# Patient Record
Sex: MALE
[De-identification: names, ages, dates, MRNs, and addresses within clinical notes are randomized per-mention and may not be internally consistent; named-entity substitution may affect disease eponyms.]

---

## 2018-04-28 ENCOUNTER — HOSPITAL ENCOUNTER (INPATIENT)
Dept: HOSPITAL 42 - ED | Age: 83
LOS: 12 days | Discharge: NURSING, REHAB OR LONG-TERM CARE | DRG: 83 | End: 2018-05-10
Attending: INTERNAL MEDICINE | Admitting: INTERNAL MEDICINE
Payer: MEDICARE

## 2018-04-28 VITALS — BODY MASS INDEX: 26.4 KG/M2

## 2018-04-28 DIAGNOSIS — E78.00: ICD-10-CM

## 2018-04-28 DIAGNOSIS — E86.0: ICD-10-CM

## 2018-04-28 DIAGNOSIS — Z78.1: ICD-10-CM

## 2018-04-28 DIAGNOSIS — K92.2: ICD-10-CM

## 2018-04-28 DIAGNOSIS — Z91.14: ICD-10-CM

## 2018-04-28 DIAGNOSIS — E03.9: ICD-10-CM

## 2018-04-28 DIAGNOSIS — Z79.82: ICD-10-CM

## 2018-04-28 DIAGNOSIS — I49.3: ICD-10-CM

## 2018-04-28 DIAGNOSIS — Z95.5: ICD-10-CM

## 2018-04-28 DIAGNOSIS — R40.2411: ICD-10-CM

## 2018-04-28 DIAGNOSIS — R13.12: ICD-10-CM

## 2018-04-28 DIAGNOSIS — Z87.891: ICD-10-CM

## 2018-04-28 DIAGNOSIS — B37.49: ICD-10-CM

## 2018-04-28 DIAGNOSIS — I10: ICD-10-CM

## 2018-04-28 DIAGNOSIS — Z79.899: ICD-10-CM

## 2018-04-28 DIAGNOSIS — F12.11: ICD-10-CM

## 2018-04-28 DIAGNOSIS — E87.2: ICD-10-CM

## 2018-04-28 DIAGNOSIS — E78.5: ICD-10-CM

## 2018-04-28 DIAGNOSIS — D69.6: ICD-10-CM

## 2018-04-28 DIAGNOSIS — E87.1: ICD-10-CM

## 2018-04-28 DIAGNOSIS — Z51.5: ICD-10-CM

## 2018-04-28 DIAGNOSIS — F05: ICD-10-CM

## 2018-04-28 DIAGNOSIS — E87.0: ICD-10-CM

## 2018-04-28 DIAGNOSIS — H26.9: ICD-10-CM

## 2018-04-28 DIAGNOSIS — I25.10: ICD-10-CM

## 2018-04-28 DIAGNOSIS — E87.5: ICD-10-CM

## 2018-04-28 DIAGNOSIS — H91.90: ICD-10-CM

## 2018-04-28 DIAGNOSIS — J44.1: ICD-10-CM

## 2018-04-28 DIAGNOSIS — F03.90: ICD-10-CM

## 2018-04-28 DIAGNOSIS — D18.1: ICD-10-CM

## 2018-04-28 DIAGNOSIS — Z91.81: ICD-10-CM

## 2018-04-28 DIAGNOSIS — N17.9: ICD-10-CM

## 2018-04-28 DIAGNOSIS — R65.10: ICD-10-CM

## 2018-04-28 DIAGNOSIS — H54.61: ICD-10-CM

## 2018-04-28 DIAGNOSIS — S06.5X9A: Primary | ICD-10-CM

## 2018-04-28 DIAGNOSIS — D64.9: ICD-10-CM

## 2018-04-28 LAB
ALBUMIN SERPL-MCNC: 4.6 G/DL (ref 3–4.8)
ALBUMIN SERPL-MCNC: 4.7 G/DL (ref 3–4.8)
ALBUMIN/GLOB SERPL: 1.5 {RATIO} (ref 1.1–1.8)
ALBUMIN/GLOB SERPL: 1.6 {RATIO} (ref 1.1–1.8)
ALT SERPL-CCNC: 32 U/L (ref 7–56)
ALT SERPL-CCNC: 36 U/L (ref 7–56)
APTT BLD: 32.6 SECONDS (ref 25.1–36.5)
ARTERIAL BLOOD GAS O2 SAT: 99.5 % (ref 95–98)
ARTERIAL BLOOD GAS PCO2: 27 MM/HG (ref 35–45)
ARTERIAL BLOOD GAS TCO2: 20.5 MMOL.L (ref 22–28)
AST SERPL-CCNC: 26 U/L (ref 17–59)
AST SERPL-CCNC: 29 U/L (ref 17–59)
BASE EXCESS BLDV CALC-SCNC: -0.3 MMOL/L (ref 0–2)
BASE EXCESS BLDV CALC-SCNC: 1.9 MMOL/L (ref 0–2)
BASOPHILS # BLD AUTO: 0 K/MM3 (ref 0–2)
BASOPHILS # BLD AUTO: 0.02 K/MM3 (ref 0–2)
BASOPHILS NFR BLD: 0 % (ref 0–3)
BASOPHILS NFR BLD: 0.2 % (ref 0–3)
BNP SERPL-MCNC: 331 PG/ML (ref 0–450)
BUN SERPL-MCNC: 12 MG/DL (ref 7–21)
BUN SERPL-MCNC: 13 MG/DL (ref 7–21)
CALCIUM SERPL-MCNC: 9.7 MG/DL (ref 8.4–10.5)
CALCIUM SERPL-MCNC: 9.8 MG/DL (ref 8.4–10.5)
EOSINOPHIL # BLD: 0 10*3/UL (ref 0–0.7)
EOSINOPHIL # BLD: 0.4 10*3/UL (ref 0–0.7)
EOSINOPHIL NFR BLD: 0 % (ref 1.5–5)
EOSINOPHIL NFR BLD: 2.8 % (ref 1.5–5)
ERYTHROCYTE [DISTWIDTH] IN BLOOD BY AUTOMATED COUNT: 13.6 % (ref 11.5–14.5)
ERYTHROCYTE [DISTWIDTH] IN BLOOD BY AUTOMATED COUNT: 13.9 % (ref 11.5–14.5)
GFR NON-AFRICAN AMERICAN: > 60
GFR NON-AFRICAN AMERICAN: > 60
GRANULOCYTES # BLD: 10.34 10*3/UL (ref 1.4–6.5)
GRANULOCYTES # BLD: 11.37 10*3/UL (ref 1.4–6.5)
GRANULOCYTES NFR BLD: 78.9 % (ref 50–68)
GRANULOCYTES NFR BLD: 95.2 % (ref 50–68)
HCO3 BLDA-SCNC: 19.7 MMOL/L (ref 21–28)
HGB BLD-MCNC: 15 G/DL (ref 14–18)
HGB BLD-MCNC: 15.3 G/DL (ref 14–18)
INHALED O2 CONCENTRATION: 40 %
INR PPP: 1.15 (ref 0.93–1.08)
LYMPHOCYTE: 5 % (ref 22–35)
LYMPHOCYTES # BLD: 0.4 10*3/UL (ref 1.2–3.4)
LYMPHOCYTES # BLD: 1.3 10*3/UL (ref 1.2–3.4)
LYMPHOCYTES NFR BLD AUTO: 10.2 % (ref 22–35)
LYMPHOCYTES NFR BLD AUTO: 3.7 % (ref 22–35)
MCH RBC QN AUTO: 31.7 PG (ref 25–35)
MCH RBC QN AUTO: 31.8 PG (ref 25–35)
MCHC RBC AUTO-ENTMCNC: 34.9 G/DL (ref 31–37)
MCHC RBC AUTO-ENTMCNC: 34.9 G/DL (ref 31–37)
MCV RBC AUTO: 91.1 FL (ref 80–105)
MCV RBC AUTO: 91.1 FL (ref 80–105)
MONOCYTE: 1 % (ref 1–6)
MONOCYTES # BLD AUTO: 0.1 10*3/UL (ref 0.1–0.6)
MONOCYTES # BLD AUTO: 1 10*3/UL (ref 0.1–0.6)
MONOCYTES NFR BLD: 1.1 % (ref 1–6)
MONOCYTES NFR BLD: 7.9 % (ref 1–6)
NEUTROPHILS NFR BLD AUTO: 94 % (ref 50–70)
PH BLDA: 7.47 [PH] (ref 7.35–7.45)
PH BLDV: 7.33 [PH] (ref 7.32–7.43)
PH BLDV: 7.46 [PH] (ref 7.32–7.43)
PLATELET # BLD EST: NORMAL 10*3/UL
PLATELET # BLD: 219 10^3/UL (ref 120–450)
PLATELET # BLD: 240 10^3/UL (ref 120–450)
PMV BLD AUTO: 10 FL (ref 7–11)
PMV BLD AUTO: 9.9 FL (ref 7–11)
PO2 BLDA: 111 MM/HG (ref 80–100)
PROTHROMBIN TIME: 13.3 SECONDS (ref 9.4–12.5)
RBC # BLD AUTO: 4.72 10^6/UL (ref 3.5–6.1)
RBC # BLD AUTO: 4.82 10^6/UL (ref 3.5–6.1)
TROPONIN I SERPL-MCNC: < 0.01 NG/ML
VENOUS BLOOD FIO2: 21 %
VENOUS BLOOD FIO2: 21 %
VENOUS BLOOD GAS PCO2: 32 (ref 40–60)
VENOUS BLOOD GAS PCO2: 55 (ref 40–60)
VENOUS BLOOD GAS PO2: 141 MM/HG (ref 30–55)
VENOUS BLOOD GAS PO2: 35 MM/HG (ref 30–55)
WBC # BLD AUTO: 11.9 10^3/UL (ref 4.5–11)
WBC # BLD AUTO: 13.1 10^3/UL (ref 4.5–11)

## 2018-04-28 RX ADMIN — IPRATROPIUM BROMIDE AND ALBUTEROL SULFATE SCH ML: .5; 3 SOLUTION RESPIRATORY (INHALATION) at 11:50

## 2018-04-28 RX ADMIN — CEFTRIAXONE SCH MLS/HR: 1 INJECTION, SOLUTION INTRAVENOUS at 15:38

## 2018-04-28 RX ADMIN — AZITHROMYCIN DIHYDRATE SCH MLS/HR: 500 INJECTION, POWDER, LYOPHILIZED, FOR SOLUTION INTRAVENOUS at 17:00

## 2018-04-28 RX ADMIN — IPRATROPIUM BROMIDE AND ALBUTEROL SULFATE SCH ML: .5; 3 SOLUTION RESPIRATORY (INHALATION) at 12:20

## 2018-04-28 RX ADMIN — METHYLPREDNISOLONE SODIUM SUCCINATE SCH: 40 INJECTION, POWDER, FOR SOLUTION INTRAMUSCULAR; INTRAVENOUS at 15:32

## 2018-04-28 RX ADMIN — IPRATROPIUM BROMIDE AND ALBUTEROL SULFATE SCH ML: .5; 3 SOLUTION RESPIRATORY (INHALATION) at 15:37

## 2018-04-28 RX ADMIN — IPRATROPIUM BROMIDE AND ALBUTEROL SULFATE SCH ML: .5; 3 SOLUTION RESPIRATORY (INHALATION) at 21:22

## 2018-04-28 RX ADMIN — BUDESONIDE SCH MG: 0.25 SUSPENSION RESPIRATORY (INHALATION) at 21:22

## 2018-04-28 RX ADMIN — IPRATROPIUM BROMIDE AND ALBUTEROL SULFATE SCH ML: .5; 3 SOLUTION RESPIRATORY (INHALATION) at 12:05

## 2018-04-28 RX ADMIN — METHYLPREDNISOLONE SODIUM SUCCINATE SCH MG: 40 INJECTION, POWDER, FOR SOLUTION INTRAMUSCULAR; INTRAVENOUS at 22:56

## 2018-04-28 NOTE — CP.PCM.PN
Subjective





- Date & Time of Evaluation


Date of Evaluation: 04/28/18


Time of Evaluation: 22:15





- Subjective


Subjective: 





Patient was seen at bedside .


Reason: I was asked to Co-sign restraint order.


Medical record was reviewed.


Patient is agitated, restless , in the bed.


Tells me to get out of room.


Does not answer any other questions.


This 82 year old white male was admitted with shortness, wheezing,  headache, 

leukocytosis,bilateral subdural hematoma.


Has PMH of COPD, hypothyroidism, alcohol abuse , former smoker.





Objective





- Vital Signs/Intake and Output


Vital Signs (last 24 hours): 


 











Temp Pulse Resp BP Pulse Ox


 


 98.3 F   100 H  14   154/103 H  99 


 


 04/28/18 20:00  04/28/18 20:50  04/28/18 20:50  04/28/18 20:00  04/28/18 20:50








Intake and Output: 


 











 04/28/18 04/29/18





 18:59 06:59


 


Intake Total 350 


 


Output Total 160 


 


Balance 190 














- Medications


Medications: 


 Current Medications





Albuterol/Ipratropium (Duoneb 3 Mg/0.5 Mg (3 Ml) Ud)  3 ml IH Q4H [facility]


   Stop: 04/28/18 23:31


   Last Admin: 04/28/18 21:22 Dose:  3 ml


Aspirin (Ecotrin)  81 mg PO DAILY [facility]


Atorvastatin Calcium (Lipitor)  20 mg PO DAILY [facility]


Budesonide (Pulmicort Respules)  0.25 mg IH [id number] [facility]


   Last Admin: 04/28/18 21:22 Dose:  0.25 mg


Ceftriaxone Sodium (Rocephin 1 Gram Ivpb)  1 gm in 100 mls @ 100 mls/hr IVPB 

DAILY [facility]


   PRN Reason: Protocol


   Last Admin: 04/28/18 15:38 Dose:  100 mls/hr


Azithromycin (Zithromax 500mg In Ns)  500 mg in 250 mls @ 167 mls/hr IVPB DAILY 

[facility]


   PRN Reason: Protocol


   Last Admin: 04/28/18 17:00 Dose:  167 mls/hr


Lisinopril (Zestril)  20 mg PO DAILY [facility]


Lorazepam (Ativan)  2 mg IVP ONCE ONE


   PRN Reason: Protocol


   Stop: 04/28/18 22:14


Methylprednisolone (Solu-Medrol)  40 mg IVP Q8H [facility]


   Last Admin: 04/28/18 15:32 Dose:  Not Given


Metoprolol Tartrate (Lopressor)  25 mg PO BID [facility]


   Last Admin: 04/28/18 17:00 Dose:  25 mg


Pantoprazole Sodium (Protonix Inj)  40 mg IVP DAILY [facility]


   Last Admin: 04/28/18 17:00 Dose:  40 mg











- Labs


Labs: 


 











PT  13.3 SECONDS (9.4-12.5)  H  04/28/18  11:39    


 


INR  1.15  (0.93-1.08)  H  04/28/18  11:39    


 


APTT  32.6 Seconds (25.1-36.5)   04/28/18  11:39    








 Most Recent Lab Values











WBC  13.1 10^3/ul (4.5-11.0)  H  04/28/18  11:39    


 


RBC  4.82 10^6/uL (3.5-6.1)   04/28/18  11:39    


 


Hgb  15.3 g/dL (14.0-18.0)   04/28/18  11:39    


 


Hct  43.9 % (42.0-52.0)   04/28/18  11:39    


 


MCV  91.1 fl (80.0-105.0)   04/28/18  11:39    


 


MCH  31.7 pg (25.0-35.0)   04/28/18  11:39    


 


MCHC  34.9 g/dl (31.0-37.0)   04/28/18  11:39    


 


RDW  13.6 % (11.5-14.5)   04/28/18  11:39    


 


Plt Count  219 10^3/uL (120.0-450.0)   04/28/18  11:39    


 


MPV  10.0 fl (7.0-11.0)   04/28/18  11:39    


 


Gran %  78.9 % (50.0-68.0)  H  04/28/18  11:39    


 


Lymph % (Auto)  10.2 % (22.0-35.0)  L  04/28/18  11:39    


 


Mono % (Auto)  7.9 % (1.0-6.0)  H  04/28/18  11:39    


 


Eos % (Auto)  2.8 % (1.5-5.0)   04/28/18  11:39    


 


Baso % (Auto)  0.2 % (0.0-3.0)   04/28/18  11:39    


 


Gran #  10.34  (1.4-6.5)  H  04/28/18  11:39    


 


Lymph # (Auto)  1.3  (1.2-3.4)   04/28/18  11:39    


 


Mono # (Auto)  1.0  (0.1-0.6)  H  04/28/18  11:39    


 


Eos # (Auto)  0.4  (0.0-0.7)   04/28/18  11:39    


 


Baso # (Auto)  0.02 K/mm3 (0.0-2.0)   04/28/18  11:39    


 


PT  13.3 SECONDS (9.4-12.5)  H  04/28/18  11:39    


 


INR  1.15  (0.93-1.08)  H  04/28/18  11:39    


 


APTT  32.6 Seconds (25.1-36.5)   04/28/18  11:39    


 


pCO2  27 mm/Hg (35-45)  L  04/28/18  15:40    


 


pO2  111.0 mm/Hg ()  H  04/28/18  15:40    


 


HCO3  19.7 mmol/L (21-28)  L  04/28/18  15:40    


 


ABG pH  7.47  (7.35-7.45)  H  04/28/18  15:40    


 


ABG Total CO2  20.5 mmol.L (22-28)  L  04/28/18  15:40    


 


ABG O2 Saturation  99.5 % (95-98)  H  04/28/18  15:40    


 


ABG Base Excess  -2.6 mmol/L (-2.0-3.0)  L  04/28/18  15:40    


 


ABG Potassium  3.0 mmol/L (3.6-5.2)  L  04/28/18  15:40    


 


VBG pH  7.46  (7.32-7.43)  H  04/28/18  15:35    


 


VBG pCO2  32.0  (40-60)  L  04/28/18  15:35    


 


VBG HCO3  22.8 mmol/l (21-28)   04/28/18  15:35    


 


VBG Total CO2  23.8 mmol.L (22-28)   04/28/18  15:35    


 


VBG O2 Sat (Calc)  100.3 % (40-65)  H  04/28/18  15:35    


 


VBG Base Excess  -0.3 mmol/L (0.0-2.0)  L  04/28/18  15:35    


 


VBG Potassium  3.8 mmol/L (3.6-5.2)   04/28/18  15:35    


 


Sodium  141.0 mmol/L (132-148)   04/28/18  15:40    


 


Chloride  112.0 mmol/L ()  H  04/28/18  15:40    


 


Glucose  169 mg/dl ()  H  04/28/18  15:40    


 


Lactate  1.5 mmol/L (0.7-2.1)   04/28/18  15:40    


 


FiO2  40.0 %  04/28/18  15:40    


 


Sodium  144 mmol/L (132-148)   04/28/18  11:39    


 


Potassium  4.2 mmol/L (3.6-5.0)   04/28/18  11:39    


 


Chloride  102 mmol/L ()   04/28/18  11:39    


 


Carbon Dioxide  29 mmol/L (21-33)   04/28/18  11:39    


 


Anion Gap  17  (10-20)   04/28/18  11:39    


 


BUN  12 mg/dL (7-21)   04/28/18  11:39    


 


Creatinine  0.9 mg/dl (0.8-1.5)   04/28/18  11:39    


 


Est GFR ( Amer)  > 60   04/28/18  11:39    


 


Est GFR (Non-Af Amer)  > 60   04/28/18  11:39    


 


Random Glucose  129 mg/dL ()  H  04/28/18  11:39    


 


Calcium  9.7 mg/dL (8.4-10.5)   04/28/18  11:39    


 


Total Bilirubin  0.6 mg/dL (0.2-1.3)   04/28/18  11:39    


 


AST  29 U/L (17-59)   04/28/18  11:39    


 


ALT  36 U/L (7-56)   04/28/18  11:39    


 


Alkaline Phosphatase  89 U/L ()   04/28/18  11:39    


 


Lactate Dehydrogenase  432 U/L (333-699)   04/28/18  11:39    


 


Total Creatine Kinase  37 U/L ()   04/28/18  11:39    


 


Troponin I  < 0.01 ng/mL  04/28/18  11:39    


 


NT-Pro-B Natriuret Pep  331 pg/mL (0-450)   04/28/18  11:39    


 


Total Protein  7.7 g/dL (5.8-8.3)   04/28/18  11:39    


 


Albumin  4.7 g/dL (3.0-4.8)   04/28/18  11:39    


 


Globulin  3.0 gm/dL  04/28/18  11:39    


 


Albumin/Globulin Ratio  1.5  (1.1-1.8)   04/28/18  11:39    


 


Arterial Blood Potassium  3.0 mmol/L (3.6-5.2)  L  04/28/18  15:40    


 


Venous Blood Potassium  3.8 mmol/L (3.6-5.2)   04/28/18  15:35    














- Constitutional


Appears: Well, No Acute Distress, Other (Restless.)





- Head Exam


Head Exam: ATRAUMATIC, NORMAL INSPECTION, NORMOCEPHALIC





- Eye Exam


Additional comments: 





Right eye enucleated.





- ENT Exam


ENT Exam: Normal External Ear Exam


Additional comments: 





Deafness.





- Neck Exam


Neck Exam: Normal Inspection





- Respiratory Exam


Respiratory Exam: NORMAL BREATHING PATTERN





- Cardiovascular Exam


Cardiovascular Exam: absent: JVD





- GI/Abdominal Exam


GI & Abdominal Exam: absent: Distended





- Rectal Exam


Rectal Exam: Deferred





-  Exam


Additional comments: 





Deferred.





- Extremities Exam


Extremities Exam: Normal Inspection





- Back Exam


Back Exam: NORMAL INSPECTION





- Neurological Exam


Neurological Exam: Altered





- Psychiatric Exam


Psychiatric exam: Agitated





- Skin


Skin Exam: Normal Color





Assessment and Plan





- Assessment and Plan (Free Text)


Assessment: 





Agitation.


Restlessness.


Subdural hematoma.


R/O hypercarbia.


COPD.


HTN.


Hypothyroidism.


Plan: 





CT head without contrast stat.


Ativan 2 mg IV  when in CT dept prn.


ABG stat.


FSBS stat.


CBC with Diff, CMP, Magnesium , Phos. levels.


Serum alcohol level.


Restraint order signed.


Continue present management.


CCT spent 30 minutes.


CT head: No extension of hematoma.

## 2018-04-28 NOTE — RAD
HISTORY:

shortness of breathe  



COMPARISON:

07/16/2015 



FINDINGS:



LUNGS:

No active pulmonary disease.



PLEURA:

No significant pleural effusion identified, no pneumothorax apparent. 

Mild interstitial change is noted.



CARDIOVASCULAR:

There is uncoiling of the aorta with atherosclerotic change. Heart is 

grossly unchanged from prior study.  No CHF is seen.



OSSEOUS STRUCTURES:

No significant abnormalities.



VISUALIZED UPPER ABDOMEN:

Normal.



OTHER FINDINGS:

None.



IMPRESSION:

No active disease.

## 2018-04-28 NOTE — CT
PROCEDURE:  CT HEAD WITHOUT CONTRAST.



HISTORY:

headache r/o ich



COMPARISON:

CT scan 02/27/2014, MRI 2015 



TECHNIQUE:

Axial computed tomography images were obtained through the head/brain 

without intravenous contrast.  



Radiation dose:



Total exam DLP = 970 mGy-cm.



This CT exam was performed using one or more of the following dose 

reduction techniques: Automated exposure control, adjustment of the 

mA and/or kV according to patient size, and/or use of iterative 

reconstruction technique.



FINDINGS:



HEMORRHAGE:

There appears to be the suggestion of some mild linear areas of high 

density in the subdural regions overlying the right frontoparietal 

lobe and additionally right posterior subdural parietal lobe region. 

Additional areas of my old bilateral lower density subdural 

collections are identified bilaterally, slightly greater on the left 

with very mild localized mass effect. These are new from the prior CT 

scan dated 02/27/2014 and MRI exam dated 01/26/2015. On the left this 

measures 5 is 7 millimeters. On the right the collection measures 4-5 

millimeters posteriorly and superiorly along the convex to the of the 

posterior right parietal lobe. 



BRAIN:

Cerebral cortical atrophy is noted.  Ventricles are normal in size. 

Moderate areas of decreased density are seen in the white matter 

tracts suggestive of microvascular small-vessel change.  No 

appreciable acute parenchymal hemorrhage is noted.  No 

intraventricular hemorrhage is seen.  Posterior fossa is stable.  No 

atrophy or chronic microvascular ischemic changes.



VENTRICLES:

Unremarkable. No hydrocephalus. 



CALVARIUM:

Unremarkable.



PARANASAL SINUSES:

There is evidence of prior inferior wall chronic fracture.  Right 

globe is also atrophied as was seen on prior study.  No acute 

fractures are seen in the sinuses.



MASTOID AIR CELLS:

Unremarkable as visualized. No inflammatory changes.



OTHER FINDINGS:

None.



IMPRESSION:

Small bilateral subdural collections are noted.  There appears to be 

some mild intermediate to higher density in the right subdural 

collection which is smaller in size.  Mild localized mass effect is 

noted. Please see above for measurements of the subdural collections. 

 Findings suggest subacute to chronic subdural hematomas. No 

intraparenchymal hemorrhage seen. No recent infarct clearly 

identified.  Stable atrophy and small vessel change.

## 2018-04-28 NOTE — ED PDOC
Arrival/HPI





- General


Chief Complaint: Headache


Time Seen by Provider: 04/28/18 11:01


Historian: Spouse





- Critical Care


Critical Care Minutes: 30 minutes





- History of Present Illness


Narrative History of Present Illness (Text): 


04/28/18 11:28





An 82 year old male, whose past medical history includes deafness, HTN, chronic 

obstructive pulmonary disease, and hypothyroidism, presents to the emergency 

room with a complaint of shortness of breath, wheezing, and headache this 

morning. The patient's wife states that this morning the patient took his 

nebulizer with no relief of his symptoms. She also states that the patient 

complained of a headache. She states she gave the patient Tylenol, but his 

symptoms were not relieved which prompted them to come into the emergency room. 

She states that he seemed confused this morning. The patient denies fevers, 

chills, dizziness, sore throat, chest pain, abdominal pain, nausea, vomiting, 

diarrhea, neck/back pain, urinary/bowel changes or any other complaint. 








PMD: Dr. Mcintosh








Time/Duration: Other (This morning)


Symptom Onset: Sudden


Symptom Course: Unchanged


Activities at Onset: Rest, Light


Context: Home





Past Medical History





- Provider Review


Nursing Documentation Reviewed: Yes





- Infectious Disease


Hx of Infectious Diseases: None





- Tetanus Immunization


Tetanus Immunization: Up to Date





- Cardiac


Hx Hypertension: Yes


Hx Pacemaker: No





- Pulmonary


Hx Chronic Obstructive Pulmonary Disease (COPD): Yes





- Neurological


Hx Paralysis: No





- Endocrine/Metabolic


Hx Hypothyroidism: Yes





- Hematological/Oncological


Hx Blood Transfusions: No


Hx Blood Transfusion Reaction: No





- Musculoskeletal/Rheumatological


Hx Musculoskeletal Disorders: No





- Psychiatric


Hx Depression: No


Hx Emotional Abuse: No


Hx Physical Abuse: No


Hx Substance Use: No





- Past Surgical History


Past Surgical History: Non-Contributing





- Anesthesia


Hx Anesthesia: Yes


Hx Anesthesia Reactions: No


Hx Malignant Hyperthermia: No





- Suicidal Assessment


Feels Threatened In Home Enviroment: No





Family/Social History





- Physician Review


Nursing Documentation Reviewed: Yes


Family/Social History: No Known Family HX


Smoking Status: Unknown If Ever Smoked


Hx Alcohol Use: Yes


Hx Substance Use: No


Hx Substance Use Treatment: No





Allergies/Home Meds


Allergies/Adverse Reactions: 


Allergies





No Known Allergies Allergy (Verified 04/28/18 15:33)


 








Home Medications: 


 Home Meds











 Medication  Instructions  Recorded  Confirmed


 


Budesonide 0.5 mg IH DAILY 08/05/13 07/14/14


 


Indomethacin 50 mg PO PRN PRN 08/05/13 07/14/14


 


Levothyroxine Sodium 0.125 mg PO DAILY 08/05/13 07/14/14





[Levothyroxine]   


 


Lisinopril 20 mg PO DAILY 08/05/13 07/14/14


 


Metoprolol Tartrate 25 mg PO BID 08/05/13 07/14/14


 


Simvastatin 80 mg PO DAILY 08/05/13 07/14/14


 


Terazosin HCl [Terazosin HCl] 2 mg PO DAILY 08/05/13 07/14/14


 


Tiotropium Bromide [Spiriva] 18 mcg IH DAILY 08/05/13 07/14/14


 


Acetaminophen/Oxycodone Hydr 1 tab PO Q6 PRN 08/12/13 07/14/14





[Percocet 325 mg-5 mg]   


 


Aspirin 81 mg PO DAILY 07/14/14 07/14/14














Review of Systems





- Physician Review


All systems were reviewed & negative as marked: Yes





- Review of Systems


Constitutional: absent: Fevers, Night Sweats


ENT: absent: Sore Throat


Respiratory: SOB, Wheezing


Cardiovascular: absent: Chest Pain


Gastrointestinal: absent: Abdominal Pain, Stool Changes, Diarrhea, Nausea, 

Vomiting


Genitourinary Male: absent: Urinary Output Changes


Musculoskeletal: absent: Back Pain, Neck Pain


Neurological: Headache.  absent: Dizziness





Physical Exam


Vital Signs Reviewed: Yes


Vital Signs











  Temp Pulse Resp BP Pulse Ox


 


 04/28/18 14:42   119 H  19  142/84  95


 


 04/28/18 13:27   112 H  18  158/96 H  100


 


 04/28/18 11:06  98.4 F  100 H  18  162/116 H  97











Temperature: Afebrile


Blood Pressure: Hypertensive


Pulse: Tachycardic


Respiratory Rate: Normal


Appearance: Positive for: Well-Appearing, Non-Toxic, Comfortable


Pain Distress: None


Mental Status: Positive for: Alert and Oriented X 3





- Systems Exam


Head: Present: Atraumatic, Normocephalic


Pupils: Present: PERRL, Other (Atrophy of the right eye. )


Extroacular Muscles: Present: EOMI


Conjunctiva: Present: Normal


Mouth: Present: Moist Mucous Membranes


Neck: Present: Normal Range of Motion


Respiratory/Chest: Present: Wheezes (Bilateral wheezing).  No: Good Air 

Exchange (Decreased air entry. ), Rales, Rhonchi, Tachypneic


Cardiovascular: Present: Regular Rate and Rhythm, Normal S1, S2.  No: Murmurs


Abdomen: No: Tenderness, Distention, Peritoneal Signs


Back: Present: Normal Inspection


Upper Extremity: Present: Normal Inspection.  No: Cyanosis, Edema


Lower Extremity: Present: Normal Inspection.  No: Edema


Neurological: Present: GCS=15, CN II-XII Intact, Speech Normal


Skin: Present: Warm, Dry, Normal Color.  No: Rashes


Psychiatric: Present: Alert, Oriented x 3, Normal Insight, Normal Concentration





Medical Decision Making


ED Course and Treatment: 


04/28/18 11:44





Impression:


An 82 year old male presents to the emergency room with complaint of shortness 

of breath, wheezing, and headache since this morning. 





Differential Diagnosis included but are not limited to: chronic obstructive 

pulmonary disease exacerbation, tension vs. migraines less likely CVA. 





Plan:


-- Head CT


-- EKG


-- Chest X-ray 


-- Labs


-- Blood Culture


-- Duoneb and SOLU-Medrol


-- Reassess and disposition





Progress Notes:





EKG:


Ordered, reviewed, and independently interpreted the EKG.


Rate : 100 BPM


Rhythm : NSR


Interpretation : Non-specific ST changes


Comparison : No change from 06/17/14





CHEST X-RAY


Dictator : Alycia Lin MD


Report Date : 04/28/2018 11:44:35


IMPRESSION: No active disease.








PROCEDURE:  CT HEAD WITHOUT CONTRAST.


Dictator : Alycia Lin MD


Report Date : 04/28/2018 13:48:56


IMPRESSION: Small bilateral subdural collections are noted.  There appears to 

be some mild intermediate to higher density in the right subdural collection 

which is smaller in size.  Mild localized mass effect is noted. Please see 

above for measurements of the subdural collections.  Findings suggest subacute 

to chronic subdural hematomas. No intraparenchymal hemorrhage seen. No recent 

infarct clearly identified.  Stable atrophy and small vessel change.





04/28/18 14:47: Case discussed with Dr. Yepez who recommends a Head CT 

without contrast and to hold the Aspirin. Wife states that he may have fallen 2 

weeks ago and hit head.





04/28/18 14:49: Case discussed with Dr. Bernal who accepts patient to his 

service. 








- Critical Care


Critical Care Minutes: 30 minutes





- Lab Interpretations


Lab Results: 








 04/28/18 11:39 





 04/28/18 11:39 





 Lab Results





04/28/18 11:39: Sodium 144, Chloride 102, Potassium 4.2, Carbon Dioxide 29, 

Anion Gap 17, BUN 12, Creatinine 0.9, Est GFR (African Amer) > 60, Est GFR (Non-

Af Amer) > 60, Random Glucose 129 H, Calcium 9.7, Total Bilirubin 0.6, AST 29, 

ALT 36, Alkaline Phosphatase 89, Lactate Dehydrogenase 432, Total Creatine 

Kinase 37, Troponin I < 0.01, NT-Pro-B Natriuret Pep 331, Total Protein 7.7, 

Albumin 4.7, Globulin 3.0, Albumin/Globulin Ratio 1.5


04/28/18 11:39: pO2 35, VBG pH 7.33, VBG pCO2 55.0, VBG HCO3 29.0 H, VBG Total 

CO2 30.7 H, VBG O2 Sat (Calc) 69.8 H, VBG Base Excess 1.9, VBG Potassium 4.2, 

Sodium 141.0, Chloride 104.0, Glucose 134 H, Lactate 2.1, FiO2 21.0, Venous 

Blood Potassium 4.2


04/28/18 11:39: PT 13.3 H, INR 1.15 H, APTT 32.6


04/28/18 11:39: WBC 13.1 H, RBC 4.82, Hgb 15.3, Hct 43.9, MCV 91.1, MCH 31.7, 

MCHC 34.9, RDW 13.6, Plt Count 219, MPV 10.0, Gran % 78.9 H, Lymph % (Auto) 

10.2 L, Mono % (Auto) 7.9 H, Eos % (Auto) 2.8, Baso % (Auto) 0.2, Gran # 10.34 H

, Lymph # (Auto) 1.3, Mono # (Auto) 1.0 H, Eos # (Auto) 0.4, Baso # (Auto) 0.02








I have reviewed the lab results: Yes





- RAD Interpretation


Radiology Orders: 








04/28/18 11:28


HEAD W/O CONTRAST [CT] Stat 


CHEST PORTABLE [RAD] Stat 














- EKG Interpretation


Interpreted by ED Physician: Yes


Type: 12 lead EKG





- Medication Orders


Current Medication Orders: 








Albuterol/Ipratropium (Duoneb 3 Mg/0.5 Mg (3 Ml) Ud)  3 ml IH Q4H [facility]


   Stop: 04/28/18 23:31


   Last Admin: 04/28/18 15:37  Dose: 3 ml





Aspirin (Ecotrin)  81 mg PO DAILY [facility]


Atorvastatin Calcium (Lipitor)  20 mg PO DAILY [facility]


Budesonide (Pulmicort Respules)  0.25 mg IH [id number] [facility]


Ceftriaxone Sodium (Rocephin 1 Gram Ivpb)  1 gm in 100 mls @ 100 mls/hr IVPB 

DAILY [facility]


   PRN Reason: Protocol


   Last Admin: 04/28/18 15:38  Dose: 100 mls/hr





eMAR Start Stop


 Document     04/28/18 15:38  RD  (Rec: 04/28/18 15:38  RD  [id number])


     Intravenous Solution


      Start Date                                 04/28/18


      Start Time                                 15:38


      End Date                                   04/28/18


      End time                                   16:38


      Total Infusion Time                        60





Azithromycin (Zithromax 500mg In Ns)  500 mg in 250 mls @ 167 mls/hr IVPB DAILY 

[facility]


   PRN Reason: Protocol


Lisinopril (Zestril)  20 mg PO DAILY [facility]


Methylprednisolone (Solu-Medrol)  40 mg IVP Q8H [facility]


   Last Admin: 04/28/18 15:32  Dose:  





Metoprolol Tartrate (Lopressor)  25 mg PO BID [facility]


Pantoprazole Sodium (Protonix Inj)  40 mg IVP DAILY [facility]





Discontinued Medications





Albuterol/Ipratropium (Duoneb 3 Mg/0.5 Mg (3 Ml) Ud)  3 ml IH Q15M [facility]


   Stop: 04/28/18 12:01


   Last Admin: 04/28/18 12:20  Dose: 3 ml





Methylprednisolone (Solu-Medrol)  125 mg IVP STAT [facility]


   Stop: 04/28/18 11:29


   Last Admin: 04/28/18 12:16  Dose: 125 mg





IVP Administration


 Document     04/28/18 12:16  RD  (Rec: 04/28/18 12:16  RD  [id number])


     Charges for Administration


      # of IVP Administrations                   1





Methylprednisolone (Solu-Medrol)  40 mg IVP Q6H [facility]











- Scribe Statement


The provider has reviewed the documentation as recorded by the Scribe


Lillian Archibald





Provider Scribe Attestation:


All medical record entries made by the Scribe were at my direction and 

personally dictated by me. I have reviewed the chart and agree that the record 

accurately reflects my personal performance of the history, physical exam, 

medical decision making, and the department course for this patient. I have 

also personally directed, reviewed, and agree with the discharge instructions 

and disposition.








Disposition/Present on Arrival





- Present on Arrival


Any Indicators Present on Arrival: No


History of DVT/PE: No


History of Uncontrolled Diabetes: No


Urinary Catheter: No


History of Decub. Ulcer: No


History Surgical Site Infection Following: None





- Disposition


Have Diagnosis and Disposition been Completed?: Yes


Diagnosis: 


 SDH (subdural hematoma), COPD (chronic obstructive pulmonary disease)





Disposition: HOSPITALIZED


Disposition Time: 14:50


Patient Plan: Admission


Condition: CRITICAL

## 2018-04-28 NOTE — CARD
--------------- APPROVED REPORT --------------





EKG Measurement

Heart [id number]

NV [id number]

[id number]

[id number]

[id number]



<Conclusion>

Normal sinus rhythm

Nonspecific ST and T wave abnormality

Prolonged QT

Abnormal ECG

## 2018-04-28 NOTE — CP.PCM.PN
Subjective





- Date & Time of Evaluation


Date of Evaluation: 04/28/18


Time of Evaluation: 17:46





- Subjective


Subjective: 





83 yo male adm with sob and HA


hx of fall 2 wks ago


CT small bilat sdh L>R


some acute blood in R


R about 3 mm


L about 5 mm





At this point to small to drain





Repeat CT in AM


observe for any MS changes





Objective





- Vital Signs/Intake and Output


Vital Signs (last 24 hours): 


 











Temp Pulse Resp BP Pulse Ox


 


 98.4 F   118 H  19   158/92 H  95 


 


 04/28/18 11:06  04/28/18 17:00  04/28/18 14:42  04/28/18 17:00  04/28/18 14:42








Intake and Output: 


 











 04/28/18 04/28/18





 06:59 18:59


 


Intake Total  350


 


Balance  350














- Medications


Medications: 


 Current Medications





Albuterol/Ipratropium (Duoneb 3 Mg/0.5 Mg (3 Ml) Ud)  3 ml IH Q4H LEONARDA


   Stop: 04/28/18 23:31


   Last Admin: 04/28/18 15:37 Dose:  3 ml


Aspirin (Ecotrin)  81 mg PO DAILY [facility]


Atorvastatin Calcium (Lipitor)  20 mg PO DAILY [facility]


Budesonide (Pulmicort Respules)  0.25 mg IH [id number] [facility]


Ceftriaxone Sodium (Rocephin 1 Gram Ivpb)  1 gm in 100 mls @ 100 mls/hr IVPB 

DAILY [facility]


   PRN Reason: Protocol


   Last Admin: 04/28/18 15:38 Dose:  100 mls/hr


Azithromycin (Zithromax 500mg In Ns)  500 mg in 250 mls @ 167 mls/hr IVPB DAILY 

[facility]


   PRN Reason: Protocol


   Last Admin: 04/28/18 17:00 Dose:  167 mls/hr


Lisinopril (Zestril)  20 mg PO DAILY [facility]


Methylprednisolone (Solu-Medrol)  40 mg IVP Q8H [facility]


   Last Admin: 04/28/18 15:32 Dose:  Not Given


Metoprolol Tartrate (Lopressor)  25 mg PO BID [facility]


   Last Admin: 04/28/18 17:00 Dose:  25 mg


Pantoprazole Sodium (Protonix Inj)  40 mg IVP DAILY [facility]


   Last Admin: 04/28/18 17:00 Dose:  40 mg











- Labs


Labs: 


 











PT  13.3 SECONDS (9.4-12.5)  H  04/28/18  11:39    


 


INR  1.15  (0.93-1.08)  H  04/28/18  11:39    


 


APTT  32.6 Seconds (25.1-36.5)   04/28/18  11:39

## 2018-04-29 LAB
ALBUMIN SERPL-MCNC: 4.6 G/DL (ref 3–4.8)
ALBUMIN/GLOB SERPL: 1.5 {RATIO} (ref 1.1–1.8)
ALT SERPL-CCNC: 26 U/L (ref 7–56)
ARTERIAL BLOOD GAS HEMOGLOBIN: 12.8 G/DL (ref 11.7–17.4)
ARTERIAL BLOOD GAS O2 SAT: 99 % (ref 95–98)
ARTERIAL BLOOD GAS PCO2: 31 MM/HG (ref 35–45)
ARTERIAL BLOOD GAS TCO2: 21.6 MMOL.L (ref 22–28)
AST SERPL-CCNC: 25 U/L (ref 17–59)
BASOPHILS # BLD AUTO: 0.01 K/MM3 (ref 0–2)
BASOPHILS NFR BLD: 0.1 % (ref 0–3)
BUN SERPL-MCNC: 15 MG/DL (ref 7–21)
CALCIUM SERPL-MCNC: 9.9 MG/DL (ref 8.4–10.5)
EOSINOPHIL # BLD: 0 10*3/UL (ref 0–0.7)
EOSINOPHIL NFR BLD: 0.1 % (ref 1.5–5)
ERYTHROCYTE [DISTWIDTH] IN BLOOD BY AUTOMATED COUNT: 14 % (ref 11.5–14.5)
GFR NON-AFRICAN AMERICAN: > 60
GRANULOCYTES # BLD: 17.57 10*3/UL (ref 1.4–6.5)
GRANULOCYTES NFR BLD: 91.7 % (ref 50–68)
HCO3 BLDA-SCNC: 20.6 MMOL/L (ref 21–28)
HGB BLD-MCNC: 15 G/DL (ref 14–18)
INHALED O2 CONCENTRATION: 32 %
LYMPHOCYTES # BLD: 0.9 10*3/UL (ref 1.2–3.4)
LYMPHOCYTES NFR BLD AUTO: 4.5 % (ref 22–35)
MCH RBC QN AUTO: 31.8 PG (ref 25–35)
MCHC RBC AUTO-ENTMCNC: 34.6 G/DL (ref 31–37)
MCV RBC AUTO: 91.9 FL (ref 80–105)
MONOCYTES # BLD AUTO: 0.7 10*3/UL (ref 0.1–0.6)
MONOCYTES NFR BLD: 3.6 % (ref 1–6)
O2 CAP BLDA-SCNC: 17.5 ML/DL (ref 16–24)
O2 CT BLDA-SCNC: 17.3 ML/DL (ref 15–23)
PH BLDA: 7.43 [PH] (ref 7.35–7.45)
PLATELET # BLD: 248 10^3/UL (ref 120–450)
PMV BLD AUTO: 10.1 FL (ref 7–11)
PO2 BLDA: 81 MM/HG (ref 80–100)
RBC # BLD AUTO: 4.72 10^6/UL (ref 3.5–6.1)
WBC # BLD AUTO: 19.2 10^3/UL (ref 4.5–11)

## 2018-04-29 RX ADMIN — METHYLPREDNISOLONE SODIUM SUCCINATE SCH MG: 40 INJECTION, POWDER, FOR SOLUTION INTRAMUSCULAR; INTRAVENOUS at 09:13

## 2018-04-29 RX ADMIN — BUDESONIDE SCH MG: 0.25 SUSPENSION RESPIRATORY (INHALATION) at 21:47

## 2018-04-29 RX ADMIN — BUDESONIDE SCH MG: 0.25 SUSPENSION RESPIRATORY (INHALATION) at 08:46

## 2018-04-29 RX ADMIN — AZITHROMYCIN DIHYDRATE SCH MLS/HR: 500 INJECTION, POWDER, LYOPHILIZED, FOR SOLUTION INTRAVENOUS at 09:14

## 2018-04-29 RX ADMIN — METHYLPREDNISOLONE SODIUM SUCCINATE SCH MG: 40 INJECTION, POWDER, FOR SOLUTION INTRAMUSCULAR; INTRAVENOUS at 16:02

## 2018-04-29 RX ADMIN — CEFTRIAXONE SCH MLS/HR: 1 INJECTION, SOLUTION INTRAVENOUS at 09:12

## 2018-04-29 RX ADMIN — BRIMONIDINE TARTRATE SCH DROP: 1.5 SOLUTION/ DROPS OPHTHALMIC at 13:33

## 2018-04-29 RX ADMIN — BRIMONIDINE TARTRATE SCH DROP: 1.5 SOLUTION/ DROPS OPHTHALMIC at 22:03

## 2018-04-29 RX ADMIN — METHYLPREDNISOLONE SODIUM SUCCINATE SCH MG: 40 INJECTION, POWDER, FOR SOLUTION INTRAMUSCULAR; INTRAVENOUS at 23:45

## 2018-04-29 RX ADMIN — IPRATROPIUM BROMIDE AND ALBUTEROL SULFATE SCH ML: .5; 3 SOLUTION RESPIRATORY (INHALATION) at 21:47

## 2018-04-29 NOTE — CP.PCM.CON
History of Present Illness





- History of Present Illness


History of Present Illness: 





Mr. Salazar is an 82-year-old  right-handed man with a past medical 

history of COPD, deafness and blindness, alcoholism, who presented with COPD 

exacerbation, was confused, had a CT scan of the head done that showed a small 

left subdural hemorrhage.  Repeat CT showed stability.  According to the wife, 

the patient fell and hit his head about 2 weeks ago and only told her recently.

  The patient is currently confused, but he was able to follow commands and 

answered questions despite being very hard of hearing. 





Review of Systems





- Review of Systems


All systems: reviewed and no additional remarkable complaints except





Past Patient History





- Infectious Disease


Hx of Infectious Diseases: None





- Tetanus Immunizations


Tetanus Immunization: Up to Date





- Past Social History


Smoking Status: Former Smoker





- CARDIAC


Hx Cardiac Disorders: Yes (CARDIAC STNEET 2003)


Hx Hypercholesterolemia: Yes


Hx Hypertension: Yes


Hx Pacemaker: No





- PULMONARY


Hx Respiratory Disorders: Yes (SMOKED MARIJUANA H/O)


Hx Chronic Obstructive Pulmonary Disease (COPD): Yes





- NEUROLOGICAL


Hx Neurological Disorder: Yes





- HEENT


Hx HEENT Problems: Yes


Hx Blind: Yes (RIGHT EYE)


Hx Cataracts: Yes (LEFT EYE)


Hx Deafness: Yes (RIGHT EAR)





- RENAL


Hx Chronic Kidney Disease: No





- ENDOCRINE/METABOLIC


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


Hx Hypothyroidism: Yes





- HEMATOLOGICAL/ONCOLOGICAL


Hx Blood Disorders: No





- INTEGUMENTARY


Hx Dermatological Problems: No





- MUSCULOSKELETAL/RHEUMATOLOGICAL


Hx Musculoskeletal Disorders: No


Hx Falls: Yes





- GASTROINTESTINAL


Hx Gastrointestinal Disorders: No





- GENITOURINARY/GYNECOLOGICAL


Hx Genitourinary Disorders: No





- PSYCHIATRIC


Hx Depression: No


Hx Emotional Abuse: No


Hx Physical Abuse: No


Hx Substance Use: No





- SURGICAL HISTORY


Hx Surgeries: Yes (CARDIAC STENT 2003, R EYE SX, AP, T&A)





- ANESTHESIA


Hx Anesthesia: Yes


Hx Anesthesia Reactions: No


Hx Malignant Hyperthermia: No





Meds


Allergies/Adverse Reactions: 


 Allergies











Allergy/AdvReac Type Severity Reaction Status Date / Time


 


No Known Allergies Allergy   Verified 04/28/18 15:33














- Medications


Medications: 


 Current Medications





Atorvastatin Calcium (Lipitor)  20 mg PO DAILY [facility]


   Last Admin: 04/29/18 11:53 Dose:  20 mg


Brimonidine Tartrate (Alphagan P 0.15% Opht)  0 drop OS Q8H [facility]


   Last Admin: 04/29/18 13:33 Dose:  1 drop


Budesonide (Pulmicort Respules)  0.25 mg IH [id number] [facility]


   Last Admin: 04/29/18 08:46 Dose:  0.25 mg


Home Med (Home Med)  1 unit OS HS [facility]


Ceftriaxone Sodium (Rocephin 1 Gram Ivpb)  1 gm in 100 mls @ 100 mls/hr IVPB 

DAILY [facility]


   PRN Reason: Protocol


   Last Admin: 04/29/18 09:12 Dose:  100 mls/hr


Azithromycin (Zithromax 500mg In Ns)  500 mg in 250 mls @ 167 mls/hr IVPB DAILY 

[facility]


   PRN Reason: Protocol


   Last Admin: 04/29/18 09:14 Dose:  167 mls/hr


Dexmedetomidine HCl (Precedex 400mcg/100ml)  400 mcg in 100 mls @ 4.309 mls/hr 

IV .[id number] PRN; Protocol; 0.2 MCG/KG/HR


   PRN Reason: Agitation


   Last Titration: 04/29/18 09:00 Dose:  0 mcg/kg/hr, 0 mls/hr


Lactated Ringer's 1,000 ml/ IV (SUPPLIES)  1,000 mls @ 100 mls/hr IV ONCE ONE


   Stop: 04/29/18 18:48


   Last Admin: 04/29/18 09:18 Dose:  100 mls/hr


Levothyroxine Sodium (Synthroid)  125 mcg PO 0600 [facility]


Lisinopril (Zestril)  20 mg PO DAILY [facility]


   Last Admin: 04/29/18 11:52 Dose:  20 mg


Methylprednisolone (Solu-Medrol)  40 mg IVP Q8H [facility]


   Last Admin: 04/29/18 16:02 Dose:  40 mg


Metoprolol Tartrate (Lopressor)  25 mg PO BID [facility]


   Last Admin: 04/29/18 09:40 Dose:  Not Given


Pantoprazole Sodium (Protonix Inj)  40 mg IVP DAILY [facility]


   Last Admin: 04/29/18 09:12 Dose:  40 mg











Physical Exam





- Neurological Exam


Neurological exam: Alert, Altered, CN II-XII Intact


Additional comments: 





Blind and deaf, moves all extremities with generalized weakness, no focal 

neurological deficits.  Sensation is intact and reflexes are normal.  gait was 

not assessed. 





Results





- Vital Signs


Recent Vital Signs: 


 Last Vital Signs











Temp  97.5 F L  04/29/18 13:00


 


Pulse  97 H  04/29/18 14:00


 


Resp  24   04/29/18 14:00


 


BP  154/92 H  04/29/18 14:00


 


Pulse Ox  100   04/29/18 14:00














- Labs


Result Diagrams: 


 04/29/18 05:30





 04/29/18 05:30


Labs: 


 Laboratory Results - last 24 hr











  04/28/18 04/28/18 04/28/18





  22:45 22:45 22:45


 


WBC  11.9 H  


 


RBC  4.72  


 


Hgb  15.0  


 


Hct  43.0  


 


MCV  91.1  


 


MCH  31.8  


 


MCHC  34.9  


 


RDW  13.9  


 


Plt Count  240  


 


MPV  9.9  


 


Gran %  95.2 H  


 


Lymph % (Auto)  3.7 L  


 


Mono % (Auto)  1.1  


 


Eos % (Auto)  0.0 L  


 


Baso % (Auto)  0.0  


 


Gran #  11.37 H  


 


Lymph # (Auto)  0.4 L  


 


Mono # (Auto)  0.1  


 


Eos # (Auto)  0.0  


 


Baso # (Auto)  0.00  


 


Neutrophils % (Manual)  94 H  


 


Lymphocytes % (Manual)  5 L  


 


Monocytes % (Manual)  1  


 


Platelet Evaluation  Normal  


 


pCO2   


 


pO2   


 


HCO3   


 


ABG pH   


 


ABG Total CO2   


 


ABG O2 Saturation   


 


ABG O2 Content   


 


ABG Base Excess   


 


ABG Hemoglobin   


 


ABG Carboxyhemoglobin   


 


POC ABG HHb (Measured)   


 


ABG Methemoglobin   


 


ABG O2 Capacity   


 


Hgb O2 Saturation   


 


FiO2   


 


Sodium   142 


 


Potassium   4.1 


 


Chloride   105 


 


Carbon Dioxide   21 


 


Anion Gap   21 H 


 


BUN   13 


 


Creatinine   0.9 


 


Est GFR ( Amer)   > 60 


 


Est GFR (Non-Af Amer)   > 60 


 


POC Glucose (mg/dL)   


 


Random Glucose   184 H 


 


Calcium   9.8 


 


Phosphorus   3.2 


 


Magnesium   1.9 


 


Total Bilirubin   0.6 


 


AST   26 


 


ALT   32 


 


Alkaline Phosphatase   77 


 


Total Protein   7.6 


 


Albumin   4.6 


 


Globulin   3.0 


 


Albumin/Globulin Ratio   1.6 


 


Alcohol, Quantitative    < 10














  04/28/18 04/29/18 04/29/18





  23:07 01:40 05:30


 


WBC    19.2 H D


 


RBC    4.72


 


Hgb    15.0


 


Hct    43.4


 


MCV    91.9


 


MCH    31.8


 


MCHC    34.6


 


RDW    14.0


 


Plt Count    248


 


MPV    10.1


 


Gran %    91.7 H


 


Lymph % (Auto)    4.5 L


 


Mono % (Auto)    3.6


 


Eos % (Auto)    0.1 L


 


Baso % (Auto)    0.1


 


Gran #    17.57 H


 


Lymph # (Auto)    0.9 L


 


Mono # (Auto)    0.7 H


 


Eos # (Auto)    0.0


 


Baso # (Auto)    0.01


 


Neutrophils % (Manual)   


 


Lymphocytes % (Manual)   


 


Monocytes % (Manual)   


 


Platelet Evaluation   


 


pCO2   31 L 


 


pO2   81.0 


 


HCO3   20.6 L 


 


ABG pH   7.43 


 


ABG Total CO2   21.6 L 


 


ABG O2 Saturation   99.0 H 


 


ABG O2 Content   17.3 


 


ABG Base Excess   -2.8 L 


 


ABG Hemoglobin   12.8 


 


ABG Carboxyhemoglobin   1.9 H 


 


POC ABG HHb (Measured)   1.0 


 


ABG Methemoglobin   1.1 


 


ABG O2 Capacity   17.5 


 


Hgb O2 Saturation   95.9 


 


FiO2   32.0 


 


Sodium   


 


Potassium   


 


Chloride   


 


Carbon Dioxide   


 


Anion Gap   


 


BUN   


 


Creatinine   


 


Est GFR ( Amer)   


 


Est GFR (Non-Af Amer)   


 


POC Glucose (mg/dL)  164 H  


 


Random Glucose   


 


Calcium   


 


Phosphorus   


 


Magnesium   


 


Total Bilirubin   


 


AST   


 


ALT   


 


Alkaline Phosphatase   


 


Total Protein   


 


Albumin   


 


Globulin   


 


Albumin/Globulin Ratio   


 


Alcohol, Quantitative   














  04/29/18 04/29/18 04/29/18





  05:30 11:39 15:26


 


WBC   


 


RBC   


 


Hgb   


 


Hct   


 


MCV   


 


MCH   


 


MCHC   


 


RDW   


 


Plt Count   


 


MPV   


 


Gran %   


 


Lymph % (Auto)   


 


Mono % (Auto)   


 


Eos % (Auto)   


 


Baso % (Auto)   


 


Gran #   


 


Lymph # (Auto)   


 


Mono # (Auto)   


 


Eos # (Auto)   


 


Baso # (Auto)   


 


Neutrophils % (Manual)   


 


Lymphocytes % (Manual)   


 


Monocytes % (Manual)   


 


Platelet Evaluation   


 


pCO2   


 


pO2   


 


HCO3   


 


ABG pH   


 


ABG Total CO2   


 


ABG O2 Saturation   


 


ABG O2 Content   


 


ABG Base Excess   


 


ABG Hemoglobin   


 


ABG Carboxyhemoglobin   


 


POC ABG HHb (Measured)   


 


ABG Methemoglobin   


 


ABG O2 Capacity   


 


Hgb O2 Saturation   


 


FiO2   


 


Sodium  144  


 


Potassium  4.2  


 


Chloride  105  


 


Carbon Dioxide  24  


 


Anion Gap  19  


 


BUN  15  


 


Creatinine  0.9  


 


Est GFR ( Amer)  > 60  


 


Est GFR (Non-Af Amer)  > 60  


 


POC Glucose (mg/dL)   126 H  147 H


 


Random Glucose  157 H  


 


Calcium  9.9  


 


Phosphorus   


 


Magnesium   


 


Total Bilirubin  0.6  


 


AST  25  


 


ALT  26  


 


Alkaline Phosphatase  77  


 


Total Protein  7.7  


 


Albumin  4.6  


 


Globulin  3.1  


 


Albumin/Globulin Ratio  1.5  


 


Alcohol, Quantitative   














Assessment & Plan


(1) SDH (subdural hematoma)


Assessment and Plan: 


The SDH is stable and is not causing any symptoms.  Observation and repeat CT 

head in two weeks is recommended.  The patient is de-conditioned and could 

benefit from acute rehab.  DVT Px is okay with SQ heparin if needed.  PT/OT 

eval and treatment is recommended.  Continue supportive care per medical team.





Thank you.  No further recommendations from a neurological standpoint. 


Status: Chronic

## 2018-04-29 NOTE — PN
DATE:  04/29/2018



SUBJECTIVE:  The patient is seen and examined at bedside.  He is confused. 

At times, agitated; however, mostly calm.  A repeated CAT scan overnight

did not reveal any progression of subdural hematoma.



PHYSICAL EXAMINATION:

VITAL SIGNS:  Blood pressure 124/75, oxygen saturation 100%, heart rate

105, respiratory rate 22.

ENT:  Head and neck atraumatic.

LUNGS:  Clear to auscultation bilaterally.

HEART:  Regular rate and rhythm.  S1 and S2 normal.

ABDOMEN:  Soft, nontender and nondistended.

MUSCULOSKELETAL:  No C/C/E.

NEUROLOGIC:  The patient moves all extremities spontaneously.

SKIN:  Moist.

PSYCH:  The patient is alert with a short attention span, confused, at

times agitated.



LABORATORY DATA:  WBC 19.2 (most likely related to steroid therapy),

hemoglobin 15, platelet count 248.  Sodium 144, potassium 4.2, chloride

105, carbon dioxide 24, BUN 15, creatinine 0.9, glucose 157, INR 1.15.



MEDICATIONS:  Lipitor, Pulmicort, lisinopril, Solu-Medrol 40 mg IV every 8

hours, metoprolol 25 mg p.o. b.i.d., Protonix, ceftriaxone, azithromycin.



ASSESSMENT AND PLAN:  This is an 82-year-old gentleman who presents with

chronic obstructive pulmonary exacerbation and accidentally found to have

very small subdural hematoma, which on repeated CAT scan was barely shown

and no mass effect was seen according to official report.  At present time,

the patient is continuing to go on antibiotics, steroid taper and

bronchodilators.  He is at high risk for delirium and is exhibiting 

signs of acute delirium.  The patient is hard of hearing and with

poor eyesight.  Also, he is on steroid therapy.  We will continue

antipsychotic p.r.n.; however, we will use it very conservatively with

careful attention to risk benefit ratio.  We will try to utilize

non-medicamentous measures as well, including optimizing his sleep and

circadian rhythms, early mobilization, frequent reorientation, exposing to

as much light as possible during the daytime and as little as possible

during the nighttime.  Exposure to familiar objects will also be encouraged

when the family member come at bedside.  We will continue with deep venous

thrombosis and gastrointestinal prophylaxis.  I spoke with Dr. Oakes

who cleared him for oral hydration and nutrition and asked to transfer the

patient out of Intensive Care Unit.



ccm time 40 min







__________________________________________

Derik Bernal MD





DD:  04/29/2018 9:05:05

DT:  04/29/2018 10:52:45

[facility] # [id number]

ESTEFANY

## 2018-04-29 NOTE — CP.CCUPN
<John Harrell - Last Filed: 04/29/18 13:35>





CCU Subjective





- Physician Review


Subjective (Free Text): 


Patient seen and examined at bedside in no acute distress. Patient overnight 

experienced ICU delirium. ROS unobtainable as patient was in a state of 

delirium. 


Critical Care Time Spent (in minutes): 30





CCU Objective





- Vital Signs / Intake & Output


Vital Signs (Last 4 hours): 


Vital Signs











  Temp Pulse Resp BP Pulse Ox


 


 04/29/18 09:40   101 H   124/75 


 


 04/29/18 08:00  97.6 F  101 H  22  163/75 H  100


 


 04/29/18 06:59   99 H   











Intake and Output (Last 8hrs): 


 Intake & Output











 04/28/18 04/29/18 04/29/18





 22:59 06:59 14:59


 


Intake Total 350 0 2


 


Output Total 160 300 


 


Balance 190 -300 2


 


Weight 86.183 kg  


 


Intake:   


 


   0 2


 


    IVPB 350  


 


    Left Hand  0 


 


  Oral  0 


 


Output:   


 


  Urine 160 300 


 


    Urine, Voided 160 300 


 


Other:   


 


  Voiding Method Urinal  


 


  # Bowel Movements  0 














- Physical Exam


Head: Positive for: Atraumatic, Normocephalic


Pupils: Positive for: PERRL, Other (Atrophy of the right eye. )


Extroacular Muscles: Positive for: EOMI


Conjunctiva: Positive for: Normal


Mouth: Positive for: Moist Mucous Membranes


Neck: Positive for: Normal Range of Motion


Respiratory/Chest: Positive for: Wheezes (Bilateral wheezing).  Negative for: 

Good Air Exchange (Decreased air entry. ), Rales, Rhonchi, Tachypneic


Cardiovascular: Positive for: Regular Rate and Rhythm, Normal S1, S2.  Negative 

for: Murmurs


Abdomen: Negative for: Tenderness, Distention, Peritoneal Signs


Back: Positive for: Normal Inspection


Upper Extremity: Positive for: Normal Inspection.  Negative for: Cyanosis, Edema


Lower Extremity: Positive for: Normal Inspection.  Negative for: Edema


Neurological: Positive for: Other (AMS)


Skin: Positive for: Warm, Dry, Normal Color.  Negative for: Rashes





- Medications


Active Medications: 


Active Medications











Generic Name Dose Route Start Last Admin





  Trade Name Freq  PRN Reason Stop Dose Admin


 


Atorvastatin Calcium  20 mg  04/29/18 10:00  





  Lipitor  PO   





  DAILY [facility]   


 


Brimonidine Tartrate  0 drop  04/29/18 14:00  





  Alphagan P 0.15% Opht  OS   





  Q8H LEONARDA   


 


Budesonide  0.25 mg  04/28/18 20:00  04/29/18 08:46





  Pulmicort Respules  IH   0.25 mg





  [id number] LEONARDA   Administration


 


Home Med  1 unit  04/29/18 22:00  





  Home Med  OS   





  [facility]   


 


Ceftriaxone Sodium  1 gm in 100 mls @ 100 mls/hr  04/28/18 15:30  04/29/18 09:12





  Rocephin 1 Gram Ivpb  IVPB   100 mls/hr





  DAILY LEONARDA   Administration





  Protocol   


 


Azithromycin  500 mg in 250 mls @ 167 mls/hr  04/28/18 15:30  04/29/18 09:14





  Zithromax 500mg In Ns  IVPB   167 mls/hr





  DAILY [facility]   Administration





  Protocol   


 


Dexmedetomidine HCl  400 mcg in 100 mls @ 4.309 mls/hr  04/29/18 08:20  04/29/ 18 09:00





  Precedex 400mcg/100ml  IV   0 mcg/kg/hr





  [id number] PRN   0 mls/hr





  Agitation   Titration





  Protocol   





  0.2 MCG/KG/HR   


 


Lactated Ringer's 1,000 ml/ IV  1,000 mls @ 100 mls/hr  04/29/18 08:49  04/29/ 18 09:18





  SUPPLIES  IV  04/29/18 18:48  100 mls/hr





  ONCE ONE   Administration


 


Levothyroxine Sodium  125 mcg  04/30/18 06:00  





  Synthroid  PO   





  0600 [facility]   


 


Lisinopril  20 mg  04/29/18 10:00  





  Zestril  PO   





  DAILY [facility]   


 


Methylprednisolone  40 mg  04/28/18 15:30  04/29/18 09:13





  Solu-Medrol  IVP   40 mg





  Q8H LEONARDA   Administration


 


Metoprolol Tartrate  25 mg  04/28/18 18:00  04/29/18 09:40





  Lopressor  PO   Not Given





  BID [facility]   


 


Pantoprazole Sodium  40 mg  04/28/18 15:45  04/29/18 09:12





  Protonix Inj  IVP   40 mg





  DAILY [facility]ONAR[facility]   Administration














- Patient Studies


Lab Studies: 


 Lab Studies











  04/29/18 04/29/18 04/29/18 Range/Units





  05:30 05:30 01:40 


 


WBC   19.2 H D   (4.5-11.0)  10^3/ul


 


RBC   4.72   (3.5-6.1)  10^6/uL


 


Hgb   15.0   (14.0-18.0)  g/dL


 


Hct   43.4   (42.0-52.0)  %


 


MCV   91.9   (80.0-105.0)  fl


 


MCH   31.8   (25.0-35.0)  pg


 


MCHC   34.6   (31.0-37.0)  g/dl


 


RDW   14.0   (11.5-14.5)  %


 


Plt Count   248   (120.0-450.0)  10^3/uL


 


MPV   10.1   (7.0-11.0)  fl


 


Gran %   91.7 H   (50.0-68.0)  %


 


Lymph % (Auto)   4.5 L   (22.0-35.0)  %


 


Mono % (Auto)   3.6   (1.0-6.0)  %


 


Eos % (Auto)   0.1 L   (1.5-5.0)  %


 


Baso % (Auto)   0.1   (0.0-3.0)  %


 


Gran #   17.57 H   (1.4-6.5)  


 


Lymph # (Auto)   0.9 L   (1.2-3.4)  


 


Mono # (Auto)   0.7 H   (0.1-0.6)  


 


Eos # (Auto)   0.0   (0.0-0.7)  


 


Baso # (Auto)   0.01   (0.0-2.0)  K/mm3


 


Neutrophils % (Manual)     (50.0-70.0)  %


 


Lymphocytes % (Manual)     (22.0-35.0)  %


 


Monocytes % (Manual)     (1.0-6.0)  %


 


Platelet Evaluation     (NORMAL)  


 


pCO2    31 L  (35-45)  mm/Hg


 


pO2    81.0  (30-55)  mm/Hg


 


HCO3    20.6 L  (21-28)  mmol/L


 


ABG pH    7.43  (7.35-7.45)  


 


ABG Total CO2    21.6 L  (22-28)  mmol.L


 


ABG O2 Saturation    99.0 H  (95-98)  %


 


ABG O2 Content    17.3  (15-23)  ML/dl


 


ABG Base Excess    -2.8 L  (-2.0-3.0)  mmol/L


 


ABG Hemoglobin    12.8  (11.7-17.4)  g/dL


 


ABG Carboxyhemoglobin    1.9 H  (0.5-1.5)  %


 


POC ABG HHb (Measured)    1.0  (0-5)  %


 


ABG Methemoglobin    1.1  (0.0-3.0)  %


 


ABG O2 Capacity    17.5  (16-24)  mL/dl


 


ABG Potassium     (3.6-5.2)  mmol/L


 


VBG pH     (7.32-7.43)  


 


VBG pCO2     (40-60)  


 


VBG HCO3     (21-28)  mmol/l


 


VBG Total CO2     (22-28)  mmol.L


 


VBG O2 Sat (Calc)     (40-65)  %


 


VBG Base Excess     (0.0-2.0)  mmol/L


 


VBG Potassium     (3.6-5.2)  mmol/L


 


Hgb O2 Saturation    95.9  (95.0-98.0)  %


 


Sodium  144    (132-148)  mmol/L


 


Chloride  105    ()  mmol/L


 


Glucose     ()  mg/dl


 


Lactate     (0.7-2.1)  mmol/L


 


FiO2    32.0  %


 


Potassium  4.2    (3.6-5.0)  mmol/L


 


Carbon Dioxide  24    (21-33)  mmol/L


 


Anion Gap  19    (10-20)  


 


BUN  15    (7-21)  mg/dL


 


Creatinine  0.9    (0.8-1.5)  mg/dl


 


Est GFR (African Amer)  > 60    


 


Est GFR (Non-Af Amer)  > 60    


 


POC Glucose (mg/dL)     ()  mg/dL


 


Random Glucose  157 H    ()  mg/dL


 


Calcium  9.9    (8.4-10.5)  mg/dL


 


Phosphorus     (2.5-4.5)  mg/dL


 


Magnesium     (1.7-2.2)  mg/dL


 


Total Bilirubin  0.6    (0.2-1.3)  mg/dL


 


AST  25    (17-59)  U/L


 


ALT  26    (7-56)  U/L


 


Alkaline Phosphatase  77    ()  U/L


 


Total Protein  7.7    (5.8-8.3)  g/dL


 


Albumin  4.6    (3.0-4.8)  g/dL


 


Globulin  3.1    gm/dL


 


Albumin/Globulin Ratio  1.5    (1.1-1.8)  


 


Arterial Blood Potassium     (3.6-5.2)  mmol/L


 


Venous Blood Potassium     (3.6-5.2)  mmol/L


 


Alcohol, Quantitative     (0-10)  mg/dL














  04/28/18 04/28/18 04/28/18 Range/Units





  23:07 22:45 22:45 


 


WBC     (4.5-11.0)  10^3/ul


 


RBC     (3.5-6.1)  10^6/uL


 


Hgb     (14.0-18.0)  g/dL


 


Hct     (42.0-52.0)  %


 


MCV     (80.0-105.0)  fl


 


MCH     (25.0-35.0)  pg


 


MCHC     (31.0-37.0)  g/dl


 


RDW     (11.5-14.5)  %


 


Plt Count     (120.0-450.0)  10^3/uL


 


MPV     (7.0-11.0)  fl


 


Gran %     (50.0-68.0)  %


 


Lymph % (Auto)     (22.0-35.0)  %


 


Mono % (Auto)     (1.0-6.0)  %


 


Eos % (Auto)     (1.5-5.0)  %


 


Baso % (Auto)     (0.0-3.0)  %


 


Gran #     (1.4-6.5)  


 


Lymph # (Auto)     (1.2-3.4)  


 


Mono # (Auto)     (0.1-0.6)  


 


Eos # (Auto)     (0.0-0.7)  


 


Baso # (Auto)     (0.0-2.0)  K/mm3


 


Neutrophils % (Manual)     (50.0-70.0)  %


 


Lymphocytes % (Manual)     (22.0-35.0)  %


 


Monocytes % (Manual)     (1.0-6.0)  %


 


Platelet Evaluation     (NORMAL)  


 


pCO2     (35-45)  mm/Hg


 


pO2     (30-55)  mm/Hg


 


HCO3     (21-28)  mmol/L


 


ABG pH     (7.35-7.45)  


 


ABG Total CO2     (22-28)  mmol.L


 


ABG O2 Saturation     (95-98)  %


 


ABG O2 Content     (15-23)  ML/dl


 


ABG Base Excess     (-2.0-3.0)  mmol/L


 


ABG Hemoglobin     (11.7-17.4)  g/dL


 


ABG Carboxyhemoglobin     (0.5-1.5)  %


 


POC ABG HHb (Measured)     (0-5)  %


 


ABG Methemoglobin     (0.0-3.0)  %


 


ABG O2 Capacity     (16-24)  mL/dl


 


ABG Potassium     (3.6-5.2)  mmol/L


 


VBG pH     (7.32-7.43)  


 


VBG pCO2     (40-60)  


 


VBG HCO3     (21-28)  mmol/l


 


VBG Total CO2     (22-28)  mmol.L


 


VBG O2 Sat (Calc)     (40-65)  %


 


VBG Base Excess     (0.0-2.0)  mmol/L


 


VBG Potassium     (3.6-5.2)  mmol/L


 


Hgb O2 Saturation     (95.0-98.0)  %


 


Sodium    142  (132-148)  mmol/L


 


Chloride    105  ()  mmol/L


 


Glucose     ()  mg/dl


 


Lactate     (0.7-2.1)  mmol/L


 


FiO2     %


 


Potassium    4.1  (3.6-5.0)  mmol/L


 


Carbon Dioxide    21  (21-33)  mmol/L


 


Anion Gap    21 H  (10-20)  


 


BUN    13  (7-21)  mg/dL


 


Creatinine    0.9  (0.8-1.5)  mg/dl


 


Est GFR (African Amer)    > 60  


 


Est GFR (Non-Af Amer)    > 60  


 


POC Glucose (mg/dL)  164 H    ()  mg/dL


 


Random Glucose    184 H  ()  mg/dL


 


Calcium    9.8  (8.4-10.5)  mg/dL


 


Phosphorus    3.2  (2.5-4.5)  mg/dL


 


Magnesium    1.9  (1.7-2.2)  mg/dL


 


Total Bilirubin    0.6  (0.2-1.3)  mg/dL


 


AST    26  (17-59)  U/L


 


ALT    32  (7-56)  U/L


 


Alkaline Phosphatase    77  ()  U/L


 


Total Protein    7.6  (5.8-8.3)  g/dL


 


Albumin    4.6  (3.0-4.8)  g/dL


 


Globulin    3.0  gm/dL


 


Albumin/Globulin Ratio    1.6  (1.1-1.8)  


 


Arterial Blood Potassium     (3.6-5.2)  mmol/L


 


Venous Blood Potassium     (3.6-5.2)  mmol/L


 


Alcohol, Quantitative   < 10   (0-10)  mg/dL














  04/28/18 04/28/18 04/28/18 Range/Units





  22:45 15:40 15:35 


 


WBC  11.9 H    (4.5-11.0)  10^3/ul


 


RBC  4.72    (3.5-6.1)  10^6/uL


 


Hgb  15.0    (14.0-18.0)  g/dL


 


Hct  43.0    (42.0-52.0)  %


 


MCV  91.1    (80.0-105.0)  fl


 


MCH  31.8    (25.0-35.0)  pg


 


MCHC  34.9    (31.0-37.0)  g/dl


 


RDW  13.9    (11.5-14.5)  %


 


Plt Count  240    (120.0-450.0)  10^3/uL


 


MPV  9.9    (7.0-11.0)  fl


 


Gran %  95.2 H    (50.0-68.0)  %


 


Lymph % (Auto)  3.7 L    (22.0-35.0)  %


 


Mono % (Auto)  1.1    (1.0-6.0)  %


 


Eos % (Auto)  0.0 L    (1.5-5.0)  %


 


Baso % (Auto)  0.0    (0.0-3.0)  %


 


Gran #  11.37 H    (1.4-6.5)  


 


Lymph # (Auto)  0.4 L    (1.2-3.4)  


 


Mono # (Auto)  0.1    (0.1-0.6)  


 


Eos # (Auto)  0.0    (0.0-0.7)  


 


Baso # (Auto)  0.00    (0.0-2.0)  K/mm3


 


Neutrophils % (Manual)  94 H    (50.0-70.0)  %


 


Lymphocytes % (Manual)  5 L    (22.0-35.0)  %


 


Monocytes % (Manual)  1    (1.0-6.0)  %


 


Platelet Evaluation  Normal    (NORMAL)  


 


pCO2   27 L   (35-45)  mm/Hg


 


pO2   111.0 H  141 H  (30-55)  mm/Hg


 


HCO3   19.7 L   (21-28)  mmol/L


 


ABG pH   7.47 H   (7.35-7.45)  


 


ABG Total CO2   20.5 L   (22-28)  mmol.L


 


ABG O2 Saturation   99.5 H   (95-98)  %


 


ABG O2 Content     (15-23)  ML/dl


 


ABG Base Excess   -2.6 L   (-2.0-3.0)  mmol/L


 


ABG Hemoglobin     (11.7-17.4)  g/dL


 


ABG Carboxyhemoglobin     (0.5-1.5)  %


 


POC ABG HHb (Measured)     (0-5)  %


 


ABG Methemoglobin     (0.0-3.0)  %


 


ABG O2 Capacity     (16-24)  mL/dl


 


ABG Potassium   3.0 L   (3.6-5.2)  mmol/L


 


VBG pH    7.46 H  (7.32-7.43)  


 


VBG pCO2    32.0 L  (40-60)  


 


VBG HCO3    22.8  (21-28)  mmol/l


 


VBG Total CO2    23.8  (22-28)  mmol.L


 


VBG O2 Sat (Calc)    100.3 H  (40-65)  %


 


VBG Base Excess    -0.3 L  (0.0-2.0)  mmol/L


 


VBG Potassium    3.8  (3.6-5.2)  mmol/L


 


Hgb O2 Saturation     (95.0-98.0)  %


 


Sodium   141.0  139.0  (132-148)  mmol/L


 


Chloride   112.0 H  106.0  ()  mmol/L


 


Glucose   169 H  194 H  ()  mg/dl


 


Lactate   1.5  2.0  (0.7-2.1)  mmol/L


 


FiO2   40.0  21.0  %


 


Potassium     (3.6-5.0)  mmol/L


 


Carbon Dioxide     (21-33)  mmol/L


 


Anion Gap     (10-20)  


 


BUN     (7-21)  mg/dL


 


Creatinine     (0.8-1.5)  mg/dl


 


Est GFR (African Amer)     


 


Est GFR (Non-Af Amer)     


 


POC Glucose (mg/dL)     ()  mg/dL


 


Random Glucose     ()  mg/dL


 


Calcium     (8.4-10.5)  mg/dL


 


Phosphorus     (2.5-4.5)  mg/dL


 


Magnesium     (1.7-2.2)  mg/dL


 


Total Bilirubin     (0.2-1.3)  mg/dL


 


AST     (17-59)  U/L


 


ALT     (7-56)  U/L


 


Alkaline Phosphatase     ()  U/L


 


Total Protein     (5.8-8.3)  g/dL


 


Albumin     (3.0-4.8)  g/dL


 


Globulin     gm/dL


 


Albumin/Globulin Ratio     (1.1-1.8)  


 


Arterial Blood Potassium   3.0 L   (3.6-5.2)  mmol/L


 


Venous Blood Potassium    3.8  (3.6-5.2)  mmol/L


 


Alcohol, Quantitative     (0-10)  mg/dL








 Laboratory Results - last 24 hr











  04/28/18 04/28/18 04/28/18





  15:35 15:40 22:45


 


WBC    11.9 H


 


RBC    4.72


 


Hgb    15.0


 


Hct    43.0


 


MCV    91.1


 


MCH    31.8


 


MCHC    34.9


 


RDW    13.9


 


Plt Count    240


 


MPV    9.9


 


Gran %    95.2 H


 


Lymph % (Auto)    3.7 L


 


Mono % (Auto)    1.1


 


Eos % (Auto)    0.0 L


 


Baso % (Auto)    0.0


 


Gran #    11.37 H


 


Lymph # (Auto)    0.4 L


 


Mono # (Auto)    0.1


 


Eos # (Auto)    0.0


 


Baso # (Auto)    0.00


 


Neutrophils % (Manual)    94 H


 


Lymphocytes % (Manual)    5 L


 


Monocytes % (Manual)    1


 


Platelet Evaluation    Normal


 


pCO2   27 L 


 


pO2  141 H  111.0 H 


 


HCO3   19.7 L 


 


ABG pH   7.47 H 


 


ABG Total CO2   20.5 L 


 


ABG O2 Saturation   99.5 H 


 


ABG O2 Content   


 


ABG Base Excess   -2.6 L 


 


ABG Hemoglobin   


 


ABG Carboxyhemoglobin   


 


POC ABG HHb (Measured)   


 


ABG Methemoglobin   


 


ABG O2 Capacity   


 


ABG Potassium   3.0 L 


 


VBG pH  7.46 H  


 


VBG pCO2  32.0 L  


 


VBG HCO3  22.8  


 


VBG Total CO2  23.8  


 


VBG O2 Sat (Calc)  100.3 H  


 


VBG Base Excess  -0.3 L  


 


VBG Potassium  3.8  


 


Hgb O2 Saturation   


 


Sodium  139.0  141.0 


 


Chloride  106.0  112.0 H 


 


Glucose  194 H  169 H 


 


Lactate  2.0  1.5 


 


FiO2  21.0  40.0 


 


Potassium   


 


Carbon Dioxide   


 


Anion Gap   


 


BUN   


 


Creatinine   


 


Est GFR ( Amer)   


 


Est GFR (Non-Af Amer)   


 


POC Glucose (mg/dL)   


 


Random Glucose   


 


Calcium   


 


Phosphorus   


 


Magnesium   


 


Total Bilirubin   


 


AST   


 


ALT   


 


Alkaline Phosphatase   


 


Total Protein   


 


Albumin   


 


Globulin   


 


Albumin/Globulin Ratio   


 


Arterial Blood Potassium   3.0 L 


 


Venous Blood Potassium  3.8  


 


Alcohol, Quantitative   














  04/28/18 04/28/18 04/28/18





  22:45 22:45 23:07


 


WBC   


 


RBC   


 


Hgb   


 


Hct   


 


MCV   


 


MCH   


 


MCHC   


 


RDW   


 


Plt Count   


 


MPV   


 


Gran %   


 


Lymph % (Auto)   


 


Mono % (Auto)   


 


Eos % (Auto)   


 


Baso % (Auto)   


 


Gran #   


 


Lymph # (Auto)   


 


Mono # (Auto)   


 


Eos # (Auto)   


 


Baso # (Auto)   


 


Neutrophils % (Manual)   


 


Lymphocytes % (Manual)   


 


Monocytes % (Manual)   


 


Platelet Evaluation   


 


pCO2   


 


pO2   


 


HCO3   


 


ABG pH   


 


ABG Total CO2   


 


ABG O2 Saturation   


 


ABG O2 Content   


 


ABG Base Excess   


 


ABG Hemoglobin   


 


ABG Carboxyhemoglobin   


 


POC ABG HHb (Measured)   


 


ABG Methemoglobin   


 


ABG O2 Capacity   


 


ABG Potassium   


 


VBG pH   


 


VBG pCO2   


 


VBG HCO3   


 


VBG Total CO2   


 


VBG O2 Sat (Calc)   


 


VBG Base Excess   


 


VBG Potassium   


 


Hgb O2 Saturation   


 


Sodium  142  


 


Chloride  105  


 


Glucose   


 


Lactate   


 


FiO2   


 


Potassium  4.1  


 


Carbon Dioxide  21  


 


Anion Gap  21 H  


 


BUN  13  


 


Creatinine  0.9  


 


Est GFR ( Amer)  > 60  


 


Est GFR (Non-Af Amer)  > 60  


 


POC Glucose (mg/dL)    164 H


 


Random Glucose  184 H  


 


Calcium  9.8  


 


Phosphorus  3.2  


 


Magnesium  1.9  


 


Total Bilirubin  0.6  


 


AST  26  


 


ALT  32  


 


Alkaline Phosphatase  77  


 


Total Protein  7.6  


 


Albumin  4.6  


 


Globulin  3.0  


 


Albumin/Globulin Ratio  1.6  


 


Arterial Blood Potassium   


 


Venous Blood Potassium   


 


Alcohol, Quantitative   < 10 














  04/29/18 04/29/18 04/29/18





  01:40 05:30 05:30


 


WBC   19.2 H D 


 


RBC   4.72 


 


Hgb   15.0 


 


Hct   43.4 


 


MCV   91.9 


 


MCH   31.8 


 


MCHC   34.6 


 


RDW   14.0 


 


Plt Count   248 


 


MPV   10.1 


 


Gran %   91.7 H 


 


Lymph % (Auto)   4.5 L 


 


Mono % (Auto)   3.6 


 


Eos % (Auto)   0.1 L 


 


Baso % (Auto)   0.1 


 


Gran #   17.57 H 


 


Lymph # (Auto)   0.9 L 


 


Mono # (Auto)   0.7 H 


 


Eos # (Auto)   0.0 


 


Baso # (Auto)   0.01 


 


Neutrophils % (Manual)   


 


Lymphocytes % (Manual)   


 


Monocytes % (Manual)   


 


Platelet Evaluation   


 


pCO2  31 L  


 


pO2  81.0  


 


HCO3  20.6 L  


 


ABG pH  7.43  


 


ABG Total CO2  21.6 L  


 


ABG O2 Saturation  99.0 H  


 


ABG O2 Content  17.3  


 


ABG Base Excess  -2.8 L  


 


ABG Hemoglobin  12.8  


 


ABG Carboxyhemoglobin  1.9 H  


 


POC ABG HHb (Measured)  1.0  


 


ABG Methemoglobin  1.1  


 


ABG O2 Capacity  17.5  


 


ABG Potassium   


 


VBG pH   


 


VBG pCO2   


 


VBG HCO3   


 


VBG Total CO2   


 


VBG O2 Sat (Calc)   


 


VBG Base Excess   


 


VBG Potassium   


 


Hgb O2 Saturation  95.9  


 


Sodium    144


 


Chloride    105


 


Glucose   


 


Lactate   


 


FiO2  32.0  


 


Potassium    4.2


 


Carbon Dioxide    24


 


Anion Gap    19


 


BUN    15


 


Creatinine    0.9


 


Est GFR ( Amer)    > 60


 


Est GFR (Non-Af Amer)    > 60


 


POC Glucose (mg/dL)   


 


Random Glucose    157 H


 


Calcium    9.9


 


Phosphorus   


 


Magnesium   


 


Total Bilirubin    0.6


 


AST    25


 


ALT    26


 


Alkaline Phosphatase    77


 


Total Protein    7.7


 


Albumin    4.6


 


Globulin    3.1


 


Albumin/Globulin Ratio    1.5


 


Arterial Blood Potassium   


 


Venous Blood Potassium   


 


Alcohol, Quantitative   














Review of Systems





- Review of Systems


Systems not reviewed;Unavailable: Altered Mental Status





Critical Care Progress Note





- Nutrition


Nutrition: 


 Nutrition











 Category Date Time Status


 


 Heart Healthy Diet [DIET] Diets  04/29/18 Lunch Ordered














Assessment/Plan





- Assessment and Plan (Free Text)


Assessment: 





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 

hypothyroidism, COPD, HLD presenting with COPD exacerbation found to have 

subdural hygromas which are stable and require no further intervention at this 

time. 





Neuro:


-ICU delirium


-Bilateral subdural hygromas


-Neurosurgery consulted





Cardiovascular:


-Maintain MAP>65


-Maintain normotensive


-Echocardiogram ordered; results pending


-Continue lipitor, lisinopril, lopressor





Pulmonary


-Maintain SpO2>92%


-Continue with pulmicort, solu-medrol


-F/U with strep pneumo, and legionella antigen 


-Consider BiPAP at night





Infectious Disease


-Blood and urine cultures ordered; results pending


-Procalcitonin ordered; results pending


-Continue azithromycin and rocephin





Gastrointestinal


-Heart healthy diet


-Continue with GI prophylaxis





Hematologic


-Continue to monitor H&H


-SCDs for DVT prophylaxis





Endocrine


-Maintain eugylcemia





Renal


-Maintain euvolemia


-Maintain electrolytes and replete as needed





Disposition: Transfer to telemetry








<Derik Bernal - Last Filed: 04/29/18 17:23>





CCU Objective





- Vital Signs / Intake & Output


Vital Signs (Last 4 hours): 


Vital Signs











  Pulse Resp BP Pulse Ox


 


 04/29/18 17:02  103 H   166/95 H 


 


 04/29/18 14:00  99 H  24  154/92 H  100


 


 04/29/18 13:50  103 H  25 H   100


 


 04/29/18 13:40  105 H  32 H   100


 


 04/29/18 13:30  98 H  21   98











Intake and Output (Last 8hrs): 


 Intake & Output











 04/29/18 04/29/18 04/29/18





 06:59 14:59 22:59


 


Intake Total 0 352 


 


Output Total 300  


 


Balance -300 352 


 


Intake:   


 


  IV 0 352 


 


    Left Hand 0 350 


 


  Oral 0  


 


Output:   


 


  Urine 300  


 


    Urine, Voided 300  


 


Other:   


 


  # Bowel Movements 0  














- Medications


Active Medications: 


Active Medications











Generic Name Dose Route Start Last Admin





  Trade Name Freq  PRN Reason Stop Dose Admin


 


Atorvastatin Calcium  20 mg  04/29/18 10:00  04/29/18 11:53





  Lipitor  PO   20 mg





  DAILY LEONARDA   Administration


 


Brimonidine Tartrate  0 drop  04/29/18 14:00  04/29/18 13:33





  Alphagan P 0.15% Opht  OS   1 drop





  Q8H LEONARDA   Administration


 


Budesonide  0.25 mg  04/28/18 20:00  04/29/18 08:46





  Pulmicort Respules  IH   0.25 mg





  [id number] LEONARDA   Administration


 


Home Med  1 unit  04/29/18 22:00  





  Home Med  OS   





  HS LEONARDA   


 


Ceftriaxone Sodium  1 gm in 100 mls @ 100 mls/hr  04/28/18 15:30  04/29/18 09:12





  Rocephin 1 Gram Ivpb  IVPB   100 mls/hr





  DAILY LEONARDA   Administration





  Protocol   


 


Azithromycin  500 mg in 250 mls @ 167 mls/hr  04/28/18 15:30  04/29/18 09:14





  Zithromax 500mg In Ns  IVPB   167 mls/hr





  DAILY LEONARDA   Administration





  Protocol   


 


Dexmedetomidine HCl  400 mcg in 100 mls @ 4.309 mls/hr  04/29/18 08:20  04/29/ 18 09:00





  Precedex 400mcg/100ml  IV   0 mcg/kg/hr





  .[id number] PRN   0 mls/hr





  Agitation   Titration





  Protocol   





  0.2 MCG/KG/HR   


 


Lactated Ringer's 1,000 ml/ IV  1,000 mls @ 100 mls/hr  04/29/18 08:49  04/29/ 18 09:18





  SUPPLIES  IV  04/29/18 18:48  100 mls/hr





  ONCE ONE   Administration


 


Levothyroxine Sodium  125 mcg  04/30/18 06:00  





  Synthroid  PO   





  0600 LEONARDA   


 


Lisinopril  20 mg  04/29/18 10:00  04/29/18 11:52





  Zestril  PO   20 mg





  DAILY LEONARDA   Administration


 


Methylprednisolone  40 mg  04/28/18 15:30  04/29/18 16:02





  Solu-Medrol  IVP   40 mg





  Q8H LEONARDA   Administration


 


Metoprolol Tartrate  25 mg  04/28/18 18:00  04/29/18 17:02





  Lopressor  PO   25 mg





  BID LEONARDA   Administration


 


Pantoprazole Sodium  40 mg  04/28/18 15:45  04/29/18 09:12





  Protonix Inj  IVP   40 mg





  DAILY LEONARDA   Administration














- Patient Studies


Lab Studies: 


 Lab Studies











  04/29/18 04/29/18 04/29/18 Range/Units





  15:26 11:39 05:30 


 


WBC     (4.5-11.0)  10^3/ul


 


RBC     (3.5-6.1)  10^6/uL


 


Hgb     (14.0-18.0)  g/dL


 


Hct     (42.0-52.0)  %


 


MCV     (80.0-105.0)  fl


 


MCH     (25.0-35.0)  pg


 


MCHC     (31.0-37.0)  g/dl


 


RDW     (11.5-14.5)  %


 


Plt Count     (120.0-450.0)  10^3/uL


 


MPV     (7.0-11.0)  fl


 


Gran %     (50.0-68.0)  %


 


Lymph % (Auto)     (22.0-35.0)  %


 


Mono % (Auto)     (1.0-6.0)  %


 


Eos % (Auto)     (1.5-5.0)  %


 


Baso % (Auto)     (0.0-3.0)  %


 


Gran #     (1.4-6.5)  


 


Lymph # (Auto)     (1.2-3.4)  


 


Mono # (Auto)     (0.1-0.6)  


 


Eos # (Auto)     (0.0-0.7)  


 


Baso # (Auto)     (0.0-2.0)  K/mm3


 


Neutrophils % (Manual)     (50.0-70.0)  %


 


Lymphocytes % (Manual)     (22.0-35.0)  %


 


Monocytes % (Manual)     (1.0-6.0)  %


 


Platelet Evaluation     (NORMAL)  


 


pCO2     (35-45)  mm/Hg


 


pO2     ()  mm/Hg


 


HCO3     (21-28)  mmol/L


 


ABG pH     (7.35-7.45)  


 


ABG Total CO2     (22-28)  mmol.L


 


ABG O2 Saturation     (95-98)  %


 


ABG O2 Content     (15-23)  ML/dl


 


ABG Base Excess     (-2.0-3.0)  mmol/L


 


ABG Hemoglobin     (11.7-17.4)  g/dL


 


ABG Carboxyhemoglobin     (0.5-1.5)  %


 


POC ABG HHb (Measured)     (0-5)  %


 


ABG Methemoglobin     (0.0-3.0)  %


 


ABG O2 Capacity     (16-24)  mL/dl


 


Hgb O2 Saturation     (95.0-98.0)  %


 


FiO2     %


 


Sodium    144  (132-148)  mmol/L


 


Potassium    4.2  (3.6-5.0)  mmol/L


 


Chloride    105  ()  mmol/L


 


Carbon Dioxide    24  (21-33)  mmol/L


 


Anion Gap    19  (10-20)  


 


BUN    15  (7-21)  mg/dL


 


Creatinine    0.9  (0.8-1.5)  mg/dl


 


Est GFR (African Amer)    > 60  


 


Est GFR (Non-Af Amer)    > 60  


 


POC Glucose (mg/dL)  147 H  126 H   ()  mg/dL


 


Random Glucose    157 H  ()  mg/dL


 


Calcium    9.9  (8.4-10.5)  mg/dL


 


Phosphorus     (2.5-4.5)  mg/dL


 


Magnesium     (1.7-2.2)  mg/dL


 


Total Bilirubin    0.6  (0.2-1.3)  mg/dL


 


AST    25  (17-59)  U/L


 


ALT    26  (7-56)  U/L


 


Alkaline Phosphatase    77  ()  U/L


 


Total Protein    7.7  (5.8-8.3)  g/dL


 


Albumin    4.6  (3.0-4.8)  g/dL


 


Globulin    3.1  gm/dL


 


Albumin/Globulin Ratio    1.5  (1.1-1.8)  


 


Alcohol, Quantitative     (0-10)  mg/dL














  04/29/18 04/29/18 04/28/18 Range/Units





  05:30 01:40 23:07 


 


WBC  19.2 H D    (4.5-11.0)  10^3/ul


 


RBC  4.72    (3.5-6.1)  10^6/uL


 


Hgb  15.0    (14.0-18.0)  g/dL


 


Hct  43.4    (42.0-52.0)  %


 


MCV  91.9    (80.0-105.0)  fl


 


MCH  31.8    (25.0-35.0)  pg


 


MCHC  34.6    (31.0-37.0)  g/dl


 


RDW  14.0    (11.5-14.5)  %


 


Plt Count  248    (120.0-450.0)  10^3/uL


 


MPV  10.1    (7.0-11.0)  fl


 


Gran %  91.7 H    (50.0-68.0)  %


 


Lymph % (Auto)  4.5 L    (22.0-35.0)  %


 


Mono % (Auto)  3.6    (1.0-6.0)  %


 


Eos % (Auto)  0.1 L    (1.5-5.0)  %


 


Baso % (Auto)  0.1    (0.0-3.0)  %


 


Gran #  17.57 H    (1.4-6.5)  


 


Lymph # (Auto)  0.9 L    (1.2-3.4)  


 


Mono # (Auto)  0.7 H    (0.1-0.6)  


 


Eos # (Auto)  0.0    (0.0-0.7)  


 


Baso # (Auto)  0.01    (0.0-2.0)  K/mm3


 


Neutrophils % (Manual)     (50.0-70.0)  %


 


Lymphocytes % (Manual)     (22.0-35.0)  %


 


Monocytes % (Manual)     (1.0-6.0)  %


 


Platelet Evaluation     (NORMAL)  


 


pCO2   31 L   (35-45)  mm/Hg


 


pO2   81.0   ()  mm/Hg


 


HCO3   20.6 L   (21-28)  mmol/L


 


ABG pH   7.43   (7.35-7.45)  


 


ABG Total CO2   21.6 L   (22-28)  mmol.L


 


ABG O2 Saturation   99.0 H   (95-98)  %


 


ABG O2 Content   17.3   (15-23)  ML/dl


 


ABG Base Excess   -2.8 L   (-2.0-3.0)  mmol/L


 


ABG Hemoglobin   12.8   (11.7-17.4)  g/dL


 


ABG Carboxyhemoglobin   1.9 H   (0.5-1.5)  %


 


POC ABG HHb (Measured)   1.0   (0-5)  %


 


ABG Methemoglobin   1.1   (0.0-3.0)  %


 


ABG O2 Capacity   17.5   (16-24)  mL/dl


 


Hgb O2 Saturation   95.9   (95.0-98.0)  %


 


FiO2   32.0   %


 


Sodium     (132-148)  mmol/L


 


Potassium     (3.6-5.0)  mmol/L


 


Chloride     ()  mmol/L


 


Carbon Dioxide     (21-33)  mmol/L


 


Anion Gap     (10-20)  


 


BUN     (7-21)  mg/dL


 


Creatinine     (0.8-1.5)  mg/dl


 


Est GFR (African Amer)     


 


Est GFR (Non-Af Amer)     


 


POC Glucose (mg/dL)    164 H  ()  mg/dL


 


Random Glucose     ()  mg/dL


 


Calcium     (8.4-10.5)  mg/dL


 


Phosphorus     (2.5-4.5)  mg/dL


 


Magnesium     (1.7-2.2)  mg/dL


 


Total Bilirubin     (0.2-1.3)  mg/dL


 


AST     (17-59)  U/L


 


ALT     (7-56)  U/L


 


Alkaline Phosphatase     ()  U/L


 


Total Protein     (5.8-8.3)  g/dL


 


Albumin     (3.0-4.8)  g/dL


 


Globulin     gm/dL


 


Albumin/Globulin Ratio     (1.1-1.8)  


 


Alcohol, Quantitative     (0-10)  mg/dL














  04/28/18 04/28/18 04/28/18 Range/Units





  22:45 22:45 22:45 


 


WBC    11.9 H  (4.5-11.0)  10^3/ul


 


RBC    4.72  (3.5-6.1)  10^6/uL


 


Hgb    15.0  (14.0-18.0)  g/dL


 


Hct    43.0  (42.0-52.0)  %


 


MCV    91.1  (80.0-105.0)  fl


 


MCH    31.8  (25.0-35.0)  pg


 


MCHC    34.9  (31.0-37.0)  g/dl


 


RDW    13.9  (11.5-14.5)  %


 


Plt Count    240  (120.0-450.0)  10^3/uL


 


MPV    9.9  (7.0-11.0)  fl


 


Gran %    95.2 H  (50.0-68.0)  %


 


Lymph % (Auto)    3.7 L  (22.0-35.0)  %


 


Mono % (Auto)    1.1  (1.0-6.0)  %


 


Eos % (Auto)    0.0 L  (1.5-5.0)  %


 


Baso % (Auto)    0.0  (0.0-3.0)  %


 


Gran #    11.37 H  (1.4-6.5)  


 


Lymph # (Auto)    0.4 L  (1.2-3.4)  


 


Mono # (Auto)    0.1  (0.1-0.6)  


 


Eos # (Auto)    0.0  (0.0-0.7)  


 


Baso # (Auto)    0.00  (0.0-2.0)  K/mm3


 


Neutrophils % (Manual)    94 H  (50.0-70.0)  %


 


Lymphocytes % (Manual)    5 L  (22.0-35.0)  %


 


Monocytes % (Manual)    1  (1.0-6.0)  %


 


Platelet Evaluation    Normal  (NORMAL)  


 


pCO2     (35-45)  mm/Hg


 


pO2     ()  mm/Hg


 


HCO3     (21-28)  mmol/L


 


ABG pH     (7.35-7.45)  


 


ABG Total CO2     (22-28)  mmol.L


 


ABG O2 Saturation     (95-98)  %


 


ABG O2 Content     (15-23)  ML/dl


 


ABG Base Excess     (-2.0-3.0)  mmol/L


 


ABG Hemoglobin     (11.7-17.4)  g/dL


 


ABG Carboxyhemoglobin     (0.5-1.5)  %


 


POC ABG HHb (Measured)     (0-5)  %


 


ABG Methemoglobin     (0.0-3.0)  %


 


ABG O2 Capacity     (16-24)  mL/dl


 


Hgb O2 Saturation     (95.0-98.0)  %


 


FiO2     %


 


Sodium   142   (132-148)  mmol/L


 


Potassium   4.1   (3.6-5.0)  mmol/L


 


Chloride   105   ()  mmol/L


 


Carbon Dioxide   21   (21-33)  mmol/L


 


Anion Gap   21 H   (10-20)  


 


BUN   13   (7-21)  mg/dL


 


Creatinine   0.9   (0.8-1.5)  mg/dl


 


Est GFR (African Amer)   > 60   


 


Est GFR (Non-Af Amer)   > 60   


 


POC Glucose (mg/dL)     ()  mg/dL


 


Random Glucose   184 H   ()  mg/dL


 


Calcium   9.8   (8.4-10.5)  mg/dL


 


Phosphorus   3.2   (2.5-4.5)  mg/dL


 


Magnesium   1.9   (1.7-2.2)  mg/dL


 


Total Bilirubin   0.6   (0.2-1.3)  mg/dL


 


AST   26   (17-59)  U/L


 


ALT   32   (7-56)  U/L


 


Alkaline Phosphatase   77   ()  U/L


 


Total Protein   7.6   (5.8-8.3)  g/dL


 


Albumin   4.6   (3.0-4.8)  g/dL


 


Globulin   3.0   gm/dL


 


Albumin/Globulin Ratio   1.6   (1.1-1.8)  


 


Alcohol, Quantitative  < 10    (0-10)  mg/dL








 Laboratory Results - last 24 hr











  04/28/18 04/28/18 04/28/18





  22:45 22:45 22:45


 


WBC  11.9 H  


 


RBC  4.72  


 


Hgb  15.0  


 


Hct  43.0  


 


MCV  91.1  


 


MCH  31.8  


 


MCHC  34.9  


 


RDW  13.9  


 


Plt Count  240  


 


MPV  9.9  


 


Gran %  95.2 H  


 


Lymph % (Auto)  3.7 L  


 


Mono % (Auto)  1.1  


 


Eos % (Auto)  0.0 L  


 


Baso % (Auto)  0.0  


 


Gran #  11.37 H  


 


Lymph # (Auto)  0.4 L  


 


Mono # (Auto)  0.1  


 


Eos # (Auto)  0.0  


 


Baso # (Auto)  0.00  


 


Neutrophils % (Manual)  94 H  


 


Lymphocytes % (Manual)  5 L  


 


Monocytes % (Manual)  1  


 


Platelet Evaluation  Normal  


 


pCO2   


 


pO2   


 


HCO3   


 


ABG pH   


 


ABG Total CO2   


 


ABG O2 Saturation   


 


ABG O2 Content   


 


ABG Base Excess   


 


ABG Hemoglobin   


 


ABG Carboxyhemoglobin   


 


POC ABG HHb (Measured)   


 


ABG Methemoglobin   


 


ABG O2 Capacity   


 


Hgb O2 Saturation   


 


FiO2   


 


Sodium   142 


 


Potassium   4.1 


 


Chloride   105 


 


Carbon Dioxide   21 


 


Anion Gap   21 H 


 


BUN   13 


 


Creatinine   0.9 


 


Est GFR ( Amer)   > 60 


 


Est GFR (Non-Af Amer)   > 60 


 


POC Glucose (mg/dL)   


 


Random Glucose   184 H 


 


Calcium   9.8 


 


Phosphorus   3.2 


 


Magnesium   1.9 


 


Total Bilirubin   0.6 


 


AST   26 


 


ALT   32 


 


Alkaline Phosphatase   77 


 


Total Protein   7.6 


 


Albumin   4.6 


 


Globulin   3.0 


 


Albumin/Globulin Ratio   1.6 


 


Alcohol, Quantitative    < 10














  04/28/18 04/29/18 04/29/18





  23:07 01:40 05:30


 


WBC    19.2 H D


 


RBC    4.72


 


Hgb    15.0


 


Hct    43.4


 


MCV    91.9


 


MCH    31.8


 


MCHC    34.6


 


RDW    14.0


 


Plt Count    248


 


MPV    10.1


 


Gran %    91.7 H


 


Lymph % (Auto)    4.5 L


 


Mono % (Auto)    3.6


 


Eos % (Auto)    0.1 L


 


Baso % (Auto)    0.1


 


Gran #    17.57 H


 


Lymph # (Auto)    0.9 L


 


Mono # (Auto)    0.7 H


 


Eos # (Auto)    0.0


 


Baso # (Auto)    0.01


 


Neutrophils % (Manual)   


 


Lymphocytes % (Manual)   


 


Monocytes % (Manual)   


 


Platelet Evaluation   


 


pCO2   31 L 


 


pO2   81.0 


 


HCO3   20.6 L 


 


ABG pH   7.43 


 


ABG Total CO2   21.6 L 


 


ABG O2 Saturation   99.0 H 


 


ABG O2 Content   17.3 


 


ABG Base Excess   -2.8 L 


 


ABG Hemoglobin   12.8 


 


ABG Carboxyhemoglobin   1.9 H 


 


POC ABG HHb (Measured)   1.0 


 


ABG Methemoglobin   1.1 


 


ABG O2 Capacity   17.5 


 


Hgb O2 Saturation   95.9 


 


FiO2   32.0 


 


Sodium   


 


Potassium   


 


Chloride   


 


Carbon Dioxide   


 


Anion Gap   


 


BUN   


 


Creatinine   


 


Est GFR ( Amer)   


 


Est GFR (Non-Af Amer)   


 


POC Glucose (mg/dL)  164 H  


 


Random Glucose   


 


Calcium   


 


Phosphorus   


 


Magnesium   


 


Total Bilirubin   


 


AST   


 


ALT   


 


Alkaline Phosphatase   


 


Total Protein   


 


Albumin   


 


Globulin   


 


Albumin/Globulin Ratio   


 


Alcohol, Quantitative   














  04/29/18 04/29/18 04/29/18





  05:30 11:39 15:26


 


WBC   


 


RBC   


 


Hgb   


 


Hct   


 


MCV   


 


MCH   


 


MCHC   


 


RDW   


 


Plt Count   


 


MPV   


 


Gran %   


 


Lymph % (Auto)   


 


Mono % (Auto)   


 


Eos % (Auto)   


 


Baso % (Auto)   


 


Gran #   


 


Lymph # (Auto)   


 


Mono # (Auto)   


 


Eos # (Auto)   


 


Baso # (Auto)   


 


Neutrophils % (Manual)   


 


Lymphocytes % (Manual)   


 


Monocytes % (Manual)   


 


Platelet Evaluation   


 


pCO2   


 


pO2   


 


HCO3   


 


ABG pH   


 


ABG Total CO2   


 


ABG O2 Saturation   


 


ABG O2 Content   


 


ABG Base Excess   


 


ABG Hemoglobin   


 


ABG Carboxyhemoglobin   


 


POC ABG HHb (Measured)   


 


ABG Methemoglobin   


 


ABG O2 Capacity   


 


Hgb O2 Saturation   


 


FiO2   


 


Sodium  144  


 


Potassium  4.2  


 


Chloride  105  


 


Carbon Dioxide  24  


 


Anion Gap  19  


 


BUN  15  


 


Creatinine  0.9  


 


Est GFR ( Amer)  > 60  


 


Est GFR (Non-Af Amer)  > 60  


 


POC Glucose (mg/dL)   126 H  147 H


 


Random Glucose  157 H  


 


Calcium  9.9  


 


Phosphorus   


 


Magnesium   


 


Total Bilirubin  0.6  


 


AST  25  


 


ALT  26  


 


Alkaline Phosphatase  77  


 


Total Protein  7.7  


 


Albumin  4.6  


 


Globulin  3.1  


 


Albumin/Globulin Ratio  1.5  


 


Alcohol, Quantitative   














Critical Care Progress Note





- Nutrition


Nutrition: 


 Nutrition











 Category Date Time Status


 


 Heart Healthy Diet [DIET] Diets  04/29/18 Dinner Ordered














Attending/Attestation





- Attestation


I have personally seen and examined this patient.: Yes


I have fully participated in the care of the patient.: Yes


I have reviewed all pertinent clinical information: Yes


Notes (Text): 





04/29/18 17:22


please see Dr. Bernal's note

## 2018-04-29 NOTE — CT
EXAM:

  CT Head Without Intravenous  Contrast



CLINICAL HISTORY:

  82 years old, male; Signs and symptoms; Altered mental status/memory loss; 

Additional info: Sdh, agitation. Patient disoriented and confused. Best scan 

possible



TECHNIQUE:

  Axial computed tomography images of the head/brain without intravenous 

contrast.  All CT scans at this facility use one or more dose reduction 

techniques, viz.: automated exposure control; ma/kV adjustment per patient size 

(including targeted exams where dose is matched to indication; i.e. head); or 

iterative reconstruction technique.

  Coronal and sagittal reformatted images were created and reviewed.



COMPARISON:

  CT - HEAD W/O CONTRAST 2018-04-28 13:13



FINDINGS:

  Artifacts:  Significant motion artifact.

  Brain:  Unremarkable.  No significant white matter disease.  No edema. No 

intracranial mass, mass effect, or midline shift.

  Ventricles:  No intracranial mass, mass effect, or midline shift.  No 

hydrocephalus.  Peripheral, curvilinear focus of hypodensity adjacent to the 

left parietal lobe may represent chronic subdural hygroma with perhaps small 

focus of acute blood product.  This is unchanged when compared to prior exam.

  Bones/joints:  Unremarkable.  No acute fracture.

  Soft tissues:  Unremarkable.

  Sinuses:  Unremarkable as visualized.  No acute sinusitis.

  Mastoid air cells:  Unremarkable as visualized.  No mastoid effusion.



IMPRESSION:     

1.  Study limited due to significant motion artifact.

2.  Suspect small left subdural hygroma, unchanged when compared to prior 

examination.  No significant mass effect or midline shift.

## 2018-04-29 NOTE — CON
DATE:  04/28/2018



CONSULTATION REPORT



HISTORY OF PRESENT ILLNESS:  This is an 82-year-old gentleman with history

of hypertension, hypothyroidism, COPD, hypercholesterolemia, who presented

today with increased shortness of breath, productive cough with yellowish

sputum, wheezing and headache started this morning.  Even though the

patient took his nebulizer in the morning, it did not provide any relief to

his symptoms and patient's wife decided to take him to [facility] 
ER.  In

terms of headache, the patient tried Tylenol; however, it did not alleviate

the pain.  Subsequent CAT scan in ER at [facility] revealed

small bilateral subdural hematoma with very mild and localized mass effect

according to official read.  Neurosurgery was contacted (Dr. Oakes)

who reviewed the CAT scan; however, decided that the patient does not need

emergent intervention at present time.  No nausea, no vomiting, no

diarrhea, no constipation.  No fever, no chills, no sweats.  No chest pain.



PAST MEDICAL HISTORY:  Hypertension, COPD, hypothyroidism,

hypercholesterolemia.



FAMILY HISTORY:  Noncontributory.



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  The patient reports that he used to be

smoking; however, not anymore.  No alcohol or illicit drug abuse.  The

patient is scheduled for cataract surgery on Thursday on his left eye.



MEDICATION AT HOME:  Spiriva, terazosin, Bactrim, simvastatin, metoprolol,

lisinopril, levothyroxine, indomethacin p.r.n., Benadryl p.r.n., Keflex,

budesonide, aspirin, and Percocet p.r.n.  Of note, the patient had one

stent placed many years ago.



REVIEW OF SYSTEMS:  Review of 12-organ system other than mentioned in

history of present illness is negative.



ALLERGIES:  NKDA.



PHYSICAL EXAMINATION:

VITAL SIGNS:  Temperature 98.4, blood pressure 142/84, heart rate 119,

respiratory rate 19, oxygen saturation 95% on room air.

HEENT:  Atraumatic.

LUNGS:  Few wheezes bilaterally.

HEART:  Regular rate and rhythm.  S1, S2 normal.

ABDOMEN:  Soft, nontender, nondistended.

MUSCULOSKELETAL:  No C/C/E.

NEUROLOGIC:  The patient moves all extremities spontaneously.

SKIN:  Moist.

PSYCHOLOGIC:  The patient is alert and oriented x3.



LABORATORY DATA:  Sodium 144, potassium 4.2, chloride 102, carbon dioxide

29, BUN 12, creatinine 0.9, glucose 129, AST 29, ALT 36, bilirubin 0.6. 

Troponin less than 0.01.  WBC is 13.1, hemoglobin 15.3, platelet count 219.

VBG showed lactic acid 2.1 and pH 7.33.  ABG with lactic acid is pending. 

INR 1.15.



Head CT showed small bilateral subdural collections.  There appears to be

some mild intermediate to high density in the right subdural collection,

which is small in size.  Mild localized mass effect is noted.  Findings

suggest subacute to chronic subdural hematomas.  No intraparenchymal

hemorrhage or infarct clearly identified.



EKG showed normal sinus rhythm.  No specific ischemic changes.  .



Chest x-ray showed no active pulmonary disease, questionable scoliosis.



ASSESSMENT AND PLAN:  This is an 82-year-old gentleman who presented with

chronic obstructive pulmonary disease exacerbation and was found to have

bilateral subdural hematoma with very questionable and mild mass effect. 

In terms of his subdural hematoma, Dr. Oakes was contacted; however,

no immediate intervention was deemed necessary at the present time.  The

patient is neurologically asymptomatic without any motor deficit.  He is

alert, awake and oriented.  He is not somnolent.  Despite his hard hearing,

he is able to maintain thoughtful conversation.  In terms of his chronic

obstructive pulmonary disease exacerbation, the patient will be started on

steroid taper, antibiotics and bronchodilators.  I will continue his

inhaled corticosteroids as well.  I will check blood culture, urine

culture; however, chest x-ray did not show any pneumonia.  As up to 30% of

patients with community-acquired pneumonia may not have infiltrate seen on

chest x-ray, I will also send urine for Streptococcal antigen and

Legionella antigen as well.  I will continue with mechanical DVT

prophylaxis and GI prophylaxis.  Venous blood gas showed some acidosis.  To

better qualitate acidosis, we will obtain ABG and we will consider BiPAP

application at night if chronic or acute C02 retention observed..  The patient 
is not in immediate respiratory

distress.  The patient will be going to Intensive Care Unit for further

management and monitoring.  I will continue statins. I will continue aspirin 
when cleared by neurosurgery and neurology



ccm time 40 min







__________________________________________

Derik Bernal MD



DD:  04/28/2018 15:49:35

DT:  04/28/2018 15:58:45

[facility] # [id number]

ESTEFANY

## 2018-04-29 NOTE — HP
HISTORY OF PRESENT ILLNESS:  This is an 82-year-old male, who is coming to

the hospital with headaches.  The patient was complaining of headache and

prior to coming to the hospital, he also was complaining of wheezing and

shortness of breath.  He was taking his nebulizer, but not having any

improvement with symptoms.  The patient was given Tylenol and his symptoms

were not improved.  The patient's wife also stated that he was confused. 

He has a history of hearing impairment, hypertension, COPD, hypothyroidism.

He is not able to give much of a history because of confusion.  He is

currently in the ICU after he was found to have subdural collections on a

CAT scan.  The patient is restrained, trying to get up, to prevent harm to

himself.



REVIEW OF SYMPTOMS:  The review of symptoms is limited because of the

patient's confusion.



ALLERGIES:  NO KNOWN DRUG ALLERGIES.



HOME MEDICATIONS:  levothyroxine, lisinopril, simvastatin, terazosin,

Spiriva, aspirin, budesonide.



SOCIAL HISTORY:  Unable to obtain.  From the history and the notes that I

reviewed, the patient lost his right eye and some of his hearing during his

service.  He was a smoker in the past, but does not smoke anymore and

denies history of drug use or alcohol.



FAMILY HISTORY:  Unable to obtain.



PHYSICAL EXAMINATION:

VITAL SIGNS:  Temperature is 97.5, pulse of 99, blood pressure is 168/94,

respirations 25, O2 saturation 99%, height is 5 feet 11, weight is 190

pounds, BMI is 26.5.

GENERAL:  The patient lying in bed, uncomfortable, and in no acute

distress.

HEENT:  Unable to do a full eye exam.

NECK:  No JVD, anterior and posterior adenopathy, thyromegaly, or bruits.

CARDIOVASCULAR:  S1 and S2 regular.  No murmur, rubs, or gallop.

LUNGS:  Clear to auscultation bilaterally.  No wheezes, rales, or rhonchi.

ABDOMEN:  Bowel sounds are positive.  Soft, nontender and nondistended.  No

hepatosplenomegaly. No rebound and no guarding.

EXTREMITIES:  No cyanosis, clubbing, or edema.

NEUROLOGIC:  The patient is confused, unable to assess.

PSYCHIATRIC:  Unable to assess.

GENITOURINARY:  No CVA tenderness.

VASCULAR:  2+ pulses in the carotid pulses and pedal pulses.

SKIN:  No erythema or nodules

SPINE:  Shows normal curvature.



LABORATORY DATA:  White count of 13.1, hemoglobin is 15.3, platelet count

is 219.  INR is 1.1.  Ph is7.47 with a pCO2 of 27, O2 saturation 99%. 

Chemistry shows sodium 144, potassium 4.2, creatinine 0.9, troponin 0.01. 

Alcohol less than 10.

CT shows small bilateral subdural collections.

Chest x-ray shows no infiltrates.

EKG shows sinus rhythm with nonspecific ST-T changes.  The QTC is 474.



ASSESSMENT:

1.  Bilateral subdural hematoma.

2.  Delirium.

3.  Hearing impairment.

4.  Chronic obstructive pulmonary disease.

5.  Dyslipidemia.

6.  Hypothyroidism.



PLAN:  The patient is going to be admitted to the hospital to the ICU for

subdural hematoma.  The patient was evaluated by Dr. Oakes and no

surgical interventions needed at this point.  The patient is going to

continue in the ICU.  He was on aspirin, this will be placed on hold.  The

patient is known to have coronary artery disease.  The patient is on

metoprolol, is going to continue Lipitor for dyslipidemia.  He is on

Protonix daily.  He is on lisinopril for hypertension.  The patient was

given steroids.  An echo has been ordered.  We will have Neurology follow

the patient as well.  I will speak to the patient's daughter, Alvina, at

[phone] to give her an update.





__________________________________________

Sebastian Tolentino MD







DD:  04/29/2018 8:39:15

DT:  04/29/2018 16:46:49

[facility] # [id number]

## 2018-04-30 LAB
ALBUMIN SERPL-MCNC: 4.5 G/DL (ref 3–4.8)
ALBUMIN/GLOB SERPL: 1.4 {RATIO} (ref 1.1–1.8)
ALT SERPL-CCNC: 42 U/L (ref 7–56)
APPEARANCE UR: CLEAR
AST SERPL-CCNC: 90 U/L (ref 17–59)
BACTERIA #/AREA URNS HPF: (no result) /[HPF]
BASOPHILS # BLD AUTO: 0.01 K/MM3 (ref 0–2)
BASOPHILS NFR BLD: 0 % (ref 0–3)
BILIRUB UR-MCNC: NEGATIVE MG/DL
BUN SERPL-MCNC: 22 MG/DL (ref 7–21)
CALCIUM SERPL-MCNC: 10 MG/DL (ref 8.4–10.5)
COLOR UR: YELLOW
EOSINOPHIL # BLD: 0 10*3/UL (ref 0–0.7)
EOSINOPHIL NFR BLD: 0 % (ref 1.5–5)
EPI CELLS #/AREA URNS HPF: (no result) /HPF (ref 0–5)
ERYTHROCYTE [DISTWIDTH] IN BLOOD BY AUTOMATED COUNT: 14.4 % (ref 11.5–14.5)
GFR NON-AFRICAN AMERICAN: > 60
GLUCOSE UR STRIP-MCNC: NEGATIVE MG/DL
GRANULOCYTES # BLD: 24.12 10*3/UL (ref 1.4–6.5)
GRANULOCYTES NFR BLD: 92.7 % (ref 50–68)
HGB BLD-MCNC: 15.6 G/DL (ref 14–18)
LEUKOCYTE ESTERASE UR-ACNC: NEGATIVE LEU/UL
LYMPHOCYTES # BLD: 0.6 10*3/UL (ref 1.2–3.4)
LYMPHOCYTES NFR BLD AUTO: 2.3 % (ref 22–35)
MCH RBC QN AUTO: 31.8 PG (ref 25–35)
MCHC RBC AUTO-ENTMCNC: 34.1 G/DL (ref 31–37)
MCV RBC AUTO: 93.3 FL (ref 80–105)
MONOCYTES # BLD AUTO: 1.3 10*3/UL (ref 0.1–0.6)
MONOCYTES NFR BLD: 5 % (ref 1–6)
PH UR STRIP: 6 [PH] (ref 4.7–8)
PLATELET # BLD: 267 10^3/UL (ref 120–450)
PMV BLD AUTO: 10.1 FL (ref 7–11)
PROT UR STRIP-MCNC: (no result) MG/DL
RBC # BLD AUTO: 4.91 10^6/UL (ref 3.5–6.1)
RBC # UR STRIP: (no result) /UL
SP GR UR STRIP: >= 1.03 (ref 1–1.03)
UROBILINOGEN UR STRIP-ACNC: 0.2 E.U./DL
WBC # BLD AUTO: 26 10^3/UL (ref 4.5–11)
WBC #/AREA URNS HPF: (no result) /HPF (ref 0–6)

## 2018-04-30 RX ADMIN — IPRATROPIUM BROMIDE AND ALBUTEROL SULFATE SCH ML: .5; 3 SOLUTION RESPIRATORY (INHALATION) at 06:20

## 2018-04-30 RX ADMIN — AZITHROMYCIN DIHYDRATE SCH MLS/HR: 500 INJECTION, POWDER, LYOPHILIZED, FOR SOLUTION INTRAVENOUS at 10:36

## 2018-04-30 RX ADMIN — BRIMONIDINE TARTRATE SCH DROP: 1.5 SOLUTION/ DROPS OPHTHALMIC at 22:27

## 2018-04-30 RX ADMIN — METHYLPREDNISOLONE SODIUM SUCCINATE SCH: 40 INJECTION, POWDER, FOR SOLUTION INTRAMUSCULAR; INTRAVENOUS at 10:37

## 2018-04-30 RX ADMIN — BRIMONIDINE TARTRATE SCH DROP: 1.5 SOLUTION/ DROPS OPHTHALMIC at 13:20

## 2018-04-30 RX ADMIN — METHYLPREDNISOLONE SODIUM SUCCINATE SCH MG: 40 INJECTION, POWDER, FOR SOLUTION INTRAMUSCULAR; INTRAVENOUS at 17:47

## 2018-04-30 RX ADMIN — METHYLPREDNISOLONE SODIUM SUCCINATE SCH MG: 40 INJECTION, POWDER, FOR SOLUTION INTRAMUSCULAR; INTRAVENOUS at 08:13

## 2018-04-30 RX ADMIN — METOPROLOL TARTRATE PRN MG: 5 INJECTION INTRAVENOUS at 03:23

## 2018-04-30 RX ADMIN — IPRATROPIUM BROMIDE AND ALBUTEROL SULFATE SCH: .5; 3 SOLUTION RESPIRATORY (INHALATION) at 02:59

## 2018-04-30 RX ADMIN — IPRATROPIUM BROMIDE AND ALBUTEROL SULFATE SCH ML: .5; 3 SOLUTION RESPIRATORY (INHALATION) at 20:21

## 2018-04-30 RX ADMIN — CEFTRIAXONE SCH MLS/HR: 1 INJECTION, SOLUTION INTRAVENOUS at 10:36

## 2018-04-30 RX ADMIN — BUDESONIDE SCH MG: 0.25 SUSPENSION RESPIRATORY (INHALATION) at 07:18

## 2018-04-30 RX ADMIN — IPRATROPIUM BROMIDE AND ALBUTEROL SULFATE SCH ML: .5; 3 SOLUTION RESPIRATORY (INHALATION) at 07:18

## 2018-04-30 RX ADMIN — BRIMONIDINE TARTRATE SCH DROP: 1.5 SOLUTION/ DROPS OPHTHALMIC at 05:35

## 2018-04-30 RX ADMIN — BUDESONIDE SCH MG: 0.25 SUSPENSION RESPIRATORY (INHALATION) at 20:22

## 2018-04-30 RX ADMIN — IPRATROPIUM BROMIDE AND ALBUTEROL SULFATE SCH ML: .5; 3 SOLUTION RESPIRATORY (INHALATION) at 13:41

## 2018-04-30 NOTE — CP.PCM.CON
History of Present Illness





- History of Present Illness


History of Present Illness: 


82 year old male with PMH of COPD, deafness using hearing aides, blindness due 

to cataracts, history of alcoholism came in to [facility] because of worsening 

headache for the past several days. He apparently had head trauma about 2 weeks 

ago. He was also having shortness of breath at rest with cough but no phlegm or 

fevers and is currently being treated for COPD exacerbation with antibiotics 

and steroids. CT head showed subdural hematoma and Neuro has seen the patient 

and states the hematoma is stable. His WBC count has been increasing and 

Infectious diseases consult is requested to further evaluate and manage. There 

is no note of fevers, patient is experiencing agitation and confusion acutely, 

no diarrhea, no vomiting, patient is not in respiratory distress.





Review of Systems





- Review of Systems


All systems: reviewed and no additional remarkable complaints except (as per HPI

)





Past Patient History





- Infectious Disease


Hx of Infectious Diseases: None





- Tetanus Immunizations


Tetanus Immunization: Up to Date





- Past Social History


Smoking Status: Former Smoker





- CARDIAC


Hx Cardiac Disorders: Yes (CARDIAC STNEET 2003)


Hx Hypercholesterolemia: Yes


Hx Hypertension: Yes


Hx Pacemaker: No





- PULMONARY


Hx Respiratory Disorders: Yes (SMOKED MARIJUANA H/O)


Hx Chronic Obstructive Pulmonary Disease (COPD): Yes





- NEUROLOGICAL


Hx Neurological Disorder: Yes





- HEENT


Hx HEENT Problems: Yes


Hx Blind: Yes (RIGHT EYE)


Hx Cataracts: Yes (LEFT EYE)


Hx Deafness: Yes (RIGHT EAR)





- RENAL


Hx Chronic Kidney Disease: No





- ENDOCRINE/METABOLIC


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


Hx Hypothyroidism: Yes





- HEMATOLOGICAL/ONCOLOGICAL


Hx Blood Disorders: No





- INTEGUMENTARY


Hx Dermatological Problems: No





- MUSCULOSKELETAL/RHEUMATOLOGICAL


Hx Musculoskeletal Disorders: No


Hx Falls: Yes





- GASTROINTESTINAL


Hx Gastrointestinal Disorders: No





- GENITOURINARY/GYNECOLOGICAL


Hx Genitourinary Disorders: No





- PSYCHIATRIC


Hx Depression: No


Hx Emotional Abuse: No


Hx Physical Abuse: No


Hx Substance Use: No





- SURGICAL HISTORY


Hx Surgeries: Yes (CARDIAC STENT 2003, R EYE SX, AP, T&A)





- ANESTHESIA


Hx Anesthesia: Yes


Hx Anesthesia Reactions: No


Hx Malignant Hyperthermia: No





Meds


Allergies/Adverse Reactions: 


 Allergies











Allergy/AdvReac Type Severity Reaction Status Date / Time


 


No Known Allergies Allergy   Verified 04/28/18 15:33














- Medications


Medications: 


 Current Medications





Albuterol/Ipratropium (Duoneb 3 Mg/0.5 Mg (3 Ml) Ud)  3 ml IH [id number] [facility]


   Last Admin: 04/30/18 07:18 Dose:  3 ml


Albuterol/Ipratropium (Duoneb 3 Mg/0.5 Mg (3 Ml) Ud)  3 ml IH [id number] PRN


   PRN Reason: Shortness of Breath


Atorvastatin Calcium (Lipitor)  20 mg PO DAILY [facility]


   Last Admin: 04/29/18 11:53 Dose:  20 mg


Brimonidine Tartrate (Alphagan P 0.15% Opht)  0 drop OS Q8H [facility]


   Last Admin: 04/30/18 05:35 Dose:  1 drop


Budesonide (Pulmicort Respules)  0.25 mg IH [id number] [facility]


   Last Admin: 04/30/18 07:18 Dose:  0.25 mg


Home Med (Home Med)  1 unit OS HS [facility]


   Last Admin: 04/29/18 22:05 Dose:  1 unit


Ceftriaxone Sodium (Rocephin 1 Gram Ivpb)  1 gm in 100 mls @ 100 mls/hr IVPB 

DAILY [facility]


   PRN Reason: Protocol


   Last Admin: 04/29/18 09:12 Dose:  100 mls/hr


Azithromycin (Zithromax 500mg In Ns)  500 mg in 250 mls @ 167 mls/hr IVPB DAILY 

[facility]


   PRN Reason: Protocol


   Last Admin: 04/29/18 09:14 Dose:  167 mls/hr


Dexmedetomidine HCl (Precedex 400mcg/100ml)  400 mcg in 100 mls @ 4.309 mls/hr 

IV .[id number] PRN; Protocol; 0.2 MCG/KG/HR


   PRN Reason: Agitation


   Last Titration: 04/29/18 09:00 Dose:  0 mcg/kg/hr, 0 mls/hr


Levothyroxine Sodium (Synthroid)  125 mcg PO 0600 [facility]


   Last Admin: 04/30/18 05:32 Dose:  Not Given


Lisinopril (Zestril)  20 mg PO DAILY [facility]


   Last Admin: 04/29/18 11:52 Dose:  20 mg


Methylprednisolone (Solu-Medrol)  40 mg IVP BID [facility]


Metoprolol Tartrate (Lopressor)  25 mg PO BID [facility]


   Last Admin: 04/29/18 17:02 Dose:  25 mg


Metoprolol Tartrate (Lopressor)  5 mg IVP Q6 PRN


   PRN Reason: systolic bp above 160


   Last Admin: 04/30/18 03:23 Dose:  5 mg


Pantoprazole Sodium (Protonix Inj)  40 mg IVP DAILY [facility]


   Last Admin: 04/29/18 09:12 Dose:  40 mg











Physical Exam





- Constitutional


Appears: Other (somewhat agitated but not violent, answers some questions but 

is hard of hearing)





- Head Exam


Head Exam: NORMAL INSPECTION





- ENT Exam


ENT Exam: Mucous Membranes Moist





- Neck Exam


Neck exam: Negative for: Lymphadenopathy, Meningismus





- Respiratory Exam


Respiratory Exam: Decreased Breath Sounds.  absent: Rales, Wheezes





- Cardiovascular Exam


Cardiovascular Exam: +S1, +S2





- GI/Abdominal Exam


GI & Abdominal Exam: Soft.  absent: Tenderness





Results





- Vital Signs


Recent Vital Signs: 


 Last Vital Signs











Temp  97.6 F   04/30/18 05:00


 


Pulse  112 H  04/30/18 08:40


 


Resp  26 H  04/30/18 08:40


 


BP  134/81   04/30/18 08:00


 


Pulse Ox  95   04/30/18 08:40














- Labs


Result Diagrams: 


 04/30/18 05:40





 04/30/18 05:40


Labs: 


 Laboratory Results - last 24 hr











  04/29/18 04/29/18 04/29/18





  08:00 11:39 15:26


 


WBC   


 


RBC   


 


Hgb   


 


Hct   


 


MCV   


 


MCH   


 


MCHC   


 


RDW   


 


Plt Count   


 


MPV   


 


Gran %   


 


Lymph % (Auto)   


 


Mono % (Auto)   


 


Eos % (Auto)   


 


Baso % (Auto)   


 


Gran #   


 


Lymph # (Auto)   


 


Mono # (Auto)   


 


Eos # (Auto)   


 


Baso # (Auto)   


 


Sodium   


 


Potassium   


 


Chloride   


 


Carbon Dioxide   


 


Anion Gap   


 


BUN   


 


Creatinine   


 


Est GFR ( Amer)   


 


Est GFR (Non-Af Amer)   


 


POC Glucose (mg/dL)   126 H  147 H


 


Random Glucose   


 


Calcium   


 


Total Bilirubin   


 


AST   


 


ALT   


 


Alkaline Phosphatase   


 


Total Protein   


 


Albumin   


 


Globulin   


 


Albumin/Globulin Ratio   


 


Procalcitonin  < 0.05 L  


 


Ur L.pneumophila Ag   














  04/29/18 04/29/18 04/29/18





  16:30 20:18 22:30


 


WBC   


 


RBC   


 


Hgb   


 


Hct   


 


MCV   


 


MCH   


 


MCHC   


 


RDW   


 


Plt Count   


 


MPV   


 


Gran %   


 


Lymph % (Auto)   


 


Mono % (Auto)   


 


Eos % (Auto)   


 


Baso % (Auto)   


 


Gran #   


 


Lymph # (Auto)   


 


Mono # (Auto)   


 


Eos # (Auto)   


 


Baso # (Auto)   


 


Sodium   


 


Potassium   


 


Chloride   


 


Carbon Dioxide   


 


Anion Gap   


 


BUN   


 


Creatinine   


 


Est GFR ( Amer)   


 


Est GFR (Non-Af Amer)   


 


POC Glucose (mg/dL)   145 H  128 H


 


Random Glucose   


 


Calcium   


 


Total Bilirubin   


 


AST   


 


ALT   


 


Alkaline Phosphatase   


 


Total Protein   


 


Albumin   


 


Globulin   


 


Albumin/Globulin Ratio   


 


Procalcitonin   


 


Ur L.pneumophila Ag  Negative  














  04/30/18 04/30/18 04/30/18





  04:04 05:40 05:40


 


WBC   26.0 H* D 


 


RBC   4.91 


 


Hgb   15.6 


 


Hct   45.8 


 


MCV   93.3 


 


MCH   31.8 


 


MCHC   34.1 


 


RDW   14.4 


 


Plt Count   267 


 


MPV   10.1 


 


Gran %   92.7 H 


 


Lymph % (Auto)   2.3 L 


 


Mono % (Auto)   5.0 


 


Eos % (Auto)   0.0 L 


 


Baso % (Auto)   0.0 


 


Gran #   24.12 H 


 


Lymph # (Auto)   0.6 L 


 


Mono # (Auto)   1.3 H 


 


Eos # (Auto)   0.0 


 


Baso # (Auto)   0.01 


 


Sodium    147


 


Potassium    4.4


 


Chloride    105


 


Carbon Dioxide    28


 


Anion Gap    18


 


BUN    22 H


 


Creatinine    0.9


 


Est GFR ( Amer)    > 60


 


Est GFR (Non-Af Amer)    > 60


 


POC Glucose (mg/dL)  120 H  


 


Random Glucose    130 H


 


Calcium    10.0


 


Total Bilirubin    0.6


 


AST    90 H D


 


ALT    42


 


Alkaline Phosphatase    76


 


Total Protein    7.7


 


Albumin    4.5


 


Globulin    3.2


 


Albumin/Globulin Ratio    1.4


 


Procalcitonin   


 


Ur L.pneumophila Ag   














Assessment & Plan





- Assessment and Plan (Free Text)


Plan: 





Assessment


Systemic Inflammatory response syndrome, with increasing leukocytosis, consider 

due to COPD exacerbation and use of steroids, on top of subdural hematoma in 

the head, R/O infection/sepsis, so far no source identified


COPD


deafness using hearing aides


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


history of alcoholism





Plan


patient is already on Rocephin and Zithromax for COPD exacerbation (day 3) - 

will repeat blood, urine cx, PCT, CXR; steroids are starting to be weaned - 

will monitor and trend WBC count


follow up further recommendations of Neurology


will monitor clinically

## 2018-04-30 NOTE — PN
DATE:  04/30/2018



SUBJECTIVE:  The patient is confused, unable to communicate with the

patient.



PHYSICAL EXAMINATION

VITAL SIGNS:  Temperature is 97.6, pulse of 112, blood pressure is 134/81,

O2 saturation 95%.

GENERAL:  The patient is lying in bed, flat, comfortable.

HEENT:  No oral lesion.  Anicteric sclerae.  Moist mucosa.

NECK:  No JVD, adenopathy, or thyromegaly.

CARDIOVASCULAR:  S1 and S2, regular.  No murmurs, rubs, or gallops.

LUNGS:  Clear to auscultation bilaterally.  No wheeze, rales, or rhonchi.

ABDOMEN:  Bowel sounds are positive, soft, nontender and nondistended.

EXTREMITIES:  No cyanosis, clubbing or edema.



ASSESSMENT:

1.  Bilateral subdural hematoma.

2.  Delirium.

3.  Hearing impairment.

4.  Chronic obstructive pulmonary disease.

5.  Dyslipidemia.

6.  Hypothyroidism.



PLAN:  The patient is currently on nebulizer treatment.  He is getting

eyedrops.  The patient is on Lipitor for dyslipidemia.  He is on

metoprolol.  The patient gets tachycardic, but this is most likely due to

the agitation he has.  I have started to wean off the patient on

Solu-Medrol, this was placed when the patient was in the ER.  The patient

is on Synthroid for hypothyroidism.  He is on Zestril for his hypertension.

An echo has been ordered and is pending.  The patient has elevated white

count, has been on steroids and this may be the cause.  The patient has

blood cultures that were negative.  I did speak to the patient's wife to

give her an update.  She is concerned about the patient's delirium.  I said

that we are trying to minimize the sedation use even though the patient is

agitated to prevent home to the patient.  He was trying to get out of bed

yesterday and _____ trying to manage him.





__________________________________________

Sebastian Tolentino MD



DD:  04/30/2018 9:14:38

DT:  04/30/2018 11:14:09

Job # [id number]

## 2018-04-30 NOTE — RAD
HISTORY:

rule out pneumonia  



COMPARISON:

04/28/2018 



FINDINGS:



LUNGS:

No active pulmonary disease.



PLEURA:

No significant pleural effusion identified, no pneumothorax apparent.



CARDIOVASCULAR:

Mild cardiomegaly and aortic tortuosity.  The patient is rotated to 

the right



OSSEOUS STRUCTURES:

No significant abnormalities.



VISUALIZED UPPER ABDOMEN:

Normal.



OTHER FINDINGS:

None.



IMPRESSION:

No active disease.

## 2018-05-01 LAB
ALBUMIN SERPL-MCNC: 4.5 G/DL (ref 3–4.8)
ALBUMIN/GLOB SERPL: 1.7 {RATIO} (ref 1.1–1.8)
ALT SERPL-CCNC: 56 U/L (ref 7–56)
AST SERPL-CCNC: 105 U/L (ref 17–59)
BASOPHILS # BLD AUTO: 0 K/MM3 (ref 0–2)
BASOPHILS NFR BLD: 0 % (ref 0–3)
BUN SERPL-MCNC: 29 MG/DL (ref 7–21)
CALCIUM SERPL-MCNC: 10 MG/DL (ref 8.4–10.5)
EOSINOPHIL # BLD: 0 10*3/UL (ref 0–0.7)
EOSINOPHIL NFR BLD: 0 % (ref 1.5–5)
ERYTHROCYTE [DISTWIDTH] IN BLOOD BY AUTOMATED COUNT: 14.6 % (ref 11.5–14.5)
GFR NON-AFRICAN AMERICAN: > 60
GRANULOCYTES # BLD: 17.66 10*3/UL (ref 1.4–6.5)
GRANULOCYTES NFR BLD: 87 % (ref 50–68)
HGB BLD-MCNC: 14.8 G/DL (ref 14–18)
LYMPHOCYTES # BLD: 2.2 10*3/UL (ref 1.2–3.4)
LYMPHOCYTES NFR BLD AUTO: 10.8 % (ref 22–35)
MCH RBC QN AUTO: 31.6 PG (ref 25–35)
MCHC RBC AUTO-ENTMCNC: 33.3 G/DL (ref 31–37)
MCV RBC AUTO: 94.9 FL (ref 80–105)
MONOCYTES # BLD AUTO: 0.5 10*3/UL (ref 0.1–0.6)
MONOCYTES NFR BLD: 2.2 % (ref 1–6)
PLATELET # BLD: 256 10^3/UL (ref 120–450)
PMV BLD AUTO: 10 FL (ref 7–11)
RBC # BLD AUTO: 4.68 10^6/UL (ref 3.5–6.1)
WBC # BLD AUTO: 20.3 10^3/UL (ref 4.5–11)

## 2018-05-01 RX ADMIN — BRIMONIDINE TARTRATE SCH DROP: 1.5 SOLUTION/ DROPS OPHTHALMIC at 14:14

## 2018-05-01 RX ADMIN — METOPROLOL TARTRATE PRN MG: 5 INJECTION INTRAVENOUS at 00:10

## 2018-05-01 RX ADMIN — IPRATROPIUM BROMIDE AND ALBUTEROL SULFATE PRN ML: .5; 3 SOLUTION RESPIRATORY (INHALATION) at 23:37

## 2018-05-01 RX ADMIN — IPRATROPIUM BROMIDE AND ALBUTEROL SULFATE PRN ML: .5; 3 SOLUTION RESPIRATORY (INHALATION) at 11:22

## 2018-05-01 RX ADMIN — IPRATROPIUM BROMIDE AND ALBUTEROL SULFATE SCH ML: .5; 3 SOLUTION RESPIRATORY (INHALATION) at 16:45

## 2018-05-01 RX ADMIN — METOPROLOL TARTRATE SCH MG: 5 INJECTION INTRAVENOUS at 18:01

## 2018-05-01 RX ADMIN — IPRATROPIUM BROMIDE AND ALBUTEROL SULFATE SCH: .5; 3 SOLUTION RESPIRATORY (INHALATION) at 02:06

## 2018-05-01 RX ADMIN — BRIMONIDINE TARTRATE SCH DROP: 1.5 SOLUTION/ DROPS OPHTHALMIC at 05:33

## 2018-05-01 RX ADMIN — IPRATROPIUM BROMIDE AND ALBUTEROL SULFATE SCH ML: .5; 3 SOLUTION RESPIRATORY (INHALATION) at 20:15

## 2018-05-01 RX ADMIN — BUDESONIDE SCH MG: 0.25 SUSPENSION RESPIRATORY (INHALATION) at 20:15

## 2018-05-01 RX ADMIN — IPRATROPIUM BROMIDE AND ALBUTEROL SULFATE SCH ML: .5; 3 SOLUTION RESPIRATORY (INHALATION) at 07:25

## 2018-05-01 RX ADMIN — AZITHROMYCIN DIHYDRATE SCH MLS/HR: 500 INJECTION, POWDER, LYOPHILIZED, FOR SOLUTION INTRAVENOUS at 10:39

## 2018-05-01 RX ADMIN — BUDESONIDE SCH MG: 0.25 SUSPENSION RESPIRATORY (INHALATION) at 07:26

## 2018-05-01 RX ADMIN — METOPROLOL TARTRATE SCH MG: 5 INJECTION INTRAVENOUS at 12:49

## 2018-05-01 RX ADMIN — BRIMONIDINE TARTRATE SCH DROP: 1.5 SOLUTION/ DROPS OPHTHALMIC at 22:51

## 2018-05-01 RX ADMIN — IPRATROPIUM BROMIDE AND ALBUTEROL SULFATE PRN ML: .5; 3 SOLUTION RESPIRATORY (INHALATION) at 04:48
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
## 2018-05-02 LAB
ALBUMIN SERPL-MCNC: 4.2 G/DL (ref 3–4.8)
ALBUMIN/GLOB SERPL: 1.6 {RATIO} (ref 1.1–1.8)
ALT SERPL-CCNC: 73 U/L (ref 7–56)
AST SERPL-CCNC: 111 U/L (ref 17–59)
BASOPHILS # BLD AUTO: 0.01 K/MM3 (ref 0–2)
BASOPHILS NFR BLD: 0.1 % (ref 0–3)
BUN SERPL-MCNC: 23 MG/DL (ref 7–21)
CALCIUM SERPL-MCNC: 9.1 MG/DL (ref 8.4–10.5)
EOSINOPHIL # BLD: 0.1 10*3/UL (ref 0–0.7)
EOSINOPHIL NFR BLD: 0.8 % (ref 1.5–5)
ERYTHROCYTE [DISTWIDTH] IN BLOOD BY AUTOMATED COUNT: 14.1 % (ref 11.5–14.5)
GFR NON-AFRICAN AMERICAN: > 60
GRANULOCYTES # BLD: 14.44 10*3/UL (ref 1.4–6.5)
GRANULOCYTES NFR BLD: 78 % (ref 50–68)
HDLC SERPL-MCNC: 57 MG/DL (ref 29–60)
HGB BLD-MCNC: 14.5 G/DL (ref 14–18)
LDLC SERPL-MCNC: 65 MG/DL (ref 0–129)
LYMPHOCYTES # BLD: 1.8 10*3/UL (ref 1.2–3.4)
LYMPHOCYTES NFR BLD AUTO: 9.9 % (ref 22–35)
MCH RBC QN AUTO: 31.9 PG (ref 25–35)
MCHC RBC AUTO-ENTMCNC: 34.3 G/DL (ref 31–37)
MCV RBC AUTO: 93.2 FL (ref 80–105)
MONOCYTES # BLD AUTO: 2.1 10*3/UL (ref 0.1–0.6)
MONOCYTES NFR BLD: 11.2 % (ref 1–6)
PLATELET # BLD: 223 10^3/UL (ref 120–450)
PMV BLD AUTO: 9.7 FL (ref 7–11)
RBC # BLD AUTO: 4.54 10^6/UL (ref 3.5–6.1)
WBC # BLD AUTO: 18.5 10^3/UL (ref 4.5–11)

## 2018-05-02 RX ADMIN — BRIMONIDINE TARTRATE SCH DROP: 1.5 SOLUTION/ DROPS OPHTHALMIC at 13:55

## 2018-05-02 RX ADMIN — IPRATROPIUM BROMIDE AND ALBUTEROL SULFATE SCH ML: .5; 3 SOLUTION RESPIRATORY (INHALATION) at 13:06

## 2018-05-02 RX ADMIN — METOPROLOL TARTRATE SCH MG: 5 INJECTION INTRAVENOUS at 00:15

## 2018-05-02 RX ADMIN — IPRATROPIUM BROMIDE AND ALBUTEROL SULFATE SCH: .5; 3 SOLUTION RESPIRATORY (INHALATION) at 01:03

## 2018-05-02 RX ADMIN — IPRATROPIUM BROMIDE AND ALBUTEROL SULFATE SCH ML: .5; 3 SOLUTION RESPIRATORY (INHALATION) at 19:44

## 2018-05-02 RX ADMIN — BRIMONIDINE TARTRATE SCH DROP: 1.5 SOLUTION/ DROPS OPHTHALMIC at 22:31

## 2018-05-02 RX ADMIN — BRIMONIDINE TARTRATE SCH DROP: 1.5 SOLUTION/ DROPS OPHTHALMIC at 05:17

## 2018-05-02 RX ADMIN — LEVOTHYROXINE SODIUM ANHYDROUS SCH MCG: 100 INJECTION, POWDER, LYOPHILIZED, FOR SOLUTION INTRAVENOUS at 11:16

## 2018-05-02 RX ADMIN — IPRATROPIUM BROMIDE AND ALBUTEROL SULFATE SCH ML: .5; 3 SOLUTION RESPIRATORY (INHALATION) at 07:07

## 2018-05-02 RX ADMIN — METOPROLOL TARTRATE SCH MG: 5 INJECTION INTRAVENOUS at 05:16

## 2018-05-02 RX ADMIN — BUDESONIDE SCH MG: 0.25 SUSPENSION RESPIRATORY (INHALATION) at 07:08

## 2018-05-02 RX ADMIN — METOPROLOL TARTRATE SCH: 5 INJECTION INTRAVENOUS at 21:19

## 2018-05-02 RX ADMIN — METOPROLOL TARTRATE SCH MG: 5 INJECTION INTRAVENOUS at 11:00

## 2018-05-02 RX ADMIN — AZITHROMYCIN DIHYDRATE SCH MLS/HR: 500 INJECTION, POWDER, LYOPHILIZED, FOR SOLUTION INTRAVENOUS at 10:56

## 2018-05-02 RX ADMIN — BUDESONIDE SCH MG: 0.25 SUSPENSION RESPIRATORY (INHALATION) at 19:44

## 2018-05-02 RX ADMIN — DEXTROSE AND SODIUM CHLORIDE SCH MLS/HR: 5; 450 INJECTION, SOLUTION INTRAVENOUS at 11:16
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
## 2018-05-02 NOTE — PN
DATE:  05/02/2018



SUBJECTIVE:  The patient is confused.



PHYSICAL EXAMINATION:

VITAL SIGNS:  Temperature is 98.2, pulse of 97, blood pressure is 130/91,

respirations 20.

GENERAL:  The patient is lying in bed, flat, comfortable.

HEENT:  No oral lesion.  Anicteric sclerae.  Moist mucosa.

NECK:  No JVD, adenopathy, or thyromegaly.

CARDIOVASCULAR:  S1 and S2, regular.  No murmurs, rubs, or gallops.

LUNGS:  Clear to auscultation bilaterally.  No wheeze, rales, or rhonchi.

ABDOMEN:  Bowel sounds are positive.  Soft, nontender and nondistended.

EXTREMITIES:  No cyanosis, clubbing or edema.



ASSESSMENT:

1.  Delirium.

2.  Bilateral subdural hematoma.

3.  Leukocytosis.

4.  Hypernatremia.

5.  Hearing impairment.

6.  Visual impairment.

7.  Chronic obstructive pulmonary disease.

8.  Dyslipidemia.

9.  Hypothyroidism.



PLAN:  I had reviewed the patient's psychiatric note from Dr. Joshua.  The

patient is currently on Ativan as needed, Geodon has been added as well as

Seroquel at nighttime.  The patient is on one-to-one.  The patient is also

on Lipitor for dyslipidemia.  The patient is on steroids, this is being

tapered slowly.  The patient is on Synthroid for hypothyroidism.  He is on

Zestril for hypertension.  The patient did have initial Gram-positive rods

in blood cultures.  Repeat blood cultures have been negative as well as

urine cultures.







__________________________________________

Sebastian Tolentino MD



DD:  05/02/2018 6:18:00

DT:  05/02/2018 8:37:13

Job # [id number]

## 2018-05-02 NOTE — CON
DATE:  04/30/2018



REASON FOR CONSULTATION:  PVCs, arrhythmia, hypertension, cardiac

evaluation.



BRIEF CLINICAL HISTORY:  An 82-year-old male admitted with headache, found

to be intracerebral bleed, obtunded, confused, n.p.o., [place] restraint,

having arrhythmia, PVCs and sinus tachycardia with hypertension.  Patient

is on _____.  He denies any chest pain, lying flat on the bed with a

2-point Posey restraint, not in apparent distress.



PAST MEDICAL HISTORY:  Significant for COPD, hypertension, hard of hearing,

hypothyroidism.



SOCIAL HISTORY:  Unable to obtain at this time, but in chart mentioned

ex-smoker and does not smoke anymore.



CURRENT MEDICATION:  In the medical record says the patient was taking

diclofenac, aspirin, levothyroxine, cholecalciferol, simvastatin,

omeprazole, metoprolol succinate 50 mg once a day, lisinopril 20 mg daily,

terazosin, _____.



REVIEW OF SYSTEMS:  As per HPI.



PHYSICAL EXAMINATION:

VITAL SIGNS:  Temperature afebrile, heart rate 113, blood pressure 165/83.

HEENT:  PERRLA, intact.

NECK:  Supple.  No carotid bruits or thyromegaly.

CHEST:  Clear to auscultation.

HEART:  S1 and S2 regular.

ABDOMEN:  Soft.

EXTREMITIES:  Clubbing and cyanosis negative.



LABORATORY DATA:  Blood workup shows WBC 20.3, hemoglobin 14.8, hematocrit

44.4, and platelet count 256.  Chemistry shows sodium 149, potassium 4.2,

chloride 105, carbon dioxide 29, anion gap of 19, BUN _____, creatinine 1. 

EKG showed normal sinus, no acute ST-T changes noted.



IMPRESSION:  Bilateral subdural hematoma, arrhythmia, hypertension, n.p.o.,

history of chronic obstructive pulmonary disease, history of

hypothyroidism, hyperlipidemia, altered mental status secondary to subdural

hematoma, hard of hearing.



RECOMMENDATION:  We will start IV Lopressor 5 mg every 6 hours, start

clonidine patch with IV hydralazine.  Since the patient is n.p.o., we will

get lipid profile, TSH, hemoglobin A1c.  We will get an echo to asses LV

function if not done recently.  As mentioned, admitting EKG on 04/28/2018

shows normal sinus, nonspecific ST-T changes.  We will follow with you.



Thank you, Dr. Tolentino for providing us the opportunity in taking care of

patient, Vasquez Martin.





__________________________________________

Rosalio Judge MD



DD:  05/01/2018 11:57:06

DT:  05/01/2018 13:17:31

Job # [id number]

## 2018-05-02 NOTE — PN
DATE:  05/02/2018



REASON FOR CONSULTATION AND FOLLOWUP:  PVCs, arrhythmia, hypertension,

cardiac evaluation, subdural hematoma, altered mental status.



SUBJECTIVE:  The patient is on 2-point soft Posey restraint, altered mental

status, not responding to verbal stimuli.



PHYSICAL EXAMINATION:

GENERAL:  Not in apparent distress, lying flat, being cleaned by the LPN. 

Examination as follows, altered mental status, not responding to verbal

stimuli, disoriented, confused, some times gets delirious.

VITAL SIGNS:  Temperature afebrile, heart rate 53, blood pressure 132/90.

HEENT:  PERRLA, intact.  Extraocular muscles intact.

NECK:  Supple.  No carotid bruits or thyromegaly.

CHEST:  Clear to auscultation.

HEART:  S1 and S2 regular.

ABDOMEN:  Soft.

EXTREMITIES:  Clubbing and cyanosis negative.



LABORATORY DATA:  Blood workup as follows:  WBC 18.5, hemoglobin 14.5,

hematocrit 42.3,  platelet count 223.  Chemistry shows sodium 140,

potassium 4, chloride 103, carbon dioxide 30, anion gap of 15, BUN 23,

creatinine 0.8.  Total bilirubin 1.4, magnesium 2.3.  TSH 5.61.



IMPRESSION:  Hypothyroidism, altered mental status, subdural hematoma,

hypertension, n.p.o. because of altered mental status, history of chronic

obstructive pulmonary disease, history of hypothyroidism, hyperlipidemia

and subdural hematoma, hard of hearing.



RECOMMENDATION:  We will continue IV Lopressor, continue IV hydralazine. 

Continue Clonidine patch. #2 Catapres.  We will put Levoxyl to IV.  The

patient at home was taking Levoxyl 125 mcg, but since the patient is

n.p.o., we will give IV Levoxyl until the patient get p.o.  I will

discontinue lisinopril because the patient has altered mental status and

cannot take the p.o. meds.  Continue IV Lopressor 5 mg every 6 hours,

Clonidine patch, Catapres #2 and p.r.n. hydralazine.  We will discontinue

also p.o. and will switch to 125 daily.  Continue supportive care oral.  We

will follow with you.  I will put SMA-7 and magnesium tomorrow.  Overall,

the patient's condition is critical.  Long prognosis guarded.  Discussed

with nursing staff taking care.  I will put IV fluid 40 mL KVO (keep vein

open) as long as the patient remains n.p.o.  I will put D5 half normal

saline 40 mL an hour until the patient remains n.p.o.



Thank you, Dr. Tolentino for providing us the opportunity in taking care of

patient, Vasquez Salazar.



__________________________________________

Rosalio Judge MD





DD:  05/02/2018 8:56:19

DT:  05/02/2018 10:39:57

Job # [id number]

## 2018-05-02 NOTE — CP.PCM.PN
Subjective





- Date & Time of Evaluation


Date of Evaluation: 05/01/18


Time of Evaluation: 10:40





- Subjective


Subjective: 





Comfortable, no fevers, not in distress, still with some confusion but a little 

better, no diarrhea, breathing better.





Objective





- Vital Signs/Intake and Output


Vital Signs (last 24 hours): 


 











Temp Pulse Resp BP Pulse Ox


 


 98.2 F   111 H  24   163/93 H  97 


 


 05/01/18 08:20  05/01/18 07:00  05/01/18 00:00  05/01/18 00:10  05/01/18 00:00








Intake and Output: 


 











 05/01/18 05/01/18





 06:59 18:59


 


Intake Total 150 


 


Balance 150 














- Medications


Medications: 


 Current Medications





Albuterol/Ipratropium (Duoneb 3 Mg/0.5 Mg (3 Ml) Ud)  3 ml IH [id number] [facility]


   Last Admin: 05/01/18 07:25 Dose:  3 ml


Albuterol/Ipratropium (Duoneb 3 Mg/0.5 Mg (3 Ml) Ud)  3 ml IH [id number] PRN


   PRN Reason: Shortness of Breath


   Last Admin: 05/01/18 04:48 Dose:  3 ml


Atorvastatin Calcium (Lipitor)  20 mg PO DAILY [facility]


   Last Admin: 04/30/18 10:35 Dose:  20 mg


Brimonidine Tartrate (Alphagan P 0.15% Opht)  0 drop OS Q8H [facility]


   Last Admin: 05/01/18 05:33 Dose:  1 drop


Budesonide (Pulmicort Respules)  0.25 mg IH [id number] [facility]


   Last Admin: 05/01/18 07:26 Dose:  0.25 mg


Home Med (Home Med)  1 unit OS [facility]


   Last Admin: 04/30/18 22:28 Dose:  1 unit


Home Med (Home Med)  1 unit PO [facility]


   Last Admin: 04/30/18 22:29 Dose:  1 unit


Azithromycin (Zithromax 500mg In Ns)  500 mg in 250 mls @ 167 mls/hr IVPB DAILY 

[facility]


   PRN Reason: Protocol


   Last Admin: 04/30/18 10:36 Dose:  167 mls/hr


Dexmedetomidine HCl (Precedex 400mcg/100ml)  400 mcg in 100 mls @ 4.309 mls/hr 

IV .[id number] PRN; Protocol; 0.2 MCG/KG/HR


   PRN Reason: Agitation


   Last Titration: 04/29/18 09:00 Dose:  0 mcg/kg/hr, 0 mls/hr


Dextrose (Dextrose 5% In Water 1000 Ml)  1,000 mls @ 100 mls/hr IV .Q10H [facility]


   Stop: 05/02/18 04:14


Levothyroxine Sodium (Synthroid)  125 mcg PO 0600 [facility]


   Last Admin: 05/01/18 05:33 Dose:  125 mcg


Lisinopril (Zestril)  20 mg PO DAILY [facility]


   Last Admin: 04/30/18 10:36 Dose:  20 mg


Lorazepam (Ativan)  1 mg IVP Q6H PRN; Protocol


   PRN Reason: Restlessness


   Last Admin: 05/01/18 04:57 Dose:  1 mg


Lorazepam (Ativan)  0.5 mg PO TID [facility]


   PRN Reason: Protocol


Methylprednisolone (Solu-Medrol)  40 mg IVP DAILY [facility]


Metoprolol Tartrate (Lopressor)  25 mg PO BID [facility]


   Last Admin: 04/30/18 17:47 Dose:  Not Given


Metoprolol Tartrate (Lopressor)  5 mg IVP Q6 PRN


   PRN Reason: systolic bp above 160


   Last Admin: 05/01/18 00:10 Dose:  5 mg


Pantoprazole Sodium (Protonix Inj)  40 mg IVP DAILY [facility]


   Last Admin: 04/30/18 10:36 Dose:  40 mg











- Labs


Labs: 


 





 05/01/18 05:50 





 05/01/18 05:50 





 











PT  13.3 SECONDS (9.4-12.5)  H  04/28/18  11:39    


 


INR  1.15  (0.93-1.08)  H  04/28/18  11:39    


 


APTT  32.6 Seconds (25.1-36.5)   04/28/18  11:39    














- Constitutional


Appears: Chronically Ill





- Head Exam


Head Exam: NORMAL INSPECTION





- ENT Exam


ENT Exam: Mucous Membranes Moist





- Respiratory Exam


Respiratory Exam: Decreased Breath Sounds





- Cardiovascular Exam


Cardiovascular Exam: +S1, +S2





- GI/Abdominal Exam


GI & Abdominal Exam: Soft.  absent: Tenderness





Assessment and Plan





- Assessment and Plan (Free Text)


Plan: 





Assessment


Systemic Inflammatory response syndrome, with increasing leukocytosis, consider 

due to COPD exacerbation and use of steroids, on top of subdural hematoma in 

the head, with evidence of infection/sepsis identified - Coryneacterium in 1 

out of 4 bottles of blood cx is a contaminant


COPD


deafness using hearing aides


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


history of alcoholism





Plan


continue to monitor off antibiotics since he is at risk for nosocomial 

infections

## 2018-05-03 LAB
ALBUMIN SERPL-MCNC: 3.9 G/DL (ref 3–4.8)
ALBUMIN/GLOB SERPL: 1.5 {RATIO} (ref 1.1–1.8)
ALT SERPL-CCNC: 99 U/L (ref 7–56)
AST SERPL-CCNC: 106 U/L (ref 17–59)
BASOPHILS # BLD AUTO: 0.01 K/MM3 (ref 0–2)
BASOPHILS NFR BLD: 0.1 % (ref 0–3)
BUN SERPL-MCNC: 18 MG/DL (ref 7–21)
CALCIUM SERPL-MCNC: 8.5 MG/DL (ref 8.4–10.5)
EOSINOPHIL # BLD: 0.5 10*3/UL (ref 0–0.7)
EOSINOPHIL NFR BLD: 3.9 % (ref 1.5–5)
ERYTHROCYTE [DISTWIDTH] IN BLOOD BY AUTOMATED COUNT: 13.9 % (ref 11.5–14.5)
GFR NON-AFRICAN AMERICAN: > 60
GRANULOCYTES # BLD: 10.78 10*3/UL (ref 1.4–6.5)
GRANULOCYTES NFR BLD: 77.5 % (ref 50–68)
HGB BLD-MCNC: 14.2 G/DL (ref 14–18)
LYMPHOCYTES # BLD: 1.3 10*3/UL (ref 1.2–3.4)
LYMPHOCYTES NFR BLD AUTO: 9.4 % (ref 22–35)
MCH RBC QN AUTO: 31.9 PG (ref 25–35)
MCHC RBC AUTO-ENTMCNC: 34.9 G/DL (ref 31–37)
MCV RBC AUTO: 91.5 FL (ref 80–105)
MONOCYTES # BLD AUTO: 1.3 10*3/UL (ref 0.1–0.6)
MONOCYTES NFR BLD: 9.1 % (ref 1–6)
PLATELET # BLD: 243 10^3/UL (ref 120–450)
PMV BLD AUTO: 10.4 FL (ref 7–11)
RBC # BLD AUTO: 4.45 10^6/UL (ref 3.5–6.1)
WBC # BLD AUTO: 13.9 10^3/UL (ref 4.5–11)

## 2018-05-03 RX ADMIN — DEXTROSE AND SODIUM CHLORIDE SCH MLS/HR: 5; 450 INJECTION, SOLUTION INTRAVENOUS at 12:00

## 2018-05-03 RX ADMIN — IPRATROPIUM BROMIDE AND ALBUTEROL SULFATE SCH ML: .5; 3 SOLUTION RESPIRATORY (INHALATION) at 02:37

## 2018-05-03 RX ADMIN — IPRATROPIUM BROMIDE AND ALBUTEROL SULFATE SCH ML: .5; 3 SOLUTION RESPIRATORY (INHALATION) at 19:38

## 2018-05-03 RX ADMIN — BUDESONIDE SCH MG: 0.25 SUSPENSION RESPIRATORY (INHALATION) at 08:01

## 2018-05-03 RX ADMIN — BRIMONIDINE TARTRATE SCH DROP: 1.5 SOLUTION/ DROPS OPHTHALMIC at 06:06

## 2018-05-03 RX ADMIN — METOPROLOL TARTRATE SCH MG: 5 INJECTION INTRAVENOUS at 01:27

## 2018-05-03 RX ADMIN — BUDESONIDE SCH MG: 0.25 SUSPENSION RESPIRATORY (INHALATION) at 19:38

## 2018-05-03 RX ADMIN — METOPROLOL TARTRATE SCH MG: 5 INJECTION INTRAVENOUS at 05:57

## 2018-05-03 RX ADMIN — METOPROLOL TARTRATE SCH MG: 5 INJECTION INTRAVENOUS at 12:03

## 2018-05-03 RX ADMIN — BRIMONIDINE TARTRATE SCH DROP: 1.5 SOLUTION/ DROPS OPHTHALMIC at 14:43

## 2018-05-03 RX ADMIN — IPRATROPIUM BROMIDE AND ALBUTEROL SULFATE SCH ML: .5; 3 SOLUTION RESPIRATORY (INHALATION) at 13:18

## 2018-05-03 RX ADMIN — IPRATROPIUM BROMIDE AND ALBUTEROL SULFATE SCH: .5; 3 SOLUTION RESPIRATORY (INHALATION) at 01:02

## 2018-05-03 RX ADMIN — IPRATROPIUM BROMIDE AND ALBUTEROL SULFATE PRN ML: .5; 3 SOLUTION RESPIRATORY (INHALATION) at 17:59

## 2018-05-03 RX ADMIN — BRIMONIDINE TARTRATE SCH DROP: 1.5 SOLUTION/ DROPS OPHTHALMIC at 21:38

## 2018-05-03 RX ADMIN — LEVOTHYROXINE SODIUM ANHYDROUS SCH MCG: 100 INJECTION, POWDER, LYOPHILIZED, FOR SOLUTION INTRAVENOUS at 10:20

## 2018-05-03 RX ADMIN — IPRATROPIUM BROMIDE AND ALBUTEROL SULFATE SCH ML: .5; 3 SOLUTION RESPIRATORY (INHALATION) at 08:01

## 2018-05-03 NOTE — PN
DATE:  05/03/2018



SUBJECTIVE:  The patient has no complaints of any chest pain, shortness of

breath, or headaches.



OBJECTIVE:

VITAL SIGNS:  Temperature is 98.1, pulse of 91, blood pressure 130/86,

respirations 20.

GENERAL:  The patient is lying in bed, flat, comfortable.

HEENT:  No oral lesion.  Anicteric sclerae.  Moist mucosa.

NECK:  No JVD, adenopathy, or thyromegaly.

CARDIOVASCULAR:  S1 and S2, regular.  No murmurs, rubs, or gallops.

LUNGS:  Clear to auscultation bilaterally.  No wheeze, rales, or rhonchi.

ABDOMEN:  Bowel sounds are positive, soft, nontender and nondistended.

EXTREMITIES:  No cyanosis, clubbing or edema.



ASSESSMENT:

1.  Delirium.

2.  Bilateral subdural hematoma.

3.  Leukocytosis, improving.

4.  Hyponatremia, improved.

5.  Hearing impairment.

6.  Visual impairment.

7.  Chronic obstructive pulmonary disease.

8.  Dyslipidemia.

9.  Hypothyroidism.



PLAN:  The patient is still confused.  His white count is improving.  He

had sodium that was elevated.  He was presently on IV fluids that has

improved his symptoms.  The patient is being followed by Psychiatry.  He

has been given Ativan.  He is not able to take orally.  He is on IV fluids.

The patient is on Geodon as needed.  He is receiving Lipitor, but he was

not able to take his Lipitor.  He is on Protonix IV.  The patient is on

one-to-one.  He continues to have confusion because of his delirium, this

is multifactorial.  He has hearing impairment, visual impairment as well as

subdural hematoma.  I have been speaking to the patient's wife to give her

an update.





__________________________________________

Sebastian Tolentino MD



DD:  05/03/2018 16:32:14

DT:  05/03/2018 17:34:48

Job # [id number]

## 2018-05-03 NOTE — PN
DATE:  05/03/2018



SUBJECTIVE:  The patient is in bed, in no acute distress, nontoxic.



PHYSICAL EXAMINATION:

VITAL SIGNS:  Temperature is 98, blood pressure is 130/80, respiratory rate

of 20, heart rate of 91.

HEENT:  Unremarkable.

NECK:  Supple.

LUNGS:  Have decreased breath sounds.

HEART:  Normal S1, S2.

ABDOMEN:  Soft, nontender.



LABORATORY DATA:  Reveals a white count of 13,900, hemoglobin of 14. 

Chemistries reveal a BUN of 18, creatinine of 0.8.  Urinalysis is noted. 

Toxicology is noted.  Urine for Legionella antigen is negative.



ASSESSMENT AND PLAN:  This is an 82-year-old female who was seen early this

morning in 262, bed 1 with systemic inflammatory response syndrome with

leukocytosis, chronic obstructive lung disease exacerbation, use of

steroids on top of subdural hematoma,  evidence of Corynebacterium in one

blood culture, most consistent with contamination.  Currently, off of

antibiotics and the patient is at risk for developing nosocomial

infections.







__________________________________________

Todd Gary MD



DD:  05/03/2018 18:38:38

DT:  05/03/2018 18:41:37

Job # [id number]

## 2018-05-03 NOTE — CARD
--------------- APPROVED REPORT --------------





EXAM: Two-dimensional and M-mode echocardiogram with Doppler and 

color Doppler.



INDICATION

Chest Pain LVFX



2D DIMENSIONS 

IVSd1.2   (0.7-1.1cm)LVDd4.3   (3.9-5.9cm)

PWd1.3   (0.7-1.1cm)LVDs3.0   (2.5-4.0cm)

FS (%) 31.2   %LVEF (%)59.2   (>50%)



M-Mode DIMENSIONS 

Aortic Root4.10   (2.2-3.7cm)Aortic Cusp Exc.1.00   (1.5-2.0cm)



Aortic Valve

AoV Peak Rzcjarxl306.0cm/Salvador Peak GR.6mmHgLVOT Peak 

Ysmzzjrf92.5cm/s

LVOT VTI16.20cm



Mitral Valve

E/A ratio0.0



TDI

E/Lateral E'0.0E/Medial E'0.0



Pulmonary Valve

PV Peak Cpdnhubv72.0cm/sPV Peak Grad.2mmHg



Tricuspid Valve

TR Peak Hghpbjvn634ne/sRAP RSCAHGVW79riDcPO Peak Gr.9mmHg

PZRZ15efWt



 LEFT VENTRICLE 

The left ventricle is normal size. There is mild concentric left 

ventricular hypertrophy. The left ventricular function is 

normal.EF-55-60% There is normal LV segmental wall motion. 

Transmitral Doppler flow pattern is Grade III-reversible restrictive 

diastolic dysfunction. No left ventricle thrombus noted on this 

study. There is no ventricular septal defect visualized. There is no 

left ventricular aneurysm. There is no mass noted in the left 

ventricle.



 RIGHT VENTRICLE 

The right ventricle is normal size. There is normal right ventricular 

wall thickness. The right ventricular systolic function is normal.



 ATRIA 

The left atrium is mildly dilated. The right atrium size is normal. 

The interatrial septum is intact with no evidence for an atrial 

septal defect.



 AORTIC VALVE 

The aortic valve is calcified and displays decreased opening. There 

is trace aortic regurgitation. Mild AS (or probably Mild to Moderate),

 TDS There is no aortic valvular vegetation.



 MITRAL VALVE 

The mitral valve is thickened but opens well. Mitral regurgitation is 

trace. There is no mitral valve stenosis. There is no evidence of 

mitral valve prolapse.



 TRICUSPID VALVE 

The tricuspid valve leaflets are thickened , but open well. There is 

trace tricuspid regurgitation.RVSP_19 mmof Hg. There is no tricuspid 

valve stenosis. There is no tricuspid valve prolapse or vegetation.



 PULMONIC VALVE 

The pulmonic valve is flail.



 GREAT VESSELS 

The aortic root is Borderline enlarged. The ascending aorta is 

Borderline dilated. The pulmonary artery is normal. The IVC is normal 

in size and collapses >50% with inspiration.



 PERICARDIAL EFFUSION 

There is no pleural effusion. There is no pericardial effusion.



<Conclusion>

The left ventricle is normal size.

There is mild concentric left ventricular hypertrophy.

The left ventricular function is normal.EF-55-60%

There is trace aortic regurgitation.

Mild AS (or probably Mild to Moderate), TDS

Mitral regurgitation is trace.

There is trace tricuspid regurgitation.RVSP_19 mmof Hg.

The aortic root is Borderline enlarged.

The ascending aorta is Borderline dilated.

The IVC is normal in size and collapses >50% with inspiration.

There is no pericardial effusion.

TDS and limited views could be obtained b/c of TDS and non 

cooperative pt.

AMS and  ICB ( subdural Hematoma).

No vegetation or thrombus noted.

## 2018-05-03 NOTE — PN
DATE:  05/03/2018



SUBJECTIVE:  In short, the patient was followed up today.  The patient

presented with some improvement with the presentation.  The patient was

less agitated or aggressive, much calmer to compare with the past couple of

days.  At the same time, the patient was refusing to take Seroquel at the

nighttime alternation with the patient's sedation.  Discussed with the

nursing staff.  The patient presented less anxious and less agitated.  This

writer reviewed notes.



This writer also reviewed vital signs.  Vital signs seem to be stable. 

Temperature 98.1, pulse is 91, blood pressure 138/96, respirations 20, and

oxygen saturation is 100.



MEDICATIONS:  Reviewed.  The patient is on DuoNeb; Lipitor; Pulmicort;

Catapres; dextrose; hydralazine; Synthroid; Ativan 1 mg IV push every 6

hours as needed, the patient so far gets only once a day dose; Ativan 0.5

mg IV push three times a day as scheduled.  We will add if needed further. 

The patient was on Seroquel, but was not able to swallow, it will be

discontinued.  Risperdal 0.5 mg liquid will be provided to the patient. 

The patient is on Geodon 10 mg every 8 hours as needed, last dose was today

at 11:55.  Altogether, the patient got 2 injections.



LABORATORY DATA:  Reviewed.  Most recently is from today.  WBC cells

trending down to 13.9 today.  Chemistry also reviewed.  AST and  and

99 respectively.  Microbiology showed Corynebacterium in blood.



MENTAL STATUS EXAMINATION:  The patient is hard of hearing, almost blind. 

The patient presented calmer to compare with the past 2 days.  There is no

option to have meaningful conversation because of the patient's condition.



IMPRESSION:  Most likely, the patient is in delirium stage, hyperactive.



PLAN:  Continue current management.  Continue current medications.  Ativan

should be continued.  Seroquel will be discontinued because the patient was

refused to take it and we will give Risperdal liquid form.  We will follow

up and advise accordingly.



Thank you very much for letting me participate in care of your patient.







__________________________________________

Josiane Oneill MD



DD:  05/03/2018 16:45:55

DT:  05/03/2018 16:50:56

[facility] # [id number]

## 2018-05-03 NOTE — CP.PCM.PN
Subjective





- Date & Time of Evaluation


Date of Evaluation: 05/03/18


Time of Evaluation: 06:30





- Subjective


Subjective: 





In bed, restless,confuse, on 1:1 sitter, no distress





Reason for consult and follow up: Cardiac evaluation, Arrythmia, hypertension,

subdural hematoma,post fall, altered mental status, hypercholesterolemia





Seen and examined by 





Objective





- Vital Signs/Intake and Output


Vital Signs (last 24 hours): 


 











Temp Pulse Resp BP Pulse Ox


 


 98.2 F   95 H  20   171/101 H  100 


 


 05/03/18 06:00  05/03/18 06:00  05/03/18 06:00  05/03/18 06:00  05/03/18 06:00








Intake and Output: 


 











 05/03/18 05/03/18





 06:59 18:59


 


Intake Total 560 


 


Balance 560 














- Medications


Medications: 


 Current Medications





Albuterol/Ipratropium (Duoneb 3 Mg/0.5 Mg (3 Ml) Ud)  3 ml IH [id number] [facility]


   Last Admin: 05/03/18 02:37 Dose:  3 ml


Albuterol/Ipratropium (Duoneb 3 Mg/0.5 Mg (3 Ml) Ud)  3 ml IH [id number] PRN


   PRN Reason: Shortness of Breath


   Last Admin: 05/01/18 23:37 Dose:  3 ml


Atorvastatin Calcium (Lipitor)  20 mg PO DAILY [facility]


   Last Admin: 05/02/18 11:18 Dose:  Not Given


Brimonidine Tartrate (Alphagan P 0.15% Opht)  0 drop OS Q8H [facility]


   Last Admin: 05/03/18 06:06 Dose:  1 drop


Budesonide (Pulmicort Respules)  0.25 mg IH [id number] [facility]


   Last Admin: 05/02/18 19:44 Dose:  0.25 mg


Clonidine HCl (Catapres-Tts2 0.2 Mg/24 Hr)  1 patch TD Q7D@1000 [facility]


   Last Admin: 05/01/18 12:48 Dose:  1 patch


Home Med (Home Med)  1 unit OS [facility]


   Last Admin: 05/02/18 22:31 Dose:  1 unit


Home Med (Home Med)  1 unit PO [facility]


   Last Admin: 05/02/18 22:32 Dose:  Not Given


Hydralazine HCl (Apresoline)  10 mg IVP Q6 PRN


   PRN Reason: for SBP>160


   Last Admin: 05/03/18 05:54 Dose:  10 mg


Dextrose/Sodium Chloride (Dextrose 5%/0.45% Ns 1000 Ml)  1,000 mls @ 40 mls/hr 

IV .Q24H [facility]


   Last Admin: 05/02/18 11:16 Dose:  40 mls/hr


Levothyroxine Sodium (Synthroid)  125 mcg IVP DAILY [facility]


   Last Admin: 05/02/18 11:16 Dose:  125 mcg


Lorazepam (Ativan)  1 mg IVP Q6H PRN; Protocol


   PRN Reason: restlessness and agitation


   Last Admin: 05/03/18 01:26 Dose:  1 mg


Lorazepam (Ativan)  0.5 mg IVP TID [facility]


   PRN Reason: Protocol


   Last Admin: 05/02/18 19:20 Dose:  0.5 mg


Metoprolol Tartrate (Lopressor)  5 mg IVP Q6H [facility]


   Last Admin: 05/03/18 05:57 Dose:  5 mg


Pantoprazole Sodium (Protonix Inj)  40 mg IVP DAILY [facility]


   Last Admin: 05/02/18 10:58 Dose:  40 mg


Quetiapine Fumarate (Seroquel)  12.5 mg PO HS [facility]


   PRN Reason: Protocol


   Last Admin: 05/02/18 22:33 Dose:  Not Given


Ziprasidone (Geodon Inj)  10 mg IM Q8H PRN; Protocol


   PRN Reason: agitation/psychosis


   Last Admin: 05/03/18 04:41 Dose:  10 mg











- Labs


Labs: 


 





 05/02/18 07:50 





 05/02/18 07:50 





 











PT  13.3 SECONDS (9.4-12.5)  H  04/28/18  11:39    


 


INR  1.15  (0.93-1.08)  H  04/28/18  11:39    


 


APTT  32.6 Seconds (25.1-36.5)   04/28/18  11:39    














- Constitutional


Appears: No Acute Distress





- Head Exam


Head Exam: NORMOCEPHALIC





- ENT Exam


ENT Exam: Mucous Membranes Dry





- Respiratory Exam


Respiratory Exam: Decreased Breath Sounds, NORMAL BREATHING PATTERN





- Cardiovascular Exam


Cardiovascular Exam: +S1, +S2


Additional comments: 





on telemetry NSR 70's





- GI/Abdominal Exam


GI & Abdominal Exam: Soft, Normal Bowel Sounds





- Psychiatric Exam


Psychiatric exam: Agitated


Additional comments: 





restless





- Skin


Skin Exam: Intact, Normal Color, Warm





Assessment and Plan





- Assessment and Plan (Free Text)


Assessment: 


A 82 year old who came into the ER due to complaints of shortness of breath,

wheezing,headache. Wife gave him tylenol with no relief and seemed to be 

confuse. History of hypertension, hypercholesterolemia, hypothyroidism,COPD, 

deafness, history of fall 2 weeks ago prior to admission. CT of head showed 

small bilateral subdural hematoma.




















Plan: 


Patient very confuse


Unable to take orally


All medication converted to IV


IV hydration until able to tolerate orally


On Catapres patch daily,Hydralazine 10 mg IV daily, Lopressor 5 mg IV daily,


  Synthroid 125mcg IVP daily and Ativan IV PRN


No evidence of cardiac ischemia


Telemetry NSR-70's


Continue current medications


Continue current treatment


Will follow up








Plan and treatment discussed with Dr. Judge

## 2018-05-04 LAB
BASOPHILS # BLD AUTO: 0.01 K/MM3 (ref 0–2)
BASOPHILS NFR BLD: 0.1 % (ref 0–3)
BUN SERPL-MCNC: 18 MG/DL (ref 7–21)
CALCIUM SERPL-MCNC: 8.6 MG/DL (ref 8.4–10.5)
EOSINOPHIL # BLD: 0.7 10*3/UL (ref 0–0.7)
EOSINOPHIL NFR BLD: 5.2 % (ref 1.5–5)
ERYTHROCYTE [DISTWIDTH] IN BLOOD BY AUTOMATED COUNT: 13.9 % (ref 11.5–14.5)
GFR NON-AFRICAN AMERICAN: > 60
GRANULOCYTES # BLD: 10.02 10*3/UL (ref 1.4–6.5)
GRANULOCYTES NFR BLD: 77.9 % (ref 50–68)
HGB BLD-MCNC: 11.6 G/DL (ref 14–18)
LYMPHOCYTES # BLD: 1.1 10*3/UL (ref 1.2–3.4)
LYMPHOCYTES NFR BLD AUTO: 8.8 % (ref 22–35)
MCH RBC QN AUTO: 29.1 PG (ref 25–35)
MCHC RBC AUTO-ENTMCNC: 31.7 G/DL (ref 31–37)
MCV RBC AUTO: 92 FL (ref 80–105)
MONOCYTES # BLD AUTO: 1 10*3/UL (ref 0.1–0.6)
MONOCYTES NFR BLD: 8 % (ref 1–6)
PLATELET # BLD: 194 10^3/UL (ref 120–450)
PMV BLD AUTO: 10.4 FL (ref 7–11)
RBC # BLD AUTO: 3.98 10^6/UL (ref 3.5–6.1)
WBC # BLD AUTO: 12.9 10^3/UL (ref 4.5–11)

## 2018-05-04 RX ADMIN — BRIMONIDINE TARTRATE SCH DROP: 1.5 SOLUTION/ DROPS OPHTHALMIC at 06:06

## 2018-05-04 RX ADMIN — BRIMONIDINE TARTRATE SCH DROP: 1.5 SOLUTION/ DROPS OPHTHALMIC at 21:49

## 2018-05-04 RX ADMIN — LEVOTHYROXINE SODIUM ANHYDROUS SCH MCG: 100 INJECTION, POWDER, LYOPHILIZED, FOR SOLUTION INTRAVENOUS at 09:48

## 2018-05-04 RX ADMIN — BRIMONIDINE TARTRATE SCH DROP: 1.5 SOLUTION/ DROPS OPHTHALMIC at 15:03

## 2018-05-04 RX ADMIN — DEXTROSE AND SODIUM CHLORIDE SCH MLS/HR: 5; 450 INJECTION, SOLUTION INTRAVENOUS at 10:36

## 2018-05-04 RX ADMIN — IPRATROPIUM BROMIDE AND ALBUTEROL SULFATE SCH ML: .5; 3 SOLUTION RESPIRATORY (INHALATION) at 20:50

## 2018-05-04 RX ADMIN — IPRATROPIUM BROMIDE AND ALBUTEROL SULFATE SCH ML: .5; 3 SOLUTION RESPIRATORY (INHALATION) at 01:08

## 2018-05-04 RX ADMIN — BUDESONIDE SCH MG: 0.25 SUSPENSION RESPIRATORY (INHALATION) at 20:51

## 2018-05-04 RX ADMIN — IPRATROPIUM BROMIDE AND ALBUTEROL SULFATE SCH ML: .5; 3 SOLUTION RESPIRATORY (INHALATION) at 07:48

## 2018-05-04 RX ADMIN — IPRATROPIUM BROMIDE AND ALBUTEROL SULFATE SCH ML: .5; 3 SOLUTION RESPIRATORY (INHALATION) at 13:34

## 2018-05-04 RX ADMIN — IPRATROPIUM BROMIDE AND ALBUTEROL SULFATE PRN ML: .5; 3 SOLUTION RESPIRATORY (INHALATION) at 18:45

## 2018-05-04 RX ADMIN — BUDESONIDE SCH MG: 0.25 SUSPENSION RESPIRATORY (INHALATION) at 07:47

## 2018-05-04 RX ADMIN — IPRATROPIUM BROMIDE AND ALBUTEROL SULFATE PRN ML: .5; 3 SOLUTION RESPIRATORY (INHALATION) at 10:36

## 2018-05-04 NOTE — PN
DATE:  05/04/2018



SUBJECTIVE:  Patient is in bed, in no acute distress, nontoxic.



OBJECTIVE:

VITAL SIGNS:  On exam, temperature is 98, blood pressure is 130/80,

respiratory rate 20, heart rate of 91.

HEENT:  Examination is unremarkable.

NECK:  Supple.

LUNGS:  Have decreased breath sounds.

HEART:  Normal S1, S2.

ABDOMEN:  Soft, nontender.



DATA:  Laboratory examination reveals the patient's white count is down to

12,900, hemoglobin of 11 and platelets of 194.  Coagulation is noted and

BUN of 18, creatinine of 0.8.  LFTs are noted.  Procalcitonin is negative

x2.  Microbiology reveals one blood cultures is positive for

corynebacterium.  Repeat blood cultures are negative.  Urine cultures are

negative.  Review of orders reveal the patient to be off antibiotics.



ASSESSMENT AND PLAN:  This is an 82-year-old male seen earlier this morning

in room 372, bed 1 with systemic inflammatory response syndrome,

leukocytosis, chronic obstructive lung disease _____ subdural hematoma with

blunt blood culture positive for corynebacterium, most consistent with a

contamination.  Currently, the patient is off of antibiotics, afebrile and

leukocytosis is down to 12,900 as of this morning and improving.  The

patient is at risk for developing nosocomial infections and long-term

prognosis is poor.







__________________________________________

Todd Gary MD



DD:  05/04/2018 8:51:45

DT:  05/04/2018 8:54:17

Job # [id number]

## 2018-05-04 NOTE — CP.PCM.PN
Subjective





- Date & Time of Evaluation


Date of Evaluation: 05/04/18


Time of Evaluation: 06:40





- Subjective


Subjective: 





In bed, calm, confuse,  no distress, near nurses station





Reason for consult and follow up: Cardiac evaluation, Arrythmia, hypertension,

subdural hematoma,post fall, altered mental status, hypercholesterolemia





Seen and examined by me and Dr. Judge








Objective





- Vital Signs/Intake and Output


Vital Signs (last 24 hours): 


 











Temp Pulse Resp BP Pulse Ox


 


 98.1 F   91 H  20   138/86   100 


 


 05/03/18 12:00  05/03/18 12:03  05/03/18 12:00  05/03/18 12:03  05/03/18 06:00








Intake and Output: 


 











 05/04/18 05/04/18





 06:59 18:59


 


Intake Total 500 


 


Output Total 375 


 


Balance 125 














- Medications


Medications: 


 Current Medications





Albuterol/Ipratropium (Duoneb 3 Mg/0.5 Mg (3 Ml) Ud)  3 ml IH [id number] Formerly [facility]


   Last Admin: 05/04/18 01:08 Dose:  3 ml


Albuterol/Ipratropium (Duoneb 3 Mg/0.5 Mg (3 Ml) Ud)  3 ml IH [id number] PRN


   PRN Reason: Shortness of Breath


   Last Admin: 05/03/18 17:59 Dose:  3 ml


Atorvastatin Calcium (Lipitor)  20 mg PO DAILY [facility]


   Last Admin: 05/03/18 10:14 Dose:  Not Given


Brimonidine Tartrate (Alphagan P 0.15% Opht)  0 drop OS Q8H Formerly [facility]


   Last Admin: 05/04/18 06:06 Dose:  1 drop


Budesonide (Pulmicort Respules)  0.25 mg IH [id number] Former[facility]


   Last Admin: 05/03/18 19:38 Dose:  0.25 mg


Clonidine HCl (Catapres-Tts2 0.2 Mg/24 Hr)  1 patch TD Q7D@1000 [facility]


   Last Admin: 05/01/18 12:48 Dose:  1 patch


Home Med (Home Med)  1 unit OS [facility]


   Last Admin: 05/03/18 21:39 Dose:  1 unit


Home Med (Home Med)  1 unit PO [facility]


   Last Admin: 05/03/18 22:00 Dose:  Not Given


Hydralazine HCl (Apresoline)  10 mg IVP Q6 PRN


   PRN Reason: for SBP>160


   Last Admin: 05/03/18 05:54 Dose:  10 mg


Dextrose/Sodium Chloride (Dextrose 5%/0.45% Ns 1000 Ml)  1,000 mls @ 40 mls/hr 

IV .Q24H [facility]


   Last Admin: 05/03/18 12:00 Dose:  40 mls/hr


Levothyroxine Sodium (Synthroid)  125 mcg IVP DAILY [facility]


   Last Admin: 05/03/18 10:20 Dose:  125 mcg


Lorazepam (Ativan)  1 mg IVP Q6H PRN; Protocol


   PRN Reason: restlessness and agitation


   Last Admin: 05/04/18 00:52 Dose:  1 mg


Lorazepam (Ativan)  0.5 mg IVP TID [facility]


   PRN Reason: Protocol


   Last Admin: 05/03/18 17:52 Dose:  Not Given


Pantoprazole Sodium (Protonix Inj)  40 mg IVP DAILY [facility]


   Last Admin: 05/03/18 10:21 Dose:  40 mg


Risperidone (Risperdal Oral Soln)  0.5 mg PO HS [facility]


   PRN Reason: Protocol


   Last Admin: 05/03/18 21:41 Dose:  0.5 ml


Ziprasidone (Geodon Inj)  10 mg IM Q8H PRN; Protocol


   PRN Reason: agitation/psychosis


   Last Admin: 05/03/18 22:09 Dose:  10 mg











- Labs


Labs: 


 





 05/03/18 07:50 





 05/03/18 07:50 





 











PT  13.3 SECONDS (9.4-12.5)  H  04/28/18  11:39    


 


INR  1.15  (0.93-1.08)  H  04/28/18  11:39    


 


APTT  32.6 Seconds (25.1-36.5)   04/28/18  11:39    














- Constitutional


Appears: No Acute Distress





- Head Exam


Head Exam: NORMOCEPHALIC





- ENT Exam


ENT Exam: Mucous Membranes Dry





- Respiratory Exam


Respiratory Exam: Decreased Breath Sounds, Rhonchi, NORMAL BREATHING PATTERN





- Cardiovascular Exam


Cardiovascular Exam: +S1, +S2





- GI/Abdominal Exam


GI & Abdominal Exam: Soft, Normal Bowel Sounds


Additional comments: 





NPO





-  Exam


Additional comments: 





incontinent, [place] catheter





- Extremities Exam


Extremities Exam: Normal Capillary Refill





- Neurological Exam


Additional comments: 





lethargic, confused





- Psychiatric Exam


Additional comments: 





restless





- Skin


Skin Exam: Intact, Normal Color, Warm





Assessment and Plan





- Assessment and Plan (Free Text)


Assessment: 





A 82 year old who came into the ER due to complaints of shortness of breath,

wheezing,headache. Wife gave him tylenol with no relief and seemed to be 

confuse. History of hypertension, hypercholesterolemia, hypothyroidism,COPD, 

deafness, history of fall 2 weeks ago prior to admission. CT of head showed 

small bilateral subdural hematoma.























Plan: 





Echo done yesterday-LVEF 55-60%, Mild to moderate aortic stenosis, 


trace aortic and  mitral regurgitation.


Patient room near nurses station


1:1 sitter discontinued


Fall precaution


Patient lethargic and confuse, less agitated, calm at times


Kept NPO,Unable to take orally


All medication converted to IV


IV hydration until able to tolerate orally


On Catapres patch daily,Hydralazine 10 mg IV daily, Lopressor 5 mg IV daily,


  Synthroid 125mcg IVP daily and Ativan IV PRN


No evidence of cardiac ischemia


Continue current medications


Continue current treatment





Will follow up








Plan and treatment discussed with Dr. Judge

## 2018-05-04 NOTE — PN
DATE:  05/04/2018



SUBJECTIVE:  The patient is confused.



PHYSICAL EXAMINATION

VITAL SIGNS:  Temperature is 98.1, pulse of 91, blood pressure is 138/86,

respirations 20.

GENERAL:  The patient is lying in bed, flat, comfortable.

HEENT:  No oral lesion.  Anicteric sclerae.  Moist mucosa.

NECK:  No JVD, adenopathy, or thyromegaly.

CARDIOVASCULAR:  S1 and S2, regular.  No murmurs, rubs, or gallops.

LUNGS:  Clear to auscultation bilaterally.  No wheeze, rales, or rhonchi.

ABDOMEN:  Bowel sounds are positive, soft, nontender and nondistended.

EXTREMITIES:  No cyanosis, clubbing or edema.



LABORATORY DATA:  White count 13.9, hemoglobin 14, creatinine 0.8.



Echocardiogram shows LV function is normal with EF of 55%-60%.



ASSESSMENT:

1.  Delirium.

2.  Bilateral subdural hematoma.

3.  Hypernatremia, resolved.

4.  Hearing impairment.

5.  Visual impairment.

6.  Chronic obstructive pulmonary disease.

7.  Dyslipidemia.

8.  Hypothyroidism.



PLAN:  The patient is currently comfortable.  He is on Clonidine for his

blood pressure.  The patient is on IV fluids.  He is receiving Geodon as

needed.  He is receiving IV Protonix.  He has difficulty in taking oral

meds _____ because of his confusion.  He is being followed by Psychiatry,

Cardiology, Neurology.  I appreciate their input.  The patient will most

likely need subacute rehab once he has improvement of his mental status.



 

__________________________________________

Sebastian Tolentino MD



DD:  05/04/2018 6:03:36

DT:  05/04/2018 7:57:55

Job # [id number]

## 2018-05-04 NOTE — PN
DATE:  05/04/2018



SUBJECTIVE:  In short, the patient is 82 years old  male with

multiple medical issues including delirium, bilateral subdural hematoma,

hypernatremia, hearing impairment, visual impairment, COPD, dyslipidemia,

hypothyroidism.  The patient is currently in delirium stage, hyperactive. 

This writer was involved because of the p.r.n. medication and medication to

calm the patient down.  The patient had good response on IV push of Ativan,

but still has periods of agitation and trying to climb off the bed.  This

writer will increase the dose of Ativan to 1 mg IV push three times a day

schedule as well as Risperdal 0.5 mg twice a day, will be increased today.



VITAL SIGNS:  Reviewed. Temperature 99.1, pulse is 86, blood pressure

161/104, respiration 18, oxygen saturation is 99.



MEDICATIONS:  Reviewed.  The patient is on DuoNeb, Lipitor, Alphagan,

Pulmicort, Catapres, dextrose, hydralazine, Synthroid, Ativan will be given

as 1 mg three times a day IV push schedule, Protonix, Risperdal will be

increased to 0.5 mg twice a day liquid.  The patient also is on Geodon 10

mg IM every 8 hours as needed for agitation and aggression.



LABORATORY DATA:  WBC cells trending down to 0.9, hemoglobin and hematocrit

11.6 and 36.6.  Coagulation reviewed, blood reviewed.  Chemistry reviewed. 

AST and ALT are still elevated at 106 and 99 respectively.



MENTAL STATUS EXAMINATION:  It is not an option to have meaningful

conversation.  The patient is very restless, agitated, hard-of-hearing and

hard of visually impaired.  The patient obviously not comfortable,

restless.



IMPRESSION:  Hyperactive delirium due to multiple medical issues.  Please

see medical team notes for more detailed information.



PLAN:  This writer increased the dose of Ativan 1 mg three times a day

schedule.  Also this writer will increase the dose of Risperdal 0.5 mg

twice a day for psychotic symptoms and restlessness.  Continue current

management.  Continue current medications.  The patient will be seen by Dr. Amador over the weekend.



Thank you very much for letting me participate in care of your patient.





__________________________________________

Josiane Oneill MD



DD:  05/04/2018 16:40:50

DT:  05/04/2018 16:45:06

Job # [id number]

## 2018-05-05 RX ADMIN — IPRATROPIUM BROMIDE AND ALBUTEROL SULFATE SCH ML: .5; 3 SOLUTION RESPIRATORY (INHALATION) at 14:00

## 2018-05-05 RX ADMIN — DEXTROSE AND SODIUM CHLORIDE SCH MLS/HR: 5; 450 INJECTION, SOLUTION INTRAVENOUS at 09:44

## 2018-05-05 RX ADMIN — IPRATROPIUM BROMIDE AND ALBUTEROL SULFATE SCH ML: .5; 3 SOLUTION RESPIRATORY (INHALATION) at 02:26

## 2018-05-05 RX ADMIN — IPRATROPIUM BROMIDE AND ALBUTEROL SULFATE SCH ML: .5; 3 SOLUTION RESPIRATORY (INHALATION) at 20:19

## 2018-05-05 RX ADMIN — IPRATROPIUM BROMIDE AND ALBUTEROL SULFATE SCH ML: .5; 3 SOLUTION RESPIRATORY (INHALATION) at 07:30

## 2018-05-05 RX ADMIN — BRIMONIDINE TARTRATE SCH DROP: 1.5 SOLUTION/ DROPS OPHTHALMIC at 15:08

## 2018-05-05 RX ADMIN — BRIMONIDINE TARTRATE SCH DROP: 1.5 SOLUTION/ DROPS OPHTHALMIC at 05:37

## 2018-05-05 RX ADMIN — BUDESONIDE SCH MG: 0.25 SUSPENSION RESPIRATORY (INHALATION) at 07:32

## 2018-05-05 RX ADMIN — LEVOTHYROXINE SODIUM ANHYDROUS SCH MCG: 100 INJECTION, POWDER, LYOPHILIZED, FOR SOLUTION INTRAVENOUS at 09:14

## 2018-05-05 RX ADMIN — BUDESONIDE SCH MG: 0.25 SUSPENSION RESPIRATORY (INHALATION) at 20:20

## 2018-05-05 RX ADMIN — BRIMONIDINE TARTRATE SCH DROP: 1.5 SOLUTION/ DROPS OPHTHALMIC at 21:43

## 2018-05-05 NOTE — CP.PCM.PN
Subjective





- Date & Time of Evaluation


Date of Evaluation: 05/05/18


Time of Evaluation: 06:40





- Subjective


Subjective: 





In bed, calm, no distress, near nurses station,as per RN agitated last night 

and given Ativan





Reason for consult and follow up: Cardiac evaluation, Arrythmia, hypertension,

subdural hematoma,post fall, altered mental status, hypercholesterolemia





Seen and examined by me and Dr. Meehan





Objective





- Vital Signs/Intake and Output


Vital Signs (last 24 hours): 


 











Temp Pulse Resp BP Pulse Ox


 


 98 F   88   20   123/69   99 


 


 05/04/18 18:00  05/04/18 18:00  05/04/18 18:00  05/04/18 18:00  05/04/18 18:00








Intake and Output: 


 











 05/05/18 05/05/18





 06:59 18:59


 


Intake Total 480 


 


Balance 480 














- Medications


Medications: 


 Current Medications





Albuterol/Ipratropium (Duoneb 3 Mg/0.5 Mg (3 Ml) Ud)  3 ml IH [id number] [facility]


   Last Admin: 05/05/18 07:30 Dose:  3 ml


Albuterol/Ipratropium (Duoneb 3 Mg/0.5 Mg (3 Ml) Ud)  3 ml IH [id number] PRN


   PRN Reason: Shortness of Breath


   Last Admin: 05/04/18 18:45 Dose:  3 ml


Atorvastatin Calcium (Lipitor)  20 mg PO DAILY [facility]


   Last Admin: 05/04/18 10:01 Dose:  Not Given


Brimonidine Tartrate (Alphagan P 0.15% Opht)  0 drop OS Q8H [facility]


   Last Admin: 05/05/18 05:37 Dose:  1 drop


Budesonide (Pulmicort Respules)  0.25 mg IH [id number] [facility]


   Last Admin: 05/05/18 07:32 Dose:  0.25 mg


Clonidine HCl (Catapres-Tts2 0.2 Mg/24 Hr)  1 patch TD Q7D@1000 [facility]


   Last Admin: 05/01/18 12:48 Dose:  1 patch


Home Med (Home Med)  1 unit OS [facility]


   Last Admin: 05/04/18 21:51 Dose:  1 unit


Home Med (Home Med)  1 unit PO [facility]


   Last Admin: 05/04/18 22:00 Dose:  Not Given


Hydralazine HCl (Apresoline)  10 mg IVP Q6 PRN


   PRN Reason: for SBP>160


   Last Admin: 05/04/18 09:05 Dose:  10 mg


Dextrose/Sodium Chloride (Dextrose 5%/0.45% Ns 1000 Ml)  1,000 mls @ 40 mls/hr 

IV .Q24H [facility]


   Last Admin: 05/04/18 10:36 Dose:  40 mls/hr


Levothyroxine Sodium (Synthroid)  125 mcg IVP DAILY [facility]


   Last Admin: 05/04/18 09:48 Dose:  125 mcg


Lorazepam (Ativan)  1 mg IVP Q6H PRN; Protocol


   PRN Reason: restlessness and agitation


   Last Admin: 05/05/18 03:29 Dose:  1 mg


Lorazepam (Ativan)  1 mg IVP TID [facility]


   PRN Reason: Protocol


   Last Admin: 05/04/18 18:32 Dose:  1 mg


Pantoprazole Sodium (Protonix Inj)  40 mg IVP DAILY [facility]


   Last Admin: 05/04/18 09:48 Dose:  40 mg


Risperidone (Risperdal Oral Soln)  0.5 mg PO AMHS [facility]


   PRN Reason: Protocol


   Last Admin: 05/04/18 22:00 Dose:  Not Given


Ziprasidone (Geodon Inj)  10 mg IM Q8H PRN; Protocol


   PRN Reason: agitation/psychosis


   Last Admin: 05/04/18 19:50 Dose:  10 mg











- Labs


Labs: 


 





 05/04/18 06:00 





 05/04/18 06:00 





 











PT  13.3 SECONDS (9.4-12.5)  H  04/28/18  11:39    


 


INR  1.15  (0.93-1.08)  H  04/28/18  11:39    


 


APTT  32.6 Seconds (25.1-36.5)   04/28/18  11:39    














- Constitutional


Appears: No Acute Distress





- ENT Exam


ENT Exam: Mucous Membranes Dry





- Respiratory Exam


Respiratory Exam: Decreased Breath Sounds, Rhonchi


Additional comments: 





NC 2-3 l/min





- Cardiovascular Exam


Cardiovascular Exam: +S1, +S2





- GI/Abdominal Exam


GI & Abdominal Exam: Soft, Normal Bowel Sounds


Additional comments: 





Kept NPO





- Extremities Exam


Extremities Exam: Full ROM, Normal Capillary Refill





- Neurological Exam


Additional comments: 





lethargic,confuse





- Psychiatric Exam


Psychiatric exam: Agitated





- Skin


Skin Exam: Intact, Normal Color, Warm





Assessment and Plan





- Assessment and Plan (Free Text)


Assessment: 





A 82 year old who came into the ER due to complaints of shortness of breath,

wheezing,headache. Wife gave him tylenol with no relief and seemed to be 

confuse. History of hypertension, hypercholesterolemia, hypothyroidism,COPD, 

deafness, history of fall 2 weeks ago prior to admission. CT of head showed 

small bilateral subdural hematoma.


























Plan: 


Being followed up by Neurology


Less agitated from baseline


Fall precaution


Kept NPO,Unable to take orally


Nutritional support


IV hydration until able to tolerate orally


On Catapres patch daily,Hydralazine 10 mg IV daily, Lopressor 5 mg IV daily,


  Synthroid 125mcg IVP daily and Ativan IV PRN


No evidence of cardiac ischemia


Cardiac status stable


Continue current medications


Continue current treatment





Will follow up








Plan and treatment discussed with Dr. Meehan

## 2018-05-05 NOTE — PN
DATE:  05/05/2018



SUBJECTIVE:  Patient is in bed, seen earlier this morning in 372, bed 1.



PHYSICAL EXAMINATION:

VITAL SIGNS:  Temperature is 98, blood pressure is 112/70, respiratory rate

of 16.

HEENT:  Unremarkable.

NECK:  Supple.

LUNGS:  Decreased breath sounds.

HEART:  Normal S1, S2.

ABDOMEN:  Soft.



LABORATORY EXAMINATION:  Reveals a white count is 12,900, hemoglobin of 11.

Microbiology is noted.



Review of orders reveals the patient to be off antibiotics.



ASSESSMENT AND PLAN:  This is an 82-year-old male seen earlier this morning

in 372, bed 1 with systemic inflammatory response syndrome, leukocytosis,

chronic obstructive lung disease, subdural hematoma with a blood culture

positive for corynebacterium consistent with a contamination.  Currently

off antibiotics.  The patient is at risk for developing nosocomial

infections.  We will follow with you.







__________________________________________

Todd Gary MD



DD:  05/05/2018 11:57:44

DT:  05/05/2018 12:00:07

Job # [id number]

## 2018-05-06 LAB
ERYTHROCYTE [DISTWIDTH] IN BLOOD BY AUTOMATED COUNT: 14.1 % (ref 11.5–14.5)
HGB BLD-MCNC: 14.4 G/DL (ref 14–18)
MCH RBC QN AUTO: 31.6 PG (ref 25–35)
MCHC RBC AUTO-ENTMCNC: 34.3 G/DL (ref 31–37)
MCV RBC AUTO: 92.1 FL (ref 80–105)
PLATELET # BLD: 256 10^3/UL (ref 120–450)
PMV BLD AUTO: 10.3 FL (ref 7–11)
RBC # BLD AUTO: 4.56 10^6/UL (ref 3.5–6.1)
WBC # BLD AUTO: 17.1 10^3/UL (ref 4.5–11)

## 2018-05-06 RX ADMIN — IPRATROPIUM BROMIDE AND ALBUTEROL SULFATE SCH ML: .5; 3 SOLUTION RESPIRATORY (INHALATION) at 19:47

## 2018-05-06 RX ADMIN — BUDESONIDE SCH MG: 0.25 SUSPENSION RESPIRATORY (INHALATION) at 07:58

## 2018-05-06 RX ADMIN — IPRATROPIUM BROMIDE AND ALBUTEROL SULFATE SCH ML: .5; 3 SOLUTION RESPIRATORY (INHALATION) at 07:46

## 2018-05-06 RX ADMIN — LEVOTHYROXINE SODIUM ANHYDROUS SCH MCG: 100 INJECTION, POWDER, LYOPHILIZED, FOR SOLUTION INTRAVENOUS at 14:38

## 2018-05-06 RX ADMIN — BUDESONIDE SCH MG: 0.25 SUSPENSION RESPIRATORY (INHALATION) at 19:48

## 2018-05-06 RX ADMIN — FLUCONAZOLE SCH MLS/HR: 200 INJECTION, SOLUTION INTRAVENOUS at 13:38

## 2018-05-06 RX ADMIN — BRIMONIDINE TARTRATE SCH DROP: 1.5 SOLUTION/ DROPS OPHTHALMIC at 22:46

## 2018-05-06 RX ADMIN — IPRATROPIUM BROMIDE AND ALBUTEROL SULFATE SCH ML: .5; 3 SOLUTION RESPIRATORY (INHALATION) at 01:29

## 2018-05-06 RX ADMIN — IPRATROPIUM BROMIDE AND ALBUTEROL SULFATE SCH ML: .5; 3 SOLUTION RESPIRATORY (INHALATION) at 13:40

## 2018-05-06 RX ADMIN — BRIMONIDINE TARTRATE SCH DROP: 1.5 SOLUTION/ DROPS OPHTHALMIC at 14:38

## 2018-05-06 RX ADMIN — DEXTROSE AND SODIUM CHLORIDE SCH MLS/HR: 5; 450 INJECTION, SOLUTION INTRAVENOUS at 13:37

## 2018-05-06 NOTE — CP.PCM.PN
Subjective





- Date & Time of Evaluation


Date of Evaluation: 05/06/18


Time of Evaluation: 07:15





- Subjective


Subjective: 





Restless, no distress, near nurses station,mittens intact





Reason for consult and follow up: Cardiac evaluation, Arrythmia, hypertension,

subdural hematoma,post fall, altered mental status, hypercholesterolemia





Seen and examined by me and Dr. Meehan








Objective





- Vital Signs/Intake and Output


Vital Signs (last 24 hours): 


 











Temp Pulse Resp BP Pulse Ox


 


 98.6 F   95 H  20   138/89   92 L


 


 05/06/18 08:24  05/06/18 08:24  05/06/18 08:24  05/06/18 08:24  05/06/18 08:24








Intake and Output: 


 











 05/06/18 05/06/18





 06:59 18:59


 


Intake Total 960 


 


Balance 960 














- Medications


Medications: 


 Current Medications





Albuterol/Ipratropium (Duoneb 3 Mg/0.5 Mg (3 Ml) Ud)  3 ml IH [id number] [facility]


   Last Admin: 05/06/18 07:46 Dose:  3 ml


Albuterol/Ipratropium (Duoneb 3 Mg/0.5 Mg (3 Ml) Ud)  3 ml IH [id number] PRN


   PRN Reason: Shortness of Breath


   Last Admin: 05/04/18 18:45 Dose:  3 ml


Atorvastatin Calcium (Lipitor)  20 mg PO DAILY [facility]


   Last Admin: 05/05/18 09:15 Dose:  20 mg


Brimonidine Tartrate (Alphagan P 0.15% Opht)  0 drop OS Q8H [facility]


   Last Admin: 05/05/18 21:43 Dose:  1 drop


Budesonide (Pulmicort Respules)  0.25 mg IH [id number] [facility]


   Last Admin: 05/06/18 07:58 Dose:  0.25 mg


Clonidine HCl (Catapres-Tts2 0.2 Mg/24 Hr)  1 patch TD Q7D@1000 [facility]


   Last Admin: 05/01/18 12:48 Dose:  1 patch


Home Med (Home Med)  1 unit OS [facility]


   Last Admin: 05/05/18 21:43 Dose:  1 unit


Home Med (Home Med)  1 unit PO [facility]


   Last Admin: 05/05/18 23:35 Dose:  Not Given


Hydralazine HCl (Apresoline)  10 mg IVP Q6 PRN


   PRN Reason: for SBP>160


   Last Admin: 05/04/18 09:05 Dose:  10 mg


Dextrose/Sodium Chloride (Dextrose 5%/0.45% Ns 1000 Ml)  1,000 mls @ 40 mls/hr 

IV .Q24H [facility]


   Last Admin: 05/05/18 09:44 Dose:  40 mls/hr


Levothyroxine Sodium (Synthroid)  125 mcg IVP DAILY [facility]


   Last Admin: 05/05/18 09:14 Dose:  125 mcg


Lorazepam (Ativan)  1 mg IVP Q6H PRN; Protocol


   PRN Reason: restlessness and agitation


   Last Admin: 05/05/18 03:29 Dose:  1 mg


Lorazepam (Ativan)  1 mg IVP TID [facility]


   PRN Reason: Protocol


   Last Admin: 05/05/18 21:30 Dose:  1 mg


Pantoprazole Sodium (Protonix Inj)  40 mg IVP DAILY [facility]


   Last Admin: 05/05/18 09:15 Dose:  40 mg


Risperidone (Risperdal Oral Soln)  0.5 mg PO AMHS [facility]


   PRN Reason: Protocol


   Last Admin: 05/05/18 21:46 Dose:  0.5 mg


Ziprasidone (Geodon Inj)  10 mg IM Q8H PRN; Protocol


   PRN Reason: agitation/psychosis


   Last Admin: 05/04/18 19:50 Dose:  10 mg











- Labs


Labs: 


 





 05/04/18 06:00 





 05/04/18 06:00 





 











PT  13.3 SECONDS (9.4-12.5)  H  04/28/18  11:39    


 


INR  1.15  (0.93-1.08)  H  04/28/18  11:39    


 


APTT  32.6 Seconds (25.1-36.5)   04/28/18  11:39    














- Constitutional


Appears: No Acute Distress, Confused





- ENT Exam


ENT Exam: Mucous Membranes Dry





- Respiratory Exam


Respiratory Exam: Decreased Breath Sounds, Wheezes





- Cardiovascular Exam


Cardiovascular Exam: +S1, +S2





- GI/Abdominal Exam


GI & Abdominal Exam: Soft, Normal Bowel Sounds





-  Exam


Additional comments: 





incontinent





- Extremities Exam


Extremities Exam: Normal Capillary Refill





- Neurological Exam


Neurological Exam: Awake


Additional comments: 





confuse,restless





- Skin


Skin Exam: Intact, Normal Color, Warm





Assessment and Plan





- Assessment and Plan (Free Text)


Assessment: 





A 82 year old who came into the ER due to complaints of shortness of breath,

wheezing,headache. Wife gave him tylenol with no relief and seemed to be 

confuse. History of hypertension, hypercholesterolemia, hypothyroidism,COPD, 

deafness, history of fall 2 weeks ago prior to admission. CT of head showed 

small bilateral subdural hematoma.





























Plan: 


Restless in bed, mittens in place


Fall precaution


Nutritional support


On Catapres patch daily,Hydralazine 10 mg IV PRN.


  Synthroid 125mcg IVP daily and Ativan IV PRN


Stable BP and heart rate


Cardiac status stable


Discharge planning


Continue current medications


Continue current treatment





Will follow up





Plan and treatment discussed with Dr. Meehan

## 2018-05-06 NOTE — PN
DATE:  05/06/2018



FOLLOWUP NOTE



SUBJECTIVE:  He has lots of agitation.  He is being followed by Psych. 

Leukocytosis is resolving.  Urine culture, blood culture negative.  Ativan

was increased to three times a day and Risperdal was increased to 0.5 mg

twice a day.  Agitation is better controlled now.



REVIEW OF SYSTEMS:  Could not be obtained because of altered sensorium.



PHYSICAL EXAMINATION:

VITAL SIGNS:  Temperature 98.8, heart rate is 109 per minute, blood

pressure 139/85, respiratory rate 19 per minute, oxygen saturation 92% on

oxygen by nasal cannula.

NEUROLOGIC:  Sensorium altered.

HEENT:  Normal.

CHEST:  Air entry present and equal bilaterally.  No added sounds.

CARDIOVASCULAR:  S1, S2 normal.  No murmur.  No gallop.

ABDOMEN:  Soft, nontender.  No hepatosplenomegaly.

EXTREMITIES:  No edema.

SPINE:  Nontender.

SKIN:  No petechiae.  No rash.



LABORATORY DATA:  White count 12.9, hemoglobin 11.6, hematocrit 36,

platelet 194.  Sodium 143, potassium 3.6, calcium 8.6, creatinine 0.8.



MEDICATIONS:  Albuterol, DuoNeb, Lipitor 20 mg daily, Pulmicort inhalation,

Catapres patch, dextrose, sodium chloride IV fluid at 40 mL an hour,

hydralazine, _____ Synthroid 125 mcg daily, Ativan 1 mg p.r.n. every 6

hours, Protonix 40 mg daily, Risperdal 2.5 mg at bedtime, Geodon every 8

hours.



ASSESSMENT:

1.  Bilateral subdural hematoma.

2.  Leukocytosis.

3.  Delirium.

4.  Anemia.

5.  Chronic obstructive pulmonary disease.



PLAN:  He is currently on bronchodilators, we will continue that. 

Agitation improved with current medication.  He is on Geodon and Ativan, we

will continue that, Risperdal also at bedtime.  Psych following.  ID

consultation not reviewed.  He is off antibiotics now.  Blood culture,

urine culture negative.  Continue Lipitor 20 mg daily.  He is awaiting

placement once he is clinically stable.







__________________________________________

Angela Warner MD



DD:  05/06/2018 7:02:42

DT:  05/06/2018 8:31:53

Job # [id number]

## 2018-05-06 NOTE — PN
DATE:  05/06/2018



SUBJECTIVE:  The patient is in bed, in no acute distress, nontoxic.



PHYSICAL EXAMINATION:

VITAL SIGNS:  Temperature is 98, blood pressure is 130/80, respiratory rate

of 22, heart rate of 120.

HEENT:  Examination of HEENT is unremarkable.

NECK:  Supple.

LUNGS:  Have decreased breath sounds.

HEART:  Normal S1, S2.

ABDOMEN:  Soft, nontender.



LABORATORY DATA:  Laboratory examination reveals a white count of 12,900,

hemoglobin of 11.  Chemistries are noted and BUN of 18, creatinine of 0.8. 

Procalcitonin is negative x2.  Microbiology reveals Corynebacterium in one

blood culture.  Review of orders reveals the patient to be off of

antibiotics.  Dr. Joshua's note is reviewed and hyperactive delirium due to

multiple medical issues.



ASSESSMENT AND PLAN:  An 82-year-old male, seen earlier today in 372, bed

1.  Systemic inflammatory response syndrome, leukocytosis, chronic

obstructive lung disease, subdural hematoma and one blood culture positive

for Corynebacterium, most likely a contamination.  Currently off of

antibiotics.  The patient is very high risk of developing pneumonia,

aspiration pneumonia, nosocomial infections, currently afebrile and last

white count was on 05/04/2018, which was 12,900.  We will obtain a repeat

CBC and check on the WBC.





__________________________________________

Todd Gary MD





DD:  05/06/2018 8:19:08

DT:  05/06/2018 8:22:58

Job # [id number]

## 2018-05-07 LAB
ALBUMIN SERPL-MCNC: 3.7 G/DL (ref 3–4.8)
ALBUMIN/GLOB SERPL: 1.3 {RATIO} (ref 1.1–1.8)
ALT SERPL-CCNC: 75 U/L (ref 7–56)
AST SERPL-CCNC: 55 U/L (ref 17–59)
BUN SERPL-MCNC: 18 MG/DL (ref 7–21)
CALCIUM SERPL-MCNC: 9.1 MG/DL (ref 8.4–10.5)
ERYTHROCYTE [DISTWIDTH] IN BLOOD BY AUTOMATED COUNT: 14.1 % (ref 11.5–14.5)
GFR NON-AFRICAN AMERICAN: > 60
HGB BLD-MCNC: 13.9 G/DL (ref 14–18)
MCH RBC QN AUTO: 30.9 PG (ref 25–35)
MCHC RBC AUTO-ENTMCNC: 33.3 G/DL (ref 31–37)
MCV RBC AUTO: 92.7 FL (ref 80–105)
PLATELET # BLD: 255 10^3/UL (ref 120–450)
PMV BLD AUTO: 10 FL (ref 7–11)
RBC # BLD AUTO: 4.5 10^6/UL (ref 3.5–6.1)
WBC # BLD AUTO: 16.5 10^3/UL (ref 4.5–11)

## 2018-05-07 RX ADMIN — DEXTROSE AND SODIUM CHLORIDE SCH MLS/HR: 5; 450 INJECTION, SOLUTION INTRAVENOUS at 09:44

## 2018-05-07 RX ADMIN — FLUCONAZOLE SCH MLS/HR: 200 INJECTION, SOLUTION INTRAVENOUS at 09:43

## 2018-05-07 RX ADMIN — LEVOTHYROXINE SODIUM ANHYDROUS SCH MCG: 100 INJECTION, POWDER, LYOPHILIZED, FOR SOLUTION INTRAVENOUS at 09:39

## 2018-05-07 RX ADMIN — BUDESONIDE SCH MG: 0.25 SUSPENSION RESPIRATORY (INHALATION) at 07:40

## 2018-05-07 RX ADMIN — BUDESONIDE SCH MG: 0.25 SUSPENSION RESPIRATORY (INHALATION) at 19:51

## 2018-05-07 RX ADMIN — BRIMONIDINE TARTRATE SCH DROP: 1.5 SOLUTION/ DROPS OPHTHALMIC at 22:10

## 2018-05-07 RX ADMIN — IPRATROPIUM BROMIDE AND ALBUTEROL SULFATE SCH ML: .5; 3 SOLUTION RESPIRATORY (INHALATION) at 19:51

## 2018-05-07 RX ADMIN — IPRATROPIUM BROMIDE AND ALBUTEROL SULFATE SCH ML: .5; 3 SOLUTION RESPIRATORY (INHALATION) at 07:40

## 2018-05-07 RX ADMIN — IPRATROPIUM BROMIDE AND ALBUTEROL SULFATE SCH ML: .5; 3 SOLUTION RESPIRATORY (INHALATION) at 13:12

## 2018-05-07 RX ADMIN — IPRATROPIUM BROMIDE AND ALBUTEROL SULFATE SCH ML: .5; 3 SOLUTION RESPIRATORY (INHALATION) at 01:20

## 2018-05-07 RX ADMIN — BRIMONIDINE TARTRATE SCH DROP: 1.5 SOLUTION/ DROPS OPHTHALMIC at 05:11

## 2018-05-07 RX ADMIN — BRIMONIDINE TARTRATE SCH DROP: 1.5 SOLUTION/ DROPS OPHTHALMIC at 14:30

## 2018-05-07 NOTE — RAD
HISTORY:

fever  



COMPARISON:

07/16/2015 



FINDINGS:



LUNGS:

No active pulmonary disease.



PLEURA:

No significant pleural effusion identified, no pneumothorax apparent.



CARDIOVASCULAR:

Moderate cardiomegaly and aortic tortuosity



OSSEOUS STRUCTURES:

No significant abnormalities.



VISUALIZED UPPER ABDOMEN:

Normal.



OTHER FINDINGS:

None.



IMPRESSION:

No active disease.

## 2018-05-07 NOTE — CP.PCM.PN
Subjective





- Date & Time of Evaluation


Date of Evaluation: 05/07/18


Time of Evaluation: 15:10





- Subjective


Subjective: 





Patient is comfortable in bed, no fevers.





Objective





- Vital Signs/Intake and Output


Vital Signs (last 24 hours): 


 











Temp Pulse Resp BP Pulse Ox


 


 99.4 F   102 H  19   138/88   95 


 


 05/07/18 08:09  05/07/18 08:09  05/07/18 08:09  05/07/18 08:09  05/07/18 08:09








Intake and Output: 


 











 05/07/18 05/07/18





 06:59 18:59


 


Intake Total 0 


 


Output Total 575 


 


Balance -575 














- Medications


Medications: 


 Current Medications





Albuterol/Ipratropium (Duoneb 3 Mg/0.5 Mg (3 Ml) Ud)  3 ml [facility] [id number] [facility]


   Last Admin: 05/07/18 07:40 Dose:  3 ml


Albuterol/Ipratropium (Duoneb 3 Mg/0.5 Mg (3 Ml) Ud)  3 ml IH [id number] PRN


   PRN Reason: Shortness of Breath


   Last Admin: 05/04/18 18:45 Dose:  3 ml


Atorvastatin Calcium (Lipitor)  20 mg PO DAILY [facility]


   Last Admin: 05/07/18 09:40 Dose:  20 mg


Brimonidine Tartrate (Alphagan P 0.15% Opht)  0 drop OS Q8H [facility]


   Last Admin: 05/07/18 05:11 Dose:  1 drop


Budesonide (Pulmicort Respules)  0.25 mg IH [id number] [facility]


   Last Admin: 05/07/18 07:40 Dose:  0.25 mg


Clonidine HCl (Catapres-Tts2 0.2 Mg/24 Hr)  1 patch TD Q7D@1000 [facility]


   Last Admin: 05/01/18 12:48 Dose:  1 patch


Home Med (Home Med)  1 unit OS [facility]


   Last Admin: 05/06/18 22:47 Dose:  Not Given


Home Med (Home Med)  1 unit PO [facility]


   Last Admin: 05/06/18 22:16 Dose:  Not Given


Hydralazine HCl (Apresoline)  10 mg IVP Q6 PRN


   PRN Reason: for SBP>160


   Last Admin: 05/04/18 09:05 Dose:  10 mg


Dextrose/Sodium Chloride (Dextrose 5%/0.45% Ns 1000 Ml)  1,000 mls @ 40 mls/hr 

IV .Q24H [facility]


   Last Admin: 05/07/18 09:44 Dose:  40 mls/hr


Fluconazole 100 mg/ (Miscellaneous)  50 mls @ 100 mls/hr IVPB DAILY [facility]


   PRN Reason: Protocol


   Last Admin: 05/07/18 09:43 Dose:  100 mls/hr


Levothyroxine Sodium (Synthroid)  125 mcg IVP DAILY [facility]


   Last Admin: 05/07/18 09:39 Dose:  125 mcg


Lorazepam (Ativan)  1 mg IVP Q6H PRN; Protocol


   PRN Reason: restlessness and agitation


   Last Admin: 05/07/18 02:17 Dose:  1 mg


Lorazepam (Ativan)  1 mg IVP TID [facility]


   PRN Reason: Protocol


   Last Admin: 05/06/18 18:57 Dose:  1 mg


Pantoprazole Sodium (Protonix Inj)  40 mg IVP DAILY [facility]


   Last Admin: 05/07/18 09:39 Dose:  40 mg


Risperidone (Risperdal Oral Soln)  0.5 mg PO AMHS [facility]


   PRN Reason: Protocol


   Last Admin: 05/07/18 10:00 Dose:  0.5 mg


Ziprasidone (Geodon Inj)  10 mg IM Q8H PRN; Protocol


   PRN Reason: agitation/psychosis


   Last Admin: 05/04/18 19:50 Dose:  10 mg











- Labs


Labs: 


 





 05/06/18 10:00 





 05/04/18 06:00 





 











PT  13.3 SECONDS (9.4-12.5)  H  04/28/18  11:39    


 


INR  1.15  (0.93-1.08)  H  04/28/18  11:39    


 


APTT  32.6 Seconds (25.1-36.5)   04/28/18  11:39    














- Constitutional


Appears: Non-toxic, Chronically Ill





- Head Exam


Head Exam: NORMAL INSPECTION





- ENT Exam


ENT Exam: Mucous Membranes Moist





- Respiratory Exam


Respiratory Exam: Decreased Breath Sounds





- Cardiovascular Exam


Cardiovascular Exam: +S1, +S2





- GI/Abdominal Exam


GI & Abdominal Exam: Soft.  absent: Tenderness





Assessment and Plan





- Assessment and Plan (Free Text)


Plan: 





Assessment


Systemic Inflammatory response syndrome, with increasing leukocytosis, probably 

from subdural hematoma in the head, R/O nosocomial infection/sepsis


COPD


deafness using hearing aides


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


history of alcoholism





Plan


continue to monitor off antibiotics - follow up blood, urine cx, PCT; reviewed 

repeat CXR which does not show infiltrates

## 2018-05-07 NOTE — CP.PCM.PN
Subjective





- Date & Time of Evaluation


Date of Evaluation: 05/07/18


Time of Evaluation: 06:40





- Subjective


Subjective: 





Restless, no distress, near nurses station,mittens intact





Reason for consult and follow up: Cardiac evaluation, Arrythmia, hypertension,

subdural hematoma,post fall, altered mental status, hypercholesterolemia





Seen and examined by me and Dr. Judge





Objective





- Vital Signs/Intake and Output


Vital Signs (last 24 hours): 


 











Temp Pulse Resp BP Pulse Ox


 


 98 F   108 H  22   128/80   98 


 


 05/06/18 18:00  05/06/18 18:00  05/06/18 18:00  05/06/18 18:00  05/06/18 18:00








Intake and Output: 


 











 05/07/18 05/07/18





 06:59 18:59


 


Intake Total 0 


 


Output Total 575 


 


Balance -575 














- Medications


Medications: 


 Current Medications





Albuterol/Ipratropium (Duoneb 3 Mg/0.5 Mg (3 Ml) Ud)  3 ml IH [id number] [facility]


   Last Admin: 05/07/18 07:40 Dose:  3 ml


Albuterol/Ipratropium (Duoneb 3 Mg/0.5 Mg (3 Ml) Ud)  3 ml IH [id number] PRN


   PRN Reason: Shortness of Breath


   Last Admin: 05/04/18 18:45 Dose:  3 ml


Atorvastatin Calcium (Lipitor)  20 mg PO DAILY [facility]


   Last Admin: 05/06/18 10:38 Dose:  Not Given


Brimonidine Tartrate (Alphagan P 0.15% Opht)  0 drop OS Q8H [facility]


   Last Admin: 05/07/18 05:11 Dose:  1 drop


Budesonide (Pulmicort Respules)  0.25 mg IH [id number] [facility]


   Last Admin: 05/07/18 07:40 Dose:  0.25 mg


Clonidine HCl (Catapres-Tts2 0.2 Mg/24 Hr)  1 patch TD Q7D@1000 [facility]


   Last Admin: 05/01/18 12:48 Dose:  1 patch


Home Med (Home Med)  1 unit OS [facility]


   Last Admin: 05/06/18 22:47 Dose:  Not Given


Home Med (Home Med)  1 unit PO [facility]


   Last Admin: 05/06/18 22:16 Dose:  Not Given


Hydralazine HCl (Apresoline)  10 mg IVP Q6 PRN


   PRN Reason: for SBP>160


   Last Admin: 05/04/18 09:05 Dose:  10 mg


Dextrose/Sodium Chloride (Dextrose 5%/0.45% Ns 1000 Ml)  1,000 mls @ 40 mls/hr 

IV .Q24H [facility]


   Last Admin: 05/06/18 13:37 Dose:  40 mls/hr


Fluconazole 100 mg/ (Miscellaneous)  50 mls @ 100 mls/hr IVPB DAILY [facility]


   PRN Reason: Protocol


   Last Admin: 05/06/18 13:38 Dose:  100 mls/hr


Levothyroxine Sodium (Synthroid)  125 mcg IVP DAILY [facility]


   Last Admin: 05/06/18 14:38 Dose:  125 mcg


Lorazepam (Ativan)  1 mg IVP Q6H PRN; Protocol


   PRN Reason: restlessness and agitation


   Last Admin: 05/07/18 02:17 Dose:  1 mg


Lorazepam (Ativan)  1 mg IVP TID [facility]


   PRN Reason: Protocol


   Last Admin: 05/06/18 18:57 Dose:  1 mg


Pantoprazole Sodium (Protonix Inj)  40 mg IVP DAILY [facility]


   Last Admin: 05/06/18 14:38 Dose:  40 mg


Risperidone (Risperdal Oral Soln)  0.5 mg PO AMHS [facility]


   PRN Reason: Protocol


   Last Admin: 05/06/18 22:45 Dose:  0.5 mg


Ziprasidone (Geodon Inj)  10 mg IM Q8H PRN; Protocol


   PRN Reason: agitation/psychosis


   Last Admin: 05/04/18 19:50 Dose:  10 mg











- Labs


Labs: 


 





 05/06/18 10:00 





 05/04/18 06:00 





 











PT  13.3 SECONDS (9.4-12.5)  H  04/28/18  11:39    


 


INR  1.15  (0.93-1.08)  H  04/28/18  11:39    


 


APTT  32.6 Seconds (25.1-36.5)   04/28/18  11:39    














- Constitutional


Appears: No Acute Distress





- ENT Exam


ENT Exam: Mucous Membranes Dry





- Respiratory Exam


Respiratory Exam: Decreased Breath Sounds, Rhonchi, NORMAL BREATHING PATTERN





- Cardiovascular Exam


Cardiovascular Exam: +S1, +S2





- GI/Abdominal Exam


GI & Abdominal Exam: Soft, Normal Bowel Sounds





-  Exam


Additional comments: 





incontinent





- Extremities Exam


Extremities Exam: Normal Capillary Refill





- Neurological Exam


Additional comments: 





confuse,agitated





- Psychiatric Exam


Psychiatric exam: Agitated





- Skin


Skin Exam: Intact, Normal Color, Warm





Assessment and Plan





- Assessment and Plan (Free Text)


Assessment: 





A 82 year old who came into the ER due to complaints of shortness of breath,

wheezing,headache. Wife gave him tylenol with no relief and seemed to be 

confuse. History of hypertension, hypercholesterolemia, hypothyroidism,COPD, 

deafness, history of fall 2 weeks ago prior to admission. CT of head showed 

small bilateral subdural hematoma.
































Plan: 





Fall precaution


Nutritional support, ? PEG insertion for tubefeeding


IV fluid at 40cc/hr


On Catapres patch daily,Hydralazine 10 mg IV PRN.


  Synthroid 125mcg IVP daily and Ativan IV PRN


Stable BP and heart rate


Cardiac status stable


Discharge planning


Continue current medications


Continue current treatment





Will follow up





Plan and treatment discussed with Dr. Judge

## 2018-05-07 NOTE — PN
DATE OF EXAM:  05/05/2018



HISTORY OF PRESENT ILLNESS:  Mr. Salazar is an 82-year-old male admitted

to the hospital with agitation, shortness of breath.  He has COPD,

hypothyroidism, hearing impairment.  He has bilateral subdural hematoma. 

He has baseline agitation.  He was agitated in the night, being followed by

Psychiatry.  History of coronary artery disease.



REVIEW OF SYSTEMS:  _____, currently on Geodon and Ativan.



ALLERGIES:  NO KNOWN DRUG ALLERGIES.



HOME MEDICATIONS:  Levothyroxine, lisinopril, simvastatin, _____,

terazosin, Spiriva, aspirin.



SOCIAL HISTORY:  Lives at home with wife.  He is hearing impaired, vision

impaired.



FAMILY HISTORY:  Noncontributory.



PHYSICAL EXAMINATION:

GENERAL:  Comfortable in bed, no acute distress.

VITAL SIGNS:  Temperature 97.8, heart rate is 80 per minute, blood pressure

160/90, respiratory rate 25 per minute, oxygen saturation 99% on room air.

HEENT:  Pallor positive.

NECK:  No lymphadenopathy.

CHEST:  Air entry present and equal bilaterally.  No added sounds.

CARDIOVASCULAR:  S1 and S2 normal.  No murmur, no gallop.

ABDOMEN:  Soft, nontender.  No hepatosplenomegaly.

EXTREMITIES:  No edema.

SKIN:  No petechiae.  No rash.



LABORATORY DATA:  Sodium 143, potassium 3.6, creatinine 0.8.  White count

elevated to 26,000, now 12.9; hemoglobin 11.6, hematocrit 36.6, platelet

194.



ASSESSMENT:

1.  Bilateral subdural hematoma.

2.  Delirium.

3.  Hearing impaired.

4.  Vision impaired.

5.  Anemia.

6.  Leukocytosis.



PLAN:  He is currently followed by Psychiatry.  Currently on Ativan p.r.n.

and Geodon for agitation.  He had bilateral subdural hematoma, being

followed by Neurology.COPD, continue bronchodilators.  Continue

Lipitor 20 mg daily.  He is on IV fluids, dextrose, normal saline 50 mL an

hour.  Blood pressure controlled on current medications.  Continue

Synthroid 125  daily.  He is n.p.o.  He had leukocytosis ,

[place] [address].  Blood cultures showed CNS likely contaminant.  Urine

culture is negative.  .





__________________________________________

Angela Warner MD



DD:  05/05/2018 11:34:57

DT:  05/05/2018 12:24:46

Job # [id number]

MTDD

## 2018-05-07 NOTE — PN
DATE:  05/07/2018



SUBJECTIVE:  Patient has no complaints of any _____.  Patient continues to

be confused.



PHYSICAL EXAMINATION:

VITAL SIGNS:  Temperature is 98, pulse is 108, blood pressure 128/80,

respirations 22.

GENERAL:  The patient is lying in bed, flat, comfortable.

HEENT:  No oral lesion.  Anicteric sclerae.  Moist mucosa.

NECK:  No JVD, adenopathy, or thyromegaly.

CARDIOVASCULAR:  S1 and S2, regular.  No murmurs, rubs, or gallops.

LUNGS:  Clear to auscultation bilaterally.  No wheeze, rales, or rhonchi.

ABDOMEN:  Bowel sounds are positive.  Soft, nontender and nondistended.

EXTREMITIES:  No cyanosis, clubbing or edema.



LABORATORY DATA:  White count 17.1, hemoglobin 14.1, creatinine is 0.8.



ASSESSMENT:

1.  Leukocytosis.

2.  Delirium.

3.  Bilateral subdural hematoma.

4.  Anemia.

5.  Visual impairment.

6.  Dyslipidemia.

7.  Hypothyroidism.

8.  Hearing impairment.



PLAN:  The patient has an elevated white count.  Initial blood cultures and

urine cultures have been normal on the last blood work that was done.  The

patient is on Ativan.  He is going to be on clonidine for his high blood

pressure.  He is on IV fluids.  The patient is on Geodon.  He is on

Protonix daily.  He is on Synthroid for hypothyroidism.  He is on BiPAP. 

His delirium is multifactorial.  His overall prognosis is guarded.  His

white count is elevated.  He may have a hospital-acquired infection. I

ordered a chest x-ray.







__________________________________________

Sebastian Tolentino MD



DD:  05/07/2018 6:25:42

DT:  05/07/2018 8:03:24

Job # [id number]

## 2018-05-07 NOTE — PN
DATE:  05/07/2018



FOLLOWUP NOTE



SUBJECTIVE:  This writer was involved into the patient's care because the

patient has episodes of confusion, delirium, and medication management was

concerned for this patient.  The patient has multiple medical issues

including leukocytosis, delirium, bilateral subdural hematoma, anemia,

visual impairment, dyslipidemia, hypothyroidism, and hearing impairment. 

No acute issues with the patient.  The patient is still in delirium stage. 

The patient requires p.r.n. medications.  There is no meaningful

conversation possible.  The patient was seen today.  The patient appears to

be restless, anxious, breathing fast.



PHYSICAL EXAMINATION:

VITAL SIGNS:  Reviewed.  Temperature 99.4, pulse is 102, blood pressure

138/88, respirations 19, oxygen saturation is 95%.



MEDICATIONS:  Reviewed.  The patient is on DuoNeb, Lipitor, Pulmicort,

Catapres, dextrose.  The patient also is on hydralazine, Synthroid, Ativan

1 mg IV push every 6 hours p.r.n. and 1 mg IV push three times a day

scheduled.  The patient is on Geodon 10 mg IM every 8 hours as needed. 

_____ and Risperdal 0.5 mg twice a day will be increased.



LABORATORY DATA:  Reviewed.  The patient has leukocytosis 16.5 , hemoglobin

and hematocrit 13.9 and 41.9 respectively.  Chemistry reviewed.  AST and

ALT trending down.  Sodium 154.  Toxicology reviewed.  Serology reviewed.



MENTAL STATUS EXAM:  The patient appears to be restless, uncomfortable,

anxious.  No meaningful conversation possible because the patient has

hearing problems as well as the patient is very confused.  The patient is

still in delirium stage.



IMPRESSION:  Multifactorial delirium, hyperactive.  Please see medical team

notes for more detailed information.



PLAN:  As per medical team, palliative care was involved.  This writer

adjusting medications Risperdal as well as Ativan as well as Geodon should

be continued.  This writer will follow up and advise accordingly.  I hope

this patient will improve faster.



Thank you very much for letting me participate in care of your patient.







__________________________________________

Josiane Oneill MD



DD:  05/07/2018 15:08:03

DT:  05/07/2018 15:12:10

Job # [id number]

## 2018-05-08 LAB
ALBUMIN SERPL-MCNC: 3.5 G/DL (ref 3–4.8)
ALBUMIN/GLOB SERPL: 1.2 {RATIO} (ref 1.1–1.8)
ALT SERPL-CCNC: 72 U/L (ref 7–56)
AST SERPL-CCNC: 45 U/L (ref 17–59)
BUN SERPL-MCNC: 19 MG/DL (ref 7–21)
CALCIUM SERPL-MCNC: 8.7 MG/DL (ref 8.4–10.5)
ERYTHROCYTE [DISTWIDTH] IN BLOOD BY AUTOMATED COUNT: 14.3 % (ref 11.5–14.5)
GFR NON-AFRICAN AMERICAN: > 60
HGB BLD-MCNC: 13.6 G/DL (ref 14–18)
MCH RBC QN AUTO: 30.9 PG (ref 25–35)
MCHC RBC AUTO-ENTMCNC: 32.9 G/DL (ref 31–37)
MCV RBC AUTO: 94.1 FL (ref 80–105)
PLATELET # BLD: 246 10^3/UL (ref 120–450)
PMV BLD AUTO: 10.4 FL (ref 7–11)
RBC # BLD AUTO: 4.4 10^6/UL (ref 3.5–6.1)
WBC # BLD AUTO: 15.2 10^3/UL (ref 4.5–11)

## 2018-05-08 RX ADMIN — BRIMONIDINE TARTRATE SCH DROP: 1.5 SOLUTION/ DROPS OPHTHALMIC at 14:08

## 2018-05-08 RX ADMIN — IPRATROPIUM BROMIDE AND ALBUTEROL SULFATE SCH ML: .5; 3 SOLUTION RESPIRATORY (INHALATION) at 21:05

## 2018-05-08 RX ADMIN — BRIMONIDINE TARTRATE SCH DROP: 1.5 SOLUTION/ DROPS OPHTHALMIC at 05:09

## 2018-05-08 RX ADMIN — IPRATROPIUM BROMIDE AND ALBUTEROL SULFATE SCH ML: .5; 3 SOLUTION RESPIRATORY (INHALATION) at 08:01

## 2018-05-08 RX ADMIN — IPRATROPIUM BROMIDE AND ALBUTEROL SULFATE SCH ML: .5; 3 SOLUTION RESPIRATORY (INHALATION) at 13:41

## 2018-05-08 RX ADMIN — FLUCONAZOLE SCH MLS/HR: 200 INJECTION, SOLUTION INTRAVENOUS at 10:02

## 2018-05-08 RX ADMIN — BRIMONIDINE TARTRATE SCH DROP: 1.5 SOLUTION/ DROPS OPHTHALMIC at 21:51

## 2018-05-08 RX ADMIN — IPRATROPIUM BROMIDE AND ALBUTEROL SULFATE SCH ML: .5; 3 SOLUTION RESPIRATORY (INHALATION) at 01:10

## 2018-05-08 RX ADMIN — BUDESONIDE SCH MG: 0.25 SUSPENSION RESPIRATORY (INHALATION) at 21:05

## 2018-05-08 RX ADMIN — LEVOTHYROXINE SODIUM ANHYDROUS SCH MCG: 100 INJECTION, POWDER, LYOPHILIZED, FOR SOLUTION INTRAVENOUS at 10:07

## 2018-05-08 NOTE — CP.PCM.PN
Subjective





- Date & Time of Evaluation


Date of Evaluation: 05/08/18


Time of Evaluation: 11:10





- Subjective


Subjective: 


Comfortable, no fevers, not in distress.





Objective





- Vital Signs/Intake and Output


Vital Signs (last 24 hours): 


 











Temp Pulse Resp BP Pulse Ox


 


 99.2 F   80   20   123/87   96 


 


 05/08/18 08:07  05/08/18 08:07  05/08/18 08:07  05/08/18 08:07  05/08/18 08:07








Intake and Output: 


 











 05/08/18 05/08/18





 06:59 18:59


 


Intake Total 1200 


 


Output Total 600 


 


Balance 600 














- Medications


Medications: 


 Current Medications





Acetaminophen (Tylenol 650 Mg Supp)  650 mg RC Q4H PRN


   PRN Reason: Fever >100.4 F


Albuterol/Ipratropium (Duoneb 3 Mg/0.5 Mg (3 Ml) Ud)  3 ml IH [id number] [facility]


   Last Admin: 05/08/18 08:01 Dose:  3 ml


Albuterol/Ipratropium (Duoneb 3 Mg/0.5 Mg (3 Ml) Ud)  3 ml IH [id number] PRN


   PRN Reason: Shortness of Breath


   Last Admin: 05/04/18 18:45 Dose:  3 ml


Atorvastatin Calcium (Lipitor)  20 mg PO DAILY [facility]


   Last Admin: 05/08/18 10:11 Dose:  Not Given


Brimonidine Tartrate (Alphagan P 0.15% Opht)  0 drop OS Q8H [facility]


   Last Admin: 05/08/18 05:09 Dose:  1 drop


Budesonide (Pulmicort Respules)  0.25 mg IH [id number] [facility]


   Last Admin: 05/07/18 19:51 Dose:  0.25 mg


Clonidine HCl (Catapres-Tts2 0.2 Mg/24 Hr)  1 patch TD Q7D@1000 [facility]


   Last Admin: 05/01/18 12:48 Dose:  1 patch


Home Med (Home Med)  1 unit OS [facility]


   Last Admin: 05/07/18 22:10 Dose:  1 unit


Home Med (Home Med)  1 unit PO [facility]


   Last Admin: 05/08/18 00:22 Dose:  Not Given


Hydralazine HCl (Apresoline)  10 mg IVP Q6 PRN


   PRN Reason: for SBP>160


   Last Admin: 05/04/18 09:05 Dose:  10 mg


Fluconazole 100 mg/ (Miscellaneous)  50 mls @ 100 mls/hr IVPB DAILY [facility]


   PRN Reason: Protocol


   Last Admin: 05/08/18 10:02 Dose:  100 mls/hr


Dextrose (Dextrose 5% In Water 1000 Ml)  1,000 mls @ 100 mls/hr IV .Q10H [facility]


   Last Admin: 05/08/18 05:10 Dose:  100 mls/hr


Levothyroxine Sodium (Synthroid)  125 mcg IVP DAILY [facility]


   Last Admin: 05/08/18 10:07 Dose:  125 mcg


Lorazepam (Ativan)  1 mg IVP Q6H PRN; Protocol


   PRN Reason: restlessness and agitation


   Last Admin: 05/08/18 00:11 Dose:  1 mg


Lorazepam (Ativan)  1 mg IVP TID [facility]


   PRN Reason: Protocol


   Last Admin: 05/07/18 18:24 Dose:  1 mg


Pantoprazole Sodium (Protonix Inj)  40 mg IVP DAILY [facility]


   Last Admin: 05/08/18 10:10 Dose:  40 mg


Risperidone (Risperdal Oral Soln)  0.5 mg PO AMHS [facility]


   PRN Reason: Protocol


   Last Admin: 05/07/18 22:27 Dose:  0.5 mg


Ziprasidone (Geodon Inj)  10 mg IM Q8H PRN; Protocol


   PRN Reason: agitation/psychosis


   Last Admin: 05/04/18 19:50 Dose:  10 mg











- Labs


Labs: 


 





 05/08/18 06:00 





 05/08/18 06:00 





 











PT  13.3 SECONDS (9.4-12.5)  H  04/28/18  11:39    


 


INR  1.15  (0.93-1.08)  H  04/28/18  11:39    


 


APTT  32.6 Seconds (25.1-36.5)   04/28/18  11:39    














- Constitutional


Appears: Chronically Ill





- Head Exam


Head Exam: NORMAL INSPECTION





- Respiratory Exam


Respiratory Exam: Decreased Breath Sounds





- Cardiovascular Exam


Cardiovascular Exam: +S1, +S2





- GI/Abdominal Exam


GI & Abdominal Exam: Soft.  absent: Tenderness





Assessment and Plan





- Assessment and Plan (Free Text)


Plan: 





Assessment


Systemic Inflammatory response syndrome, with increasing leukocytosis, probably 

from subdural hematoma in the head, R/O nosocomial infection/sepsis


COPD


deafness using hearing aides


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


history of alcoholism





Plan


continue to monitor off antibiotics - repeat blood cx are negative so far, 

follow up urine cx, repeat PCT is <0.05; reviewed repeat CXR which does not 

show infiltrates

## 2018-05-08 NOTE — CP.PCM.PN
Subjective





- Date & Time of Evaluation


Date of Evaluation: 05/08/18


Time of Evaluation: 06:45





- Subjective


Subjective: 





Restless, no distress, near nurses station,mittens intact





Reason for consult and follow up: Cardiac evaluation, Arrythmia, hypertension,

subdural hematoma,post fall, altered mental status, hypercholesterolemia





Seen and examined by me and Dr. Judge





Objective





- Vital Signs/Intake and Output


Vital Signs (last 24 hours): 


 











Temp Pulse Resp BP Pulse Ox


 


 98.9 F   114 H  22   139/84   94 L


 


 05/08/18 00:22  05/07/18 17:04  05/07/18 17:04  05/07/18 17:04  05/07/18 17:04








Intake and Output: 


 











 05/08/18 05/08/18





 06:59 18:59


 


Intake Total 1200 


 


Output Total 600 


 


Balance 600 














- Medications


Medications: 


 Current Medications





Acetaminophen (Tylenol 650 Mg Supp)  650 mg RC Q4H PRN


   PRN Reason: Fever >100.4 F


Albuterol/Ipratropium (Duoneb 3 Mg/0.5 Mg (3 Ml) Ud)  3 ml IH [id number] [facility]


   Last Admin: 05/08/18 01:10 Dose:  3 ml


Albuterol/Ipratropium (Duoneb 3 Mg/0.5 Mg (3 Ml) Ud)  3 ml IH [id number] PRN


   PRN Reason: Shortness of Breath


   Last Admin: 05/04/18 18:45 Dose:  3 ml


Atorvastatin Calcium (Lipitor)  20 mg PO DAILY [facility]


   Last Admin: 05/07/18 18:29 Dose:  Not Given


Brimonidine Tartrate (Alphagan P 0.15% Opht)  0 drop OS Q8H [facility]


   Last Admin: 05/08/18 05:09 Dose:  1 drop


Budesonide (Pulmicort Respules)  0.25 mg IH [id number] [facility]


   Last Admin: 05/07/18 19:51 Dose:  0.25 mg


Clonidine HCl (Catapres-Tts2 0.2 Mg/24 Hr)  1 patch TD Q7D@1000 [facility]


   Last Admin: 05/01/18 12:48 Dose:  1 patch


Home Med (Home Med)  1 unit OS [facility]


   Last Admin: 05/07/18 22:10 Dose:  1 unit


Home Med (Home Med)  1 unit PO [facility]


   Last Admin: 05/08/18 00:22 Dose:  Not Given


Hydralazine HCl (Apresoline)  10 mg IVP Q6 PRN


   PRN Reason: for SBP>160


   Last Admin: 05/04/18 09:05 Dose:  10 mg


Fluconazole 100 mg/ (Miscellaneous)  50 mls @ 100 mls/hr IVPB DAILY [facility]


   PRN Reason: Protocol


   Last Admin: 05/07/18 09:43 Dose:  100 mls/hr


Dextrose (Dextrose 5% In Water 1000 Ml)  1,000 mls @ 100 mls/hr IV .Q10H [facility]


   Last Admin: 05/08/18 05:10 Dose:  100 mls/hr


Levothyroxine Sodium (Synthroid)  125 mcg IVP DAILY [facility]


   Last Admin: 05/07/18 09:39 Dose:  125 mcg


Lorazepam (Ativan)  1 mg IVP Q6H PRN; Protocol


   PRN Reason: restlessness and agitation


   Last Admin: 05/08/18 00:11 Dose:  1 mg


Lorazepam (Ativan)  1 mg IVP TID [facility]


   PRN Reason: Protocol


   Last Admin: 05/07/18 18:24 Dose:  1 mg


Pantoprazole Sodium (Protonix Inj)  40 mg IVP DAILY [facility]


   Last Admin: 05/07/18 09:39 Dose:  40 mg


Risperidone (Risperdal Oral Soln)  0.5 mg PO AMHS [facility]


   PRN Reason: Protocol


   Last Admin: 05/07/18 22:27 Dose:  0.5 mg


Ziprasidone (Geodon Inj)  10 mg IM Q8H PRN; Protocol


   PRN Reason: agitation/psychosis


   Last Admin: 05/04/18 19:50 Dose:  10 mg











- Labs


Labs: 


 





 05/08/18 06:00 





 05/08/18 06:00 





 











PT  13.3 SECONDS (9.4-12.5)  H  04/28/18  11:39    


 


INR  1.15  (0.93-1.08)  H  04/28/18  11:39    


 


APTT  32.6 Seconds (25.1-36.5)   04/28/18  11:39    














- Constitutional


Appears: No Acute Distress





- ENT Exam


ENT Exam: Mucous Membranes Dry





- Respiratory Exam


Respiratory Exam: Decreased Breath Sounds, NORMAL BREATHING PATTERN


Additional comments: 





crackles with dry cough


NC 2-3l/min





- Cardiovascular Exam


Cardiovascular Exam: +S1, +S2





- GI/Abdominal Exam


GI & Abdominal Exam: Soft, Normal Bowel Sounds





- Extremities Exam


Extremities Exam: Full ROM, Normal Capillary Refill





- Neurological Exam


Additional comments: 





lethargic,confuse, per RN follows commands





- Psychiatric Exam


Psychiatric exam: Agitated





- Skin


Skin Exam: Intact, Normal Color, Warm





Assessment and Plan





- Assessment and Plan (Free Text)


Assessment: 





 82 year old who came into the ER due to complaints of shortness of breath,

wheezing,headache. Wife gave him tylenol with no relief and seemed to be 

confuse. History of hypertension, hypercholesterolemia, hypothyroidism,COPD, 

deafness, history of fall 2 weeks ago prior to admission. CT of head showed 

small bilateral subdural hematoma. no aggressive cardiac work up at this point 

until neuro status stabilized.



































Plan: 


Dr. Judge spoke with wife about PEG insertion for nutritional support however


wife refused and claimed that he is able to eat with assistance. Probably less 

sedation during meal


times to be able to participate/cooperate


Leukocytosis, followed up by ID


Trend caloric total intake


Aspiration precaution


IV fluid at 40cc/hr


On Catapres patch daily,Hydralazine 10 mg IV PRN.


  Synthroid 125mcg IVP daily and Ativan IV PRN


Stable BP and heart rate


Cardiac status stable


Continue current medications


Continue current treatment


Fall precaution


Will follow up





Plan and treatment discussed with Dr. Judge

## 2018-05-08 NOTE — CP.PCM.CON
History of Present Illness





- History of Present Illness


History of Present Illness: 





Palliate consult requested by Dr FLIP Tolentino


Reason: Advance care planning





82 year old male with history of HTN, COPD,hypothyroidism who presented with 

shortness of breath, productive,cough,wheezing and headache. CT of head showed 

small bilateral hematomas with minimal localized mass effect. Neurosurgery did 

not recommend surgical intervention.


Chest x ray showed no active disease. EKG was WNL. Labs; leukocytosis, 

hypernatremia,transaminitis. 





PMHx: HTN, HLD, COPD, hypothyroidism, cataracts, hearing loss , CAD s/p stent.





Family History: Non contributory





Social History: Former heavy smoker, past alcohol use.  and is now 

remarried to wife,Massiel Salazar.





Advance Care Planning:The patient does not have an Advanced Directive.





Review of Systems: As per HPI, patient is altered, unable to obtain.





Past Patient History





- Infectious Disease


Hx of Infectious Diseases: None





- Tetanus Immunizations


Tetanus Immunization: Up to Date





- Past Social History


Smoking Status: Former Smoker





- CARDIAC


Hx Cardiac Disorders: Yes (CARDIAC STNEET 2003)


Hx Hypercholesterolemia: Yes


Hx Hypertension: Yes





- PULMONARY


Hx Chronic Obstructive Pulmonary Disease (COPD): Yes





- NEUROLOGICAL


Hx Neurological Disorder: Yes





- HEENT


Hx HEENT Problems: Yes


Hx Blind: Yes (RIGHT EYE)


Hx Cataracts: Yes (LEFT EYE)


Hx Deafness: Yes (RIGHT EAR)





- RENAL


Hx Chronic Kidney Disease: No





- ENDOCRINE/METABOLIC


Hx Hypothyroidism: Yes





- HEMATOLOGICAL/ONCOLOGICAL


Hx Blood Disorders: No





- INTEGUMENTARY


Hx Dermatological Problems: No





- MUSCULOSKELETAL/RHEUMATOLOGICAL


Hx Musculoskeletal Disorders: No


Hx Falls: Yes





- GASTROINTESTINAL


Hx Gastrointestinal Disorders: No





- GENITOURINARY/GYNECOLOGICAL


Hx Genitourinary Disorders: No





- PSYCHIATRIC


Hx Depression: No


Hx Emotional Abuse: No


Hx Physical Abuse: No


Hx Substance Use: No





- SURGICAL HISTORY


Hx Surgeries: Yes (CARDIAC STENT 2003, R EYE SX, AP, T&A)





- ANESTHESIA


Hx Anesthesia: Yes


Hx Anesthesia Reactions: No


Hx Malignant Hyperthermia: No





Meds


Allergies/Adverse Reactions: 


 Allergies











Allergy/AdvReac Type Severity Reaction Status Date / Time


 


No Known Allergies Allergy   Verified 04/28/18 15:33














- Medications


Medications: 


 Current Medications





Acetaminophen (Tylenol 650 Mg Supp)  650 mg RC Q4H PRN


   PRN Reason: Fever >100.4 F


Albuterol/Ipratropium (Duoneb 3 Mg/0.5 Mg (3 Ml) Ud)  3 ml IH [id number] [facility]


   Last Admin: 05/08/18 08:01 Dose:  3 ml


Albuterol/Ipratropium (Duoneb 3 Mg/0.5 Mg (3 Ml) Ud)  3 ml IH [id number] PRN


   PRN Reason: Shortness of Breath


   Last Admin: 05/04/18 18:45 Dose:  3 ml


Atorvastatin Calcium (Lipitor)  20 mg PO DAILY [facility]


   Last Admin: 05/08/18 10:11 Dose:  Not Given


Brimonidine Tartrate (Alphagan P 0.15% Opht)  0 drop OS Q8H [facility]


   Last Admin: 05/08/18 05:09 Dose:  1 drop


Budesonide (Pulmicort Respules)  0.25 mg IH [id number] [facility]


   Last Admin: 05/07/18 19:51 Dose:  0.25 mg


Clonidine HCl (Catapres-Tts2 0.2 Mg/24 Hr)  1 patch TD Q7D@1000 [facility]


   Last Admin: 05/08/18 10:42 Dose:  1 patch


Home Med (Home Med)  1 unit OS HS [facility]


   Last Admin: 05/07/18 22:10 Dose:  1 unit


Home Med (Home Med)  1 unit PO [facility]


   Last Admin: 05/08/18 00:22 Dose:  Not Given


Hydralazine HCl (Apresoline)  10 mg IVP Q6 PRN


   PRN Reason: for SBP>160


   Last Admin: 05/04/18 09:05 Dose:  10 mg


Fluconazole 100 mg/ (Miscellaneous)  50 mls @ 100 mls/hr IVPB DAILY [facility]


   PRN Reason: Protocol


   Last Admin: 05/08/18 10:02 Dose:  100 mls/hr


Dextrose (Dextrose 5% In Water 1000 Ml)  1,000 mls @ 100 mls/hr IV .Q10H [facility]


   Last Admin: 05/08/18 05:10 Dose:  100 mls/hr


Potassium Chloride (Potassium Chloride 20 Meq/100 Ml)  20 meq in 100 mls @ 50 

mls/hr IVPB ONCE ONE


   Stop: 05/08/18 13:25


Levothyroxine Sodium (Synthroid)  100 mcg IVP DAILY [facility]


Lorazepam (Ativan)  1 mg IVP Q6H PRN; Protocol


   PRN Reason: restlessness and agitation


   Last Admin: 05/08/18 00:11 Dose:  1 mg


Lorazepam (Ativan)  1 mg IVP TID [facility]


   PRN Reason: Protocol


   Last Admin: 05/08/18 11:31 Dose:  Not Given


Pantoprazole Sodium (Protonix Inj)  40 mg IVP DAILY [facility]


   Last Admin: 05/08/18 10:10 Dose:  40 mg


Risperidone (Risperdal Oral Soln)  0.5 mg PO AMHS [facility]


   PRN Reason: Protocol


   Last Admin: 05/08/18 10:43 Dose:  0.5 mg


Ziprasidone (Geodon Inj)  10 mg IM Q8H PRN; Protocol


   PRN Reason: agitation/psychosis


   Last Admin: 05/04/18 19:50 Dose:  10 mg











Physical Exam





- Constitutional


Appears: Chronically Ill





- Head Exam


Head Exam: NORMAL INSPECTION





- Eye Exam


Additional comments: 





cataracts





- Neck Exam


Neck exam: Positive for: Normal Inspection





- Respiratory Exam


Respiratory Exam: Decreased Breath Sounds, Wheezes





- Cardiovascular Exam


Cardiovascular Exam: REGULAR RHYTHM, +S1, +S2





- GI/Abdominal Exam


GI & Abdominal Exam: Normal Bowel Sounds, Soft


Additional comments: 





no tenderness





- Extremities Exam


Extremities exam: Positive for: pedal edema, pedal pulses present





- Back Exam


Back exam: NORMAL INSPECTION





- Neurological Exam


Neurological exam: Altered





- Psychiatric Exam


Additional comments: 





restless





- Skin


Skin Exam: Dry, Pallor





- Additional Findings


Additional findings: 





Palliative performance scale rating 40 %





Results





- Vital Signs


Recent Vital Signs: 


 Last Vital Signs











Temp  99.2 F   05/08/18 08:07


 


Pulse  110 H  05/08/18 10:42


 


Resp  20   05/08/18 08:07


 


BP  123/66   05/08/18 10:42


 


Pulse Ox  96   05/08/18 08:07














- Labs


Result Diagrams: 


 05/09/18 06:00





 05/09/18 06:00


Labs: 


 Laboratory Results - last 24 hr











  05/07/18 05/07/18 05/07/18





  11:50 11:50 11:50


 


WBC   16.5 H 


 


RBC   4.50 


 


Hgb   13.9 L 


 


Hct   41.7 L 


 


MCV   92.7 


 


MCH   30.9 


 


MCHC   33.3 


 


RDW   14.1 


 


Plt Count   255 


 


MPV   10.0 


 


Sodium    154 H


 


Potassium    3.8


 


Chloride    114 H


 


Carbon Dioxide    27


 


Anion Gap    17


 


BUN    18


 


Creatinine    0.9


 


Est GFR ( Amer)    > 60


 


Est GFR (Non-Af Amer)    > 60


 


Random Glucose    128 H


 


Calcium    9.1


 


Total Bilirubin    1.9 H


 


AST    55


 


ALT    75 H


 


Alkaline Phosphatase    90


 


Total Protein    6.6


 


Albumin    3.7


 


Globulin    2.9


 


Albumin/Globulin Ratio    1.3


 


Procalcitonin  < 0.05 L  














  05/08/18 05/08/18





  06:00 06:00


 


WBC  15.2 H 


 


RBC  4.40 


 


Hgb  13.6 L 


 


Hct  41.4 L 


 


MCV  94.1 


 


MCH  30.9 


 


MCHC  32.9 


 


RDW  14.3 


 


Plt Count  246 


 


MPV  10.4 


 


Sodium   151 H


 


Potassium   3.6


 


Chloride   110 H


 


Carbon Dioxide   28


 


Anion Gap   16


 


BUN   19


 


Creatinine   0.9


 


Est GFR ( Amer)   > 60


 


Est GFR (Non-Af Amer)   > 60


 


Random Glucose   144 H


 


Calcium   8.7


 


Total Bilirubin   2.0 H


 


AST   45


 


ALT   72 H


 


Alkaline Phosphatase   85


 


Total Protein   6.4


 


Albumin   3.5


 


Globulin   2.9


 


Albumin/Globulin Ratio   1.2


 


Procalcitonin  














Assessment & Plan





- Assessment and Plan (Free Text)


Assessment: 





82 year old male with history of HTN ,COPD who was admitted with bilateral 

subdural hematoma's, COPD exacerbation,AMS, hypernatremia, leukocytosis and 

delirium





The patient is in bed alternating between restlessness and lethargy. The is 

patient is not able to engage in conversation during this visit. 





Patients wife Massiel at bedside. Palliative services introduced. When asked if 

patient had an advanced directive,Massiel states not that she is aware of. Massiel 

explained that she is his second wife. Massiel states they have never discussed 

these things. I explained the relevance of planning ahead, specifically with 

resuscitation wishes. Massiel states that his children from previous marriage 

should have some input. I offered to meet with family.  Masisel states that this 

is "not good timing". She explained that patient's previous spouse in not well 

and that his children are dealing with her issues at this time. Reinforced the 

benefits of advance care planning. Psychosocial support provided





Time spent with patients wife Massiel in goals of care and advance care planning 

discussion, 20 minutes


Plan: 





Pulmonology, Cardiology, Psychiatry, ID and Neurology reports reviewed and 

appreciated.





Leukocytosiss: Chest x ray negative,ID recommendations


 


Hypernatremia: Avoid saline IVF, continue D% IVF, daily chemistry panel, Renal 

recommendations





COPD: Continue nebulizer as ordered





Delirium: Continue Risperdal, Geodon. Psych recommendations





Goals of care and advance care planning

## 2018-05-08 NOTE — PN
DATE:  05/08/2018



SUBJECTIVE:  Shortly, the patient is 82-year-old  male with

multiple medical issues including delirium, leukocytosis, bilateral

subdural hematoma, anemia, visual impairment, dyslipidemia, hypothyroidism,

hearing impairment.  This writer was involved into the patient's care

because of delirium stage, was seems to be hyperactive.  This writer

implemented Ativan as well as Risperdal for agitated and aggressive

behavior and the patient was trying to climb off the bed.  This writer

increased the dose of Ativan yesterday as well as Risperdal.  The patient

is followed up today.  The patient appears to be much comfortable to

compare with the past couple of days.  The patient is resting comfortably

in his bed.  There is no agitation or aggression.  Today, the patient's

wife Alvina is next to the patient and this writer had prolonged

conversation with her, phone number [phone].  As per wife, the patient

was doing fine up until recently when the patient started to have altered

mental status.  For the past week, the patient was not doing well.  On

Sunday as well as today on Tuesday, the patient had improvement with his

presentation based on the patient's wife.  As per the patient's wife, the

patient was able to have a meaningful conversation with her earlier today,

was able to recognize her voice and also told her that he is doing better. 

The patient's wife was educated about the treatment plan as well as

medication what the patient was prescribed for the past week.  The

patient's wife was appreciative.  Based on the history, the patient has

alcohol abuse, but wife denied that the patient was drinking recently. 

Last drink as per wife was more than 5 years ago.  This writer reviewed

vital signs.  Temperature 99.2, yesterday the patient had spike of fever. 

Today, pulse is 110, blood pressure 123/66, respiration 20, oxygen

saturation is 96.  Medications reviewed.  The patient is on Tylenol,

DuoNeb, Lipitor, Pulmicort, Catapres, dextrose, fluconazole, hydralazine,

Synthroid, Ativan 1 mg IV push every 6 hours p.r.n. as well as IV push

three times a day scheduled.  Protonix and Risperdal 0.5 mg twice a day as

needed.  Geodon only if needed, last time it was on 05/04/2018.  Labs

reviewed.  The patient still has leukocytosis of 15.2, hemoglobin is 13.6,

hematocrit 41.4.  Chemistry reviewed.  Sodium 154, chloride 110.  AST and

ALT are trending down.  Urinalysis showed blood small.  Toxicology and

serology negative.



MENTAL STATUS EXAMINATION:  The patient presented much calmer today, less

anxious.  The patient is very hard of hearing.  As per wife, hearing aid is

in repair right now.  As per wife, the patient was able to recognize her as

well as told her that he feels better.  There are no signs of agitation or

aggression or psychosis.



IMPRESSION:  Multifactorial hyperactive delirium, which seems to be

improving; electrolyte imbalance, also leukocytosis, spike of fever.  The

patient also has visual and hearing impaired.



PLAN:  Continue current management.  Continue current medications.  We will

keep all of the medications as it is.  If the patient tolerate that well,

probably we will start tapering down Risperdal and Ativan.  Treatment plan

was discussed in details with the patient's wife, Alvina, phone number

[phone].



Thank you very much for letting me participate in the care of your patient.

Should you have any questions, give me a call back.





__________________________________________

Josiane Oneill MD





DD:  05/08/2018 11:29:25

DT:  05/08/2018 11:35:09

Job # [id number]

## 2018-05-08 NOTE — PN
DATE:  05/08/2018



SUBJECTIVE:  The patient is confused.



PHYSICAL EXAMINATION:

VITAL SIGNS:  Temperature is 98.9, pulse of 114, blood pressure 139/84,

respirations 22.

GENERAL:  The patient is lying in bed, flat, comfortable.

HEENT:  No oral lesion.  Anicteric sclerae.  Moist mucosa.

NECK:  No JVD, adenopathy, or thyromegaly.

CARDIOVASCULAR:  S1 and S2, regular.  No murmurs, rubs, or gallops.

LUNGS:  Clear to auscultation bilaterally.  No wheeze, rales, or rhonchi.

ABDOMEN:  Bowel sounds are positive, soft, nontender and nondistended.

EXTREMITIES:  No cyanosis, clubbing or edema.



LABS:  White count of 16.5.  Sodium is 154.

Chest x-ray shows no active disease.



ASSESSMENT:

1.  Leukocytosis.

2.  Delirium.

3.  Bilateral subdural hematoma.

4.  Anemia, multifactorial.

5.  Visual impairment.

6.  Dyslipidemia.

7.  Hypothyroidism.

8.  Hearing impairment.



PLAN:  The patient had fever, low grade last night.  He also has white

count that has been elevated.  I did order a separate workup.  The

patient's white count remained elevated.  He also is hypernatremic most

likely secondary to the fever and loss of fluid.  He was placed on D5

water.  The patient is on Ativan as needed.  He is on fluconazole for

possible fungal infection.  The patient is on Geodon as needed for his

delirium.  He is going to continue with Protonix.  He is on a heart-healthy

diet.  He is on Tylenol for his fevers.  We will continue to follow closely

_____ palliative care to evaluate the patient.  The patient _____.





__________________________________________

Sebastian Tolentino MD







DD:  05/08/2018 6:07:52

DT:  05/08/2018 10:48:13

Job # [id number]

## 2018-05-08 NOTE — CON
DATE:  05/08/2018



CONSULTATION IN GASTROENTEROLOGY



REQUESTING PHYSICIAN:  Sebastian Tolentino MD.



REASON FOR CONSULT:  I have been asked to see this 82-year-old male, who

was admitted to the hospital with cough, shortness of breath, wheezing,

headache and subsequently on CT scan of the head, who was found to have

bilateral subdural hematomas.  Neurosurgical evaluation did not feel that

any drainage procedure or surgical intervention was needed.  I have been

asked to see this patient for oropharyngeal dysphagia.  One week ago, the

patient did not have any swallowing issues.  Over the last several days,

the patient apparently has had retention of food in his mouth with delayed

emptying of the mouth.  The patient has had changes in his mental status

from delirium to restlessness to confusion.



PAST MEDICAL HISTORY:  Notable for COPD, hypertension, hypothyroidism,

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

cataract, hearing loss, coronary artery disease, hyperlipidemia.



PAST SURGICAL HISTORY:  Unremarkable.



SOCIAL HISTORY:  He is a former heavy cigarette smoker.  He is a former

alcohol abuser.



REVIEW OF SYSTEMS:  Fourteen-point review of systems is notable for

shortness of breath, cough, wheezing, hearing loss and blindness in his

right eye.



PHYSICAL EXAMINATION:

GENERAL:  Elderly male, lying in bed, confused, does not respond to verbal

stimuli, does respond to painful stimuli.

VITAL SIGNS:  Reveal temperature of 99.2, blood pressure 123/66, heart rate

of 110.

HEENT:  Revealed his right eye to be blind.  He has decreased vision in his

left eye.  He is hard-of-hearing.  Conjunctivae are pink.

NECK:  Supple.

CHEST:  Reveal scattered wheezing.

HEART:  Reveals a regular rate and rhythm.

ABDOMEN:  Soft, nontender.

EXTREMITIES:  Show no edema.



LABORATORY DATA:  Reveals white blood cell count 15.2, hemoglobin 13.6. 

Laboratory data reveals sodium 151, chloride 110, blood sugar 144.  Serum

albumin is 3.5, procalcitonin is 0.5.



IMPRESSION:  An 82-year-old male with bilateral subdural hematoma with

multiple comorbidities including chronic obstructive pulmonary disease,

coronary artery disease, delirium with delayed emptying of food from his

mouth.  The patient has oropharyngeal dysphagia.  This is most likely

secondary to his subdural hematomas.  His long-term prognosis is extremely

poor.



RECOMMENDATIONS:

1.  Await swallowing evaluation.

2.  Agree with Palliative Care evaluation.





__________________________________________

Aristeo Lanier MD





DD:  05/08/2018 13:41:35

DT:  05/08/2018 13:47:26

[facility] # [id number]

## 2018-05-09 VITALS — RESPIRATION RATE: 20 BRPM

## 2018-05-09 LAB
ALBUMIN SERPL-MCNC: 3.4 G/DL (ref 3–4.8)
ALBUMIN/GLOB SERPL: 1.1 {RATIO} (ref 1.1–1.8)
ALT SERPL-CCNC: 66 U/L (ref 7–56)
AST SERPL-CCNC: 51 U/L (ref 17–59)
BUN SERPL-MCNC: 16 MG/DL (ref 7–21)
CALCIUM SERPL-MCNC: 8.7 MG/DL (ref 8.4–10.5)
ERYTHROCYTE [DISTWIDTH] IN BLOOD BY AUTOMATED COUNT: 13.9 % (ref 11.5–14.5)
GFR NON-AFRICAN AMERICAN: > 60
HGB BLD-MCNC: 12.7 G/DL (ref 14–18)
MCH RBC QN AUTO: 31.1 PG (ref 25–35)
MCHC RBC AUTO-ENTMCNC: 33.7 G/DL (ref 31–37)
MCV RBC AUTO: 92.2 FL (ref 80–105)
PLATELET # BLD: 237 10^3/UL (ref 120–450)
PMV BLD AUTO: 10.2 FL (ref 7–11)
RBC # BLD AUTO: 4.09 10^6/UL (ref 3.5–6.1)
WBC # BLD AUTO: 15.1 10^3/UL (ref 4.5–11)

## 2018-05-09 RX ADMIN — BRIMONIDINE TARTRATE SCH DROP: 1.5 SOLUTION/ DROPS OPHTHALMIC at 13:39

## 2018-05-09 RX ADMIN — NYSTATIN SCH ML: 100000 SUSPENSION ORAL at 13:38

## 2018-05-09 RX ADMIN — IPRATROPIUM BROMIDE AND ALBUTEROL SULFATE SCH ML: .5; 3 SOLUTION RESPIRATORY (INHALATION) at 20:30

## 2018-05-09 RX ADMIN — BUDESONIDE SCH MG: 0.25 SUSPENSION RESPIRATORY (INHALATION) at 14:03

## 2018-05-09 RX ADMIN — NYSTATIN SCH: 100000 SUSPENSION ORAL at 17:53

## 2018-05-09 RX ADMIN — IPRATROPIUM BROMIDE AND ALBUTEROL SULFATE SCH ML: .5; 3 SOLUTION RESPIRATORY (INHALATION) at 14:03

## 2018-05-09 RX ADMIN — BRIMONIDINE TARTRATE SCH DROP: 1.5 SOLUTION/ DROPS OPHTHALMIC at 21:33

## 2018-05-09 RX ADMIN — IPRATROPIUM BROMIDE AND ALBUTEROL SULFATE SCH ML: .5; 3 SOLUTION RESPIRATORY (INHALATION) at 07:28

## 2018-05-09 RX ADMIN — BRIMONIDINE TARTRATE SCH: 1.5 SOLUTION/ DROPS OPHTHALMIC at 21:33

## 2018-05-09 RX ADMIN — BUDESONIDE SCH MG: 0.25 SUSPENSION RESPIRATORY (INHALATION) at 20:30

## 2018-05-09 RX ADMIN — LEVOTHYROXINE SODIUM ANHYDROUS SCH MCG: 100 INJECTION, POWDER, LYOPHILIZED, FOR SOLUTION INTRAVENOUS at 10:12

## 2018-05-09 RX ADMIN — FLUCONAZOLE SCH MLS/HR: 200 INJECTION, SOLUTION INTRAVENOUS at 10:11

## 2018-05-09 RX ADMIN — IPRATROPIUM BROMIDE AND ALBUTEROL SULFATE SCH ML: .5; 3 SOLUTION RESPIRATORY (INHALATION) at 02:40

## 2018-05-09 NOTE — PN
DATE:  05/09/2018



SUBJECTIVE:  The patient has no complaints of any chest pain.  The patient

is comfortable.  He can follow simple commands.  He does have episodes of

confusion.  He is not awake or alert and it is difficult for him to answer

questions.



PHYSICAL EXAMINATION:

VITAL SIGNS:  Temperature is 98, pulse of 100, blood pressure is 97/59,

respirations 20.

GENERAL:  The patient is lying in bed, flat, comfortable.

HEENT:  No oral lesion.  Anicteric sclerae.  Moist mucosa.

NECK:  No JVD, adenopathy, or thyromegaly.

CARDIOVASCULAR:  S1 and S2, regular.  No murmurs, rubs, or gallops.

LUNGS:  Clear to auscultation bilaterally.  No wheeze, rales, or rhonchi.

ABDOMEN:  Bowel sounds are positive.  Soft, nontender and nondistended.

EXTREMITIES:  No cyanosis, clubbing or edema.



LABORATORY DATA:  White count of 15.2, hemoglobin 13.6.  Sodium is 151.



ASSESSMENT:

1.  Leukocytosis.

2.  Delirium.

3.  Bilateral subdural hematoma.

4.  Anemia, multifactorial.

5.  Visual impairment.

6.  Dyslipidemia.

7.  Hypothyroidism.

8.  Hearing impairment.



PLAN:  The patient has still episodes of confusion.  He is not able to

_____ because of decrease in oral fluid intake; had second episode of

hypernatremia that required free water.  He also had _____ he had been able

to come off one-to-one, but still requires sedation at times, _____

advanced directives and it has been discussed by _____ Palliative Care. 

Overall prognosis is poor.  The patient has not been improved enough to eat

and drink adequately since his recent admission into the hospital about 11

days ago.







__________________________________________

Sebastian Tolentino MD



DD:  05/09/2018 6:05:33

DT:  05/09/2018 7:38:05

Job # [id number]

## 2018-05-09 NOTE — PN
FOLLOWUP NOTE



DATE:  05/09/2018



SUBJECTIVE:  Shortly, the patient is an 82-year-old  male,

multiple medical issues including delirium, leukocytosis, bilateral

subdural hematoma, anemia, visual impairment, dyslipidemia, hypothyroidism,

hearing impairment.  The patient was seen by this writer because of

delirium stage, hallucinations, agitated and restless behavior.  The

patient currently is on Ativan as well as Risperdal which the patient

tolerated really well.  This writer had prolonged conversation with the

patient's wife Alvina yesterday as well as today.  The patient was seen

and examined today, discussed with the patient's wife.  The patient

presented very well today.  No restless or agitated behavior.  The patient

was able to have meaningful conversation.  The patient knows that he is in

the hospital.  The patient was observed making jokes and smiling with his

wife.  As per the patient, he had a good night sleep.  The patient also had

increased appetite, asked his wife to bring him some food.  From the

medical perspective, the patient is still tachycardiac, 100 of pulse,

temperature 98, blood pressure 97/59.  Medications reviewed, lorazepam 1 mg

3 times a day scheduled, but the patient got only 2 times a day, Risperdal

0.5 mg twice a day as well as Geodon 10 mg every 8 hours as needed, last

injection was given on 05/04/2018.  The patient has stable improvement in

regard to his mental status.  The patient still has leukocytosis 15.1,

hemoglobin and hematocrit 12.7 and 37.7 respectively.  Chemistry reviewed. 

Sodium is better controlled right now, 145.  The rest seems to be within

normal limits.  AST 51, ALT 66.  The patient was evaluated by Palliative

Care.  Advance directives were discussed with the patient and wife.  Please

see notes for more detailed information.  The patient was seen by

Infectious Disease.  As per Infectious Disease, the patient has systemic

inflammatory response syndrome, increasing with leukocytosis, probably from

subdural hematoma.  The patient is off antibiotics.



MENTAL STATUS EXAMINATION:  The patient appears to be alert.  The patient

knows that he is in the hospital.  The patient has hearing impairment and

vision impairment.  The patient was able to recognize his wife, was

observed making jokes with her.  Thought process, at times the patient

appears to be confused but more comfortable.  Thought process is more

organized.  Thought content, no visual, auditory, or tactile hallucinations

reported.  The patient denied any thoughts of harming himself or others. 

Insight and judgment seem to be limited right now due to delirium stage. 

Impulses are better controlled.



IMPRESSION:  Delirium, seems to be improving.



PLAN:  Continue Ativan.  We will start tapering the dose.  Risperdal also

will be given, decrease dose today, after that it will be p.r.n.  Discussed

treatment plan with the patient's wife.  The patient was seen by Infectious

Disease as well as Palliative Care.  We will follow up and advise

accordingly.



Thank you very much for letting me participate in care of your patient.





__________________________________________

Josiane Oneill MD



DD:  05/09/2018 9:50:25

DT:  05/09/2018 9:55:40

Job # [id number]

## 2018-05-09 NOTE — CP.PCM.PN
Subjective





- Date & Time of Evaluation


Date of Evaluation: 05/09/18


Time of Evaluation: 10:00





- Subjective


Subjective: 


Afebrile, not in distress.





Objective





- Vital Signs/Intake and Output


Vital Signs (last 24 hours): 


 











Temp Pulse Resp BP Pulse Ox


 


 98 F   100 H  20   97/59 L  97 


 


 05/08/18 16:49  05/08/18 16:49  05/08/18 16:49  05/08/18 16:49  05/08/18 16:49








Intake and Output: 


 











 05/09/18 05/09/18





 06:59 18:59


 


Intake Total 320 


 


Output Total 700 


 


Balance -380 














- Medications


Medications: 


 Current Medications





Acetaminophen (Tylenol 650 Mg Supp)  650 mg RC Q4H PRN


   PRN Reason: Fever >100.4 F


Albuterol/Ipratropium (Duoneb 3 Mg/0.5 Mg (3 Ml) Ud)  3 ml IH [id number] [facility]


   Last Admin: 05/09/18 07:28 Dose:  3 ml


Albuterol/Ipratropium (Duoneb 3 Mg/0.5 Mg (3 Ml) Ud)  3 ml IH [id number] PRN


   PRN Reason: Shortness of Breath


   Last Admin: 05/04/18 18:45 Dose:  3 ml


Atorvastatin Calcium (Lipitor)  20 mg PO DAILY [facility]


   Last Admin: 05/09/18 09:32 Dose:  Not Given


Brimonidine Tartrate (Alphagan P 0.15% Opht)  0 drop OS Q8H [facility]


   Last Admin: 05/08/18 21:51 Dose:  1 drop


Budesonide (Pulmicort Respules)  0.25 mg IH [id number] [facility]


   Last Admin: 05/08/18 21:05 Dose:  0.25 mg


Clonidine HCl (Catapres-Tts2 0.2 Mg/24 Hr)  1 patch TD Q7D@1000 [facility]


   Last Admin: 05/08/18 10:42 Dose:  1 patch


Home Med (Home Med)  1 unit OS HS [facility]


   Last Admin: 05/08/18 21:51 Dose:  Not Given


Home Med (Home Med)  1 unit PO HS [facility]


   Last Admin: 05/08/18 21:52 Dose:  1 unit


Hydralazine HCl (Apresoline)  10 mg IVP Q6 PRN


   PRN Reason: for SBP>160


   Last Admin: 05/04/18 09:05 Dose:  10 mg


Fluconazole 100 mg/ (Miscellaneous)  50 mls @ 100 mls/hr IVPB DAILY [facility]


   PRN Reason: Protocol


   Last Admin: 05/09/18 10:11 Dose:  100 mls/hr


Dextrose (Dextrose 5% In Water 1000 Ml)  1,000 mls @ 100 mls/hr IV .Q10H [facility]


   Last Admin: 05/09/18 10:15 Dose:  100 mls/hr


Potassium Chloride (Potassium Chloride 20 Meq/100 Ml)  20 meq in 100 mls @ 50 

mls/hr IVPB ONCE ONE


   Stop: 05/09/18 11:47


   Last Admin: 05/09/18 10:11 Dose:  50 mls/hr


Levothyroxine Sodium (Synthroid)  100 mcg IVP DAILY [facility]


   Last Admin: 05/09/18 10:12 Dose:  100 mcg


Lorazepam (Ativan)  1 mg IVP Q6H PRN; Protocol


   PRN Reason: restlessness and agitation


   Last Admin: 05/08/18 00:11 Dose:  1 mg


Lorazepam (Ativan)  0.5 mg IVP QID [facility]


   PRN Reason: Protocol


   Last Admin: 05/09/18 10:12 Dose:  Not Given


Nystatin (Nystatin Oral Susp)  5 ml PO QID [facility]


Pantoprazole Sodium (Protonix Inj)  40 mg IVP DAILY [facility]


   Last Admin: 05/09/18 10:12 Dose:  40 mg


Risperidone (Risperdal Oral Soln)  0.5 mg PO HS [facility]


   PRN Reason: Protocol











- Labs


Labs: 


 





 05/09/18 06:00 





 05/09/18 06:00 





 











PT  13.3 SECONDS (9.4-12.5)  H  04/28/18  11:39    


 


INR  1.15  (0.93-1.08)  H  04/28/18  11:39    


 


APTT  32.6 Seconds (25.1-36.5)   04/28/18  11:39    














- Constitutional


Appears: Chronically Ill





- Head Exam


Head Exam: NORMAL INSPECTION





- Respiratory Exam


Respiratory Exam: Decreased Breath Sounds





- Cardiovascular Exam


Cardiovascular Exam: +S1, +S2





- GI/Abdominal Exam


GI & Abdominal Exam: Soft.  absent: Tenderness





Assessment and Plan





- Assessment and Plan (Free Text)


Plan: 





Assessment


Systemic Inflammatory response syndrome, with leukocytosis, probably from 

subdural hematoma in the head, no evidence of nosocomial infection/sepsis


COPD


deafness using hearing aides


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


history of alcoholism





Plan


continue to monitor off antibiotics and trend WBC count - repeat blood cx are 

negative as well as urine cx, repeat PCT is <0.05; reviewed repeat CXR which 

does not show infiltrates

## 2018-05-09 NOTE — PN
DATE:  05/09/2018



SUBJECTIVE:  The patient is lying in bed.  He does not respond to verbal

stimuli.  According to nursing, the patient had episodes of delirium last

night where he required restraints.  The patient had a swallowing

evaluation yesterday which revealed moderate oropharyngeal dysphagia. 

Recommendations were to have the patient on pureed diet with honey

thickened liquids with close observation for aspiration.



PHYSICAL EXAMINATION:

VITAL SIGNS:  Reveal temperature of 98, blood pressure 97/59, heart rate of

100.

HEENT:  Reveal that he is blind in his right eye with decreased visual

acuity in his left eye.

NECK:  Supple.

CHEST:  Reveal distant breath sounds.

HEART:  Reveals a regular rate and rhythm.

ABDOMEN:  Soft, flabby, nontender.

EXTREMITIES:  Show no edema.



LABORATORY DATA:  Reveal white blood cell count 15.1, hemoglobin 12.7. 

Electrolytes are normal.  Blood sugars 146.



IMPRESSION:  An 82-year-old male with altered mental status, delirium,

history of alcohol use in the past with oropharyngeal dysphagia.  Given the

patient's delirium and periods of agitation, he is not a candidate for

percutaneous endoscopic gastrostomy as there is a high risk of him removing

the percutaneous endoscopic gastrostomy causing injury to the abdominal

wall.



RECOMMENDATIONS:

1.  Palliative care evaluation.

2.  Full liquid diet with aspiration precautions.





__________________________________________

Aristeo Lanier MD



DD:  05/09/2018 10:18:31

DT:  05/09/2018 10:24:20

Job # [id number]

## 2018-05-09 NOTE — CP.PCM.PN
Subjective





- Date & Time of Evaluation


Date of Evaluation: 05/09/18


Time of Evaluation: 06:45





- Subjective


Subjective: 





In bed,restless, trying to pull [place] catheter, open eyes to name calling,no 

distress,





Reason for consult and follow up: Cardiac evaluation, Arrythmia, hypertension,

subdural hematoma,post fall, altered mental status, hypercholesterolemia





Seen and examined by me and Dr. Judge





Objective





- Vital Signs/Intake and Output


Vital Signs (last 24 hours): 


 











Temp Pulse Resp BP Pulse Ox


 


 98 F   100 H  20   97/59 L  97 


 


 05/08/18 16:49  05/08/18 16:49  05/08/18 16:49  05/08/18 16:49  05/08/18 16:49








Intake and Output: 


 











 05/09/18 05/09/18





 06:59 18:59


 


Intake Total 320 


 


Output Total 700 


 


Balance -380 














- Medications


Medications: 


 Current Medications





Acetaminophen (Tylenol 650 Mg Supp)  650 mg RC Q4H PRN


   PRN Reason: Fever >100.4 F


Albuterol/Ipratropium (Duoneb 3 Mg/0.5 Mg (3 Ml) Ud)  3 ml IH [id number] [facility]


   Last Admin: 05/09/18 07:28 Dose:  3 ml


Albuterol/Ipratropium (Duoneb 3 Mg/0.5 Mg (3 Ml) Ud)  3 ml IH [id number] PRN


   PRN Reason: Shortness of Breath


   Last Admin: 05/04/18 18:45 Dose:  3 ml


Atorvastatin Calcium (Lipitor)  20 mg PO DAILY [facility]


   Last Admin: 05/08/18 10:11 Dose:  Not Given


Brimonidine Tartrate (Alphagan P 0.15% Opht)  0 drop OS Q8H [facility]


   Last Admin: 05/08/18 21:51 Dose:  1 drop


Budesonide (Pulmicort Respules)  0.25 mg IH [id number] [facility]


   Last Admin: 05/08/18 21:05 Dose:  0.25 mg


Clonidine HCl (Catapres-Tts2 0.2 Mg/24 Hr)  1 patch TD Q7D@1000 [facility]


   Last Admin: 05/08/18 10:42 Dose:  1 patch


Home Med (Home Med)  1 unit OS [facility]


   Last Admin: 05/08/18 21:51 Dose:  Not Given


Home Med (Home Med)  1 unit PO HS [facility]


   Last Admin: 05/08/18 21:52 Dose:  1 unit


Hydralazine HCl (Apresoline)  10 mg IVP Q6 PRN


   PRN Reason: for SBP>160


   Last Admin: 05/04/18 09:05 Dose:  10 mg


Fluconazole 100 mg/ (Miscellaneous)  50 mls @ 100 mls/hr IVPB DAILY [facility]


   PRN Reason: Protocol


   Last Admin: 05/08/18 10:02 Dose:  100 mls/hr


Dextrose (Dextrose 5% In Water 1000 Ml)  1,000 mls @ 100 mls/hr IV .Q10H [facility]


   Last Admin: 05/08/18 21:51 Dose:  100 mls/hr


Levothyroxine Sodium (Synthroid)  100 mcg IVP DAILY [facility]


Lorazepam (Ativan)  1 mg IVP Q6H PRN; Protocol


   PRN Reason: restlessness and agitation


   Last Admin: 05/08/18 00:11 Dose:  1 mg


Lorazepam (Ativan)  1 mg IVP TID [facility]


   PRN Reason: Protocol


   Last Admin: 05/08/18 18:36 Dose:  Not Given


Pantoprazole Sodium (Protonix Inj)  40 mg IVP DAILY [facility]


   Last Admin: 05/08/18 10:10 Dose:  40 mg


Risperidone (Risperdal Oral Soln)  0.5 mg PO AMHS [facility]


   PRN Reason: Protocol


   Last Admin: 05/08/18 21:56 Dose:  0.5 mg


Ziprasidone (Geodon Inj)  10 mg IM Q8H PRN; Protocol


   PRN Reason: agitation/psychosis


   Last Admin: 05/04/18 19:50 Dose:  10 mg











- Labs


Labs: 


 





 05/09/18 06:00 





 05/09/18 06:00 





 











PT  13.3 SECONDS (9.4-12.5)  H  04/28/18  11:39    


 


INR  1.15  (0.93-1.08)  H  04/28/18  11:39    


 


APTT  32.6 Seconds (25.1-36.5)   04/28/18  11:39    














- Constitutional


Appears: No Acute Distress





- Head Exam


Head Exam: NORMOCEPHALIC





- ENT Exam


ENT Exam: Mucous Membranes Dry





- Respiratory Exam


Respiratory Exam: Decreased Breath Sounds, NORMAL BREATHING PATTERN


Additional comments: 





nasal cannula 2-3l/min





- Cardiovascular Exam


Cardiovascular Exam: +S1, +S2





- GI/Abdominal Exam


GI & Abdominal Exam: Soft, Normal Bowel Sounds





-  Exam


Additional comments: 





incontinent, [place] catheter





- Extremities Exam


Extremities Exam: Normal Capillary Refill





- Neurological Exam


Additional comments: 





lethargic but opens eyes to name calling





- Skin


Skin Exam: Intact, Normal Color, Warm





Assessment and Plan





- Assessment and Plan (Free Text)


Assessment: 





82 year old who came into the ER due to complaints of shortness of breath,

wheezing,headache. Wife gave him tylenol with no relief and seemed to be 

confuse. History of hypertension, hypercholesterolemia, hypothyroidism,COPD, 

deafness, history of fall 2 weeks ago prior to admission. CT of head showed 

small bilateral subdural hematoma. no aggressive cardiac work up at this point 

until neuro status stabilized.






































Plan: 


Evaluated by speech therapist and recommended pureed with honey consistency food


Aspiration precaution


Evaluated by social service for subacute rehab placement,discussed with wife 

and preferred


 [facility]


IV fluid at 40cc/hr


On Catapres patch daily,Hydralazine 10 mg IV PRN.


  Synthroid 125mcg IVP daily and Ativan IV PRN


Stable BP and heart rate


Cardiac status stable


Continue current medications


Continue current treatment


Fall precaution


Will follow up





Plan and treatment discussed with Dr. Judge

## 2018-05-10 VITALS
DIASTOLIC BLOOD PRESSURE: 80 MMHG | SYSTOLIC BLOOD PRESSURE: 110 MMHG | OXYGEN SATURATION: 96 % | HEART RATE: 87 BPM | TEMPERATURE: 98.4 F

## 2018-05-10 RX ADMIN — LEVOTHYROXINE SODIUM ANHYDROUS SCH MCG: 100 INJECTION, POWDER, LYOPHILIZED, FOR SOLUTION INTRAVENOUS at 10:35

## 2018-05-10 RX ADMIN — FLUCONAZOLE SCH MLS/HR: 200 INJECTION, SOLUTION INTRAVENOUS at 10:32

## 2018-05-10 RX ADMIN — IPRATROPIUM BROMIDE AND ALBUTEROL SULFATE SCH ML: .5; 3 SOLUTION RESPIRATORY (INHALATION) at 08:00

## 2018-05-10 RX ADMIN — NYSTATIN SCH: 100000 SUSPENSION ORAL at 10:33

## 2018-05-10 RX ADMIN — IPRATROPIUM BROMIDE AND ALBUTEROL SULFATE SCH ML: .5; 3 SOLUTION RESPIRATORY (INHALATION) at 02:30

## 2018-05-10 RX ADMIN — NYSTATIN SCH: 100000 SUSPENSION ORAL at 00:57

## 2018-05-10 NOTE — PN
DATE:  05/10/2018



SUBJECTIVE:  The patient is lying in bed.  He is somnolent at this time. 

He did have episodes of lucidity yesterday where he was able to take a

thick liquid diet.  His wife is at the bedside.  She was able to feed him

some thick liquids yesterday, but there is no evidence of aspiration.



PHYSICAL EXAMINATION:

VITAL SIGNS:  Reveal temperature of 98.4, blood pressure 110/70, heart rate

of 87.

HEENT:  Reveals the patient is blind in his right eye.  He is

hard-of-hearing.  Conjunctivae are pink.

NECK:  Supple.

CHEST:  Reveals scattered rhonchi.

HEART:  Reveals a regular rate and rhythm.

ABDOMEN:  Soft, nontender.

EXTREMITIES:  Show no edema.



LABORATORY DATA:  Revealed white blood cell count 15.1, hemoglobin 12.7. 

BUN 16, creatinine 0.8, sodium of 145.



IMPRESSION:

1.  Oropharyngeal dysphagia.

2.  Subdural hematomas.

3.  Altered mental status with delirium alternating with agitation and

lethargy.



RECOMMENDATIONS:  Continue to assist the patient with full liquid diet.  I

had a lengthy discussion with the patient's wife who was at the bedside who

does not want the patient to have a PEG at this time.  I agreed with this

desire.  The patient's long-term prognosis is poor and the patient's wife

will consider palliative care.





__________________________________________

Aristeo Lanier MD





DD:  05/10/2018 9:43:12

DT:  05/10/2018 9:46:30

Job # [id number]

## 2018-05-10 NOTE — PN
DATE:  05/10/2018



SUBJECTIVE:  The patient has been confused.  He does have episodes where he

is able to follow directions from the nursing staff.



PHYSICAL EXAMINATION:

VITAL SIGNS:  Temperature is 98.6, pulse of 102, blood pressure is 126/80,

respirations 20.

GENERAL:  The patient is lying in bed, flat, comfortable.

HEENT:  No oral lesion.  Anicteric sclerae.  Moist mucosa.

NECK:  No JVD, adenopathy, or thyromegaly.

CARDIOVASCULAR:  S1 and S2, regular.  No murmurs, rubs, or gallops.

LUNGS:  Clear to auscultation bilaterally.  No wheeze, rales, or rhonchi.

ABDOMEN:  Bowel sounds are positive, soft, nontender and nondistended.

EXTREMITIES:  No cyanosis, clubbing or edema.



LABORATORY DATA:  White count is 15.1.



ASSESSMENT:

1.  Leukocytosis.

2.  Delirium.

3.  Bilateral subdural hematoma.

4.  Anemia, multifactorial.

5.  Visual impairment.

6.  Dyslipidemia.

7.  Hypothyroidism.

8.  Hearing impairment.



PLAN:  The patient is currently comfortable.  He is eating and drinking.  I

still have concerns that he may become hypernatremic because of decreased

fluid intake.  I did have a long discussion with the patient's wife

yesterday.  The patient is awaiting for discharge to a subacute rehab

facility.  The patient's wife prefers [facility], but I do not think that

they will be accepting this patient.  The patient's wife is aware that I

will not be seeing the patient if he does not go to [facility] and not a

physician will be taking over.  The patient's overall prognosis is guarded.

At this point, the patient probably does not need a PEG.  

continues to work on discharge planning.  The patient is on clonidine

patch.  He is on IV fluids.  The patient is receiving Lipitor for

dyslipidemia.  He is on nystatin.  He is on Synthroid for hypothyroidism. 

I did advise the patient's wife to have the facility that the patient goes

to check his chemistry.



CONDITION:  Stable.



ACTIVITY:  Increased as tolerated.





__________________________________________

Sebastian Tolentino MD



DD:  05/10/2018 6:06:58

DT:  05/10/2018 7:44:38

[facility] # [id number]

## 2018-05-10 NOTE — CP.PCM.PN
Subjective





- Date & Time of Evaluation


Date of Evaluation: 05/10/18


Time of Evaluation: 07:10





- Subjective


Subjective: 





In bed,restless, no distress





Reason for consult and follow up: Cardiac evaluation, Arrythmia, hypertension,

subdural hematoma,post fall, altered mental status, hypercholesterolemia





Seen and examined by me and Dr. Meehan





Objective





- Vital Signs/Intake and Output


Vital Signs (last 24 hours): 


 











Temp Pulse Resp BP Pulse Ox


 


 98.6 F   102 H  20   126/80   95 


 


 05/09/18 18:00  05/09/18 18:00  05/09/18 18:00  05/09/18 18:00  05/09/18 18:00








Intake and Output: 


 











 05/10/18 05/10/18





 06:59 18:59


 


Intake Total 240 


 


Output Total 500 


 


Balance -260 














- Medications


Medications: 


 Current Medications





Acetaminophen (Tylenol 650 Mg Supp)  650 mg RC Q4H PRN


   PRN Reason: Fever >100.4 F


Albuterol/Ipratropium (Duoneb 3 Mg/0.5 Mg (3 Ml) Ud)  3 ml IH [id number] [facility]


   Last Admin: 05/10/18 08:00 Dose:  3 ml


Albuterol/Ipratropium (Duoneb 3 Mg/0.5 Mg (3 Ml) Ud)  3 ml IH [id number] PRN


   PRN Reason: Shortness of Breath


   Last Admin: 05/04/18 18:45 Dose:  3 ml


Atorvastatin Calcium (Lipitor)  20 mg PO DAILY [facility]


   Last Admin: 05/09/18 09:32 Dose:  Not Given


Brimonidine Tartrate (Alphagan P 0.15% Opht)  0 drop OS Q8H [facility]


   Last Admin: 05/09/18 21:33 Dose:  1 drop


Budesonide (Pulmicort Respules)  0.25 mg IH [id number] [facility]


   Last Admin: 05/09/18 20:30 Dose:  0.25 mg


Clonidine HCl (Catapres-Tts2 0.2 Mg/24 Hr)  1 patch TD Q7D@1000 [facility]


   Last Admin: 05/08/18 10:42 Dose:  1 patch


Home Med (Home Med)  1 unit OS [facility]


   Last Admin: 05/09/18 21:36 Dose:  1 unit


Home Med (Home Med)  1 unit PO HS [facility]


   Last Admin: 05/09/18 21:35 Dose:  1 unit


Hydralazine HCl (Apresoline)  10 mg IVP Q6 PRN


   PRN Reason: for SBP>160


   Last Admin: 05/04/18 09:05 Dose:  10 mg


Fluconazole 100 mg/ (Miscellaneous)  50 mls @ 100 mls/hr IVPB DAILY [facility]


   PRN Reason: Protocol


   Last Admin: 05/09/18 10:11 Dose:  100 mls/hr


Dextrose (Dextrose 5% In Water 1000 Ml)  1,000 mls @ 100 mls/hr IV .Q10H [facility]


   Last Admin: 05/09/18 21:34 Dose:  100 mls/hr


Levothyroxine Sodium (Synthroid)  100 mcg IVP DAILY [facility]


   Last Admin: 05/09/18 10:12 Dose:  100 mcg


Lorazepam (Ativan)  1 mg IVP Q6H PRN; Protocol


   PRN Reason: restlessness and agitation


   Last Admin: 05/10/18 03:18 Dose:  1 mg


Lorazepam (Ativan)  0.5 mg IVP QID [facility]


   PRN Reason: Protocol


   Last Admin: 05/09/18 21:32 Dose:  0.5 mg


Nystatin (Nystatin Oral Susp)  5 ml PO QID [facility]


   Last Admin: 05/10/18 00:57 Dose:  Not Given


Pantoprazole Sodium (Protonix Inj)  40 mg IVP DAILY [facility]


   Last Admin: 05/09/18 10:12 Dose:  40 mg


Risperidone (Risperdal Oral Soln)  0.5 mg PO HS [facility]


   PRN Reason: Protocol


   Last Admin: 05/09/18 22:25 Dose:  0.5 mg











- Labs


Labs: 


 





 05/09/18 06:00 





 05/09/18 06:00 





 











PT  13.3 SECONDS (9.4-12.5)  H  04/28/18  11:39    


 


INR  1.15  (0.93-1.08)  H  04/28/18  11:39    


 


APTT  32.6 Seconds (25.1-36.5)   04/28/18  11:39    














- Constitutional


Appears: No Acute Distress





- ENT Exam


ENT Exam: Mucous Membranes Dry





- Respiratory Exam


Respiratory Exam: Decreased Breath Sounds, NORMAL BREATHING PATTERN


Additional comments: 





nasal cannula 2-3 l/min





- Cardiovascular Exam


Cardiovascular Exam: +S1, +S2





- GI/Abdominal Exam


GI & Abdominal Exam: Soft, Normal Bowel Sounds





-  Exam


Additional comments: 





[place] catheter,incontinent





- Extremities Exam


Extremities Exam: Normal Capillary Refill





- Neurological Exam


Additional comments: 





lethargic,confuse,restless





- Skin


Skin Exam: Dry, Normal Color, Warm





Assessment and Plan





- Assessment and Plan (Free Text)


Assessment: 





An 82 year old who came into the ER due to complaints of shortness of breath,

wheezing,headache. Wife gave him tylenol with no relief and seemed to be 

confuse. History of hypertension, hypercholesterolemia, hypothyroidism,COPD, 

deafness, history of fall 2 weeks ago prior to admission. CT of head showed 

small bilateral subdural hematoma. no aggressive cardiac work up at this point 

until neuro status stabilized.









































Plan: 


Supportive care


Discharge planning. 


Social service and case management working on placement


On Catapres patch daily,Hydralazine 10 mg IV PRN.


  Synthroid 125mcg IVP daily and Ativan IV PRN


Stable BP and heart rate


Cardiac status stable


Continue current medications


Continue current treatment


Fall precaution


Will follow up





Plan and treatment discussed with Dr. Meehan.

## 2018-05-10 NOTE — CP.PCM.PCO
Physician Communication Note





- Physician Communication Note


Physician Communication Note: pt is improving, needs to be f/u with 

psychiatrist at [facility] q24hrs





Addendum


Addendum: 





05/10/18 13:28


pt is improving, needs to be f/u with psychiatrist at [facility] within 24hrs, to make 

sure mental status is improving and possible d/c risperdal


pt is not in any imminent danger to self or others

## 2018-11-26 ENCOUNTER — HOSPITAL ENCOUNTER (INPATIENT)
Dept: HOSPITAL 42 - ED | Age: 83
LOS: 7 days | DRG: 208 | End: 2018-12-03
Attending: INTERNAL MEDICINE | Admitting: INTERNAL MEDICINE
Payer: MEDICARE

## 2018-11-26 VITALS — BODY MASS INDEX: 25 KG/M2

## 2018-11-26 DIAGNOSIS — Z51.5: ICD-10-CM

## 2018-11-26 DIAGNOSIS — Z78.1: ICD-10-CM

## 2018-11-26 DIAGNOSIS — J44.9: ICD-10-CM

## 2018-11-26 DIAGNOSIS — R41.89: ICD-10-CM

## 2018-11-26 DIAGNOSIS — R50.9: ICD-10-CM

## 2018-11-26 DIAGNOSIS — Z87.891: ICD-10-CM

## 2018-11-26 DIAGNOSIS — Z98.49: ICD-10-CM

## 2018-11-26 DIAGNOSIS — Z95.5: ICD-10-CM

## 2018-11-26 DIAGNOSIS — E03.9: ICD-10-CM

## 2018-11-26 DIAGNOSIS — J44.0: ICD-10-CM

## 2018-11-26 DIAGNOSIS — F03.91: ICD-10-CM

## 2018-11-26 DIAGNOSIS — E78.5: ICD-10-CM

## 2018-11-26 DIAGNOSIS — R44.1: ICD-10-CM

## 2018-11-26 DIAGNOSIS — N19: ICD-10-CM

## 2018-11-26 DIAGNOSIS — F91.9: ICD-10-CM

## 2018-11-26 DIAGNOSIS — R44.0: ICD-10-CM

## 2018-11-26 DIAGNOSIS — G93.1: ICD-10-CM

## 2018-11-26 DIAGNOSIS — I47.2: ICD-10-CM

## 2018-11-26 DIAGNOSIS — R41.82: ICD-10-CM

## 2018-11-26 DIAGNOSIS — K44.9: ICD-10-CM

## 2018-11-26 DIAGNOSIS — I10: ICD-10-CM

## 2018-11-26 DIAGNOSIS — I46.9: ICD-10-CM

## 2018-11-26 DIAGNOSIS — H91.90: ICD-10-CM

## 2018-11-26 DIAGNOSIS — D64.9: ICD-10-CM

## 2018-11-26 DIAGNOSIS — G92: ICD-10-CM

## 2018-11-26 DIAGNOSIS — J18.1: Primary | ICD-10-CM

## 2018-11-26 DIAGNOSIS — E87.0: ICD-10-CM

## 2018-11-26 DIAGNOSIS — I25.10: ICD-10-CM

## 2018-11-26 DIAGNOSIS — I71.4: ICD-10-CM

## 2018-11-26 DIAGNOSIS — Z79.82: ICD-10-CM

## 2018-11-26 DIAGNOSIS — Z91.81: ICD-10-CM

## 2018-11-26 DIAGNOSIS — Z66: ICD-10-CM

## 2018-11-26 DIAGNOSIS — Z86.73: ICD-10-CM

## 2018-11-26 DIAGNOSIS — H54.61: ICD-10-CM

## 2018-11-26 DIAGNOSIS — I16.0: ICD-10-CM

## 2018-11-26 LAB
ALBUMIN SERPL-MCNC: 4.1 G/DL (ref 3–4.8)
ALBUMIN/GLOB SERPL: 1.3 {RATIO} (ref 1.1–1.8)
ALT SERPL-CCNC: 21 U/L (ref 7–56)
APPEARANCE UR: (no result)
APTT BLD: 32.3 SECONDS (ref 25.1–36.5)
ARTERIAL BLOOD GAS HEMOGLOBIN: 14.6 G/DL (ref 11.7–17.4)
ARTERIAL BLOOD GAS O2 SAT: 95.7 % (ref 95–98)
ARTERIAL BLOOD GAS PCO2: 33 MM/HG (ref 35–45)
ARTERIAL BLOOD GAS TCO2: 25 MMOL.L (ref 22–28)
AST SERPL-CCNC: 21 U/L (ref 17–59)
BASOPHILS # BLD AUTO: 0.01 K/MM3 (ref 0–2)
BASOPHILS NFR BLD: 0.1 % (ref 0–3)
BILIRUB UR-MCNC: NEGATIVE MG/DL
BUN SERPL-MCNC: 12 MG/DL (ref 7–21)
CALCIUM SERPL-MCNC: 9.6 MG/DL (ref 8.4–10.5)
COLOR UR: YELLOW
EOSINOPHIL # BLD: 0.1 10*3/UL (ref 0–0.7)
EOSINOPHIL NFR BLD: 1.1 % (ref 1.5–5)
ERYTHROCYTE [DISTWIDTH] IN BLOOD BY AUTOMATED COUNT: 13.6 % (ref 11.5–14.5)
GFR NON-AFRICAN AMERICAN: > 60
GLUCOSE UR STRIP-MCNC: NEGATIVE MG/DL
GRANULOCYTES # BLD: 8.37 10*3/UL (ref 1.4–6.5)
GRANULOCYTES NFR BLD: 72.6 % (ref 50–68)
HCO3 BLDA-SCNC: 24 MMOL/L (ref 21–28)
HGB BLD-MCNC: 14.5 G/DL (ref 14–18)
INHALED O2 CONCENTRATION: 21 %
INR PPP: 1.22
LEUKOCYTE ESTERASE UR-ACNC: NEGATIVE LEU/UL
LYMPHOCYTES # BLD: 1.8 10*3/UL (ref 1.2–3.4)
LYMPHOCYTES NFR BLD AUTO: 15.5 % (ref 22–35)
MCH RBC QN AUTO: 31.6 PG (ref 25–35)
MCHC RBC AUTO-ENTMCNC: 34.4 G/DL (ref 31–37)
MCV RBC AUTO: 91.9 FL (ref 80–105)
MONOCYTES # BLD AUTO: 1.2 10*3/UL (ref 0.1–0.6)
MONOCYTES NFR BLD: 10.7 % (ref 1–6)
O2 CAP BLDA-SCNC: 19.9 ML/DL (ref 16–24)
O2 CT BLDA-SCNC: 19 ML/DL (ref 15–23)
PH BLDA: 7.47 [PH] (ref 7.35–7.45)
PH UR STRIP: 6 [PH] (ref 4.7–8)
PLATELET # BLD: 221 10^3/UL (ref 120–450)
PMV BLD AUTO: 9.9 FL (ref 7–11)
PO2 BLDA: 66 MM/HG (ref 80–100)
PROT UR STRIP-MCNC: (no result) MG/DL
PROTHROMBIN TIME: 14 SECONDS (ref 9.4–12.5)
RBC # BLD AUTO: 4.59 10^6/UL (ref 3.5–6.1)
RBC # UR STRIP: (no result) /UL
RBC #/AREA URNS HPF: (no result) /HPF (ref 0–2)
SP GR UR STRIP: 1.01 (ref 1–1.03)
UROBILINOGEN UR STRIP-ACNC: 0.2 E.U./DL
WBC # BLD AUTO: 11.5 10^3/UL (ref 4.5–11)
WBC #/AREA URNS HPF: (no result) /HPF (ref 0–6)

## 2018-11-26 NOTE — CT
Date of service: 



11/26/2018



PROCEDURE:  CT Abdomen and Pelvis without intravenous contrast



HISTORY:

stone



COMPARISON:

None.



TECHNIQUE:

Axial and reformatted coronal and sagittal CT images of the abdomen 

and pelvis were obtained without IV or oral contrast administration. 



Contrast dose: 0



Radiation dose:



Total exam DLP = 981.08 mGy-cm.



This CT exam was performed using one or more of the following dose 

reduction techniques: Automated exposure control, adjustment of the 

mA and/or kV according to patient size, and/or use of iterative 

reconstruction technique.



FINDINGS:



LOWER THORAX:

Severe emphysematous changes noted in lung bases.  There is airspace 

consolidation at the right lung base may represent atelectasis or 

pneumonia. 



LIVER:

The liver is mildly enlarged.  No gross lesion or ductal dilatation.  



GALLBLADDER AND BILE DUCTS:

Unremarkable. 



PANCREAS:

Unremarkable. No gross lesion or ductal dilatation.



SPLEEN:

Unremarkable. 



ADRENALS:

Unremarkable. No mass. 



KIDNEYS AND URETERS:

No evidence of nephrolithiasis or hydronephrosis.  Multiple 

low-attenuation cystic lesion seen in both kidneys larger and more on 

the left. 



VASCULATURE:

There is infrarenal abdominal aortic aneurysm measures 5.4 centimeter 

in the transverse diameter.  There is also mild aneurysmal dilatation 

of the distal abdominal aorta just above the bifurcation measures 3.8 

centimeter.  Multiple foci of aortic atherosclerotic calcification 

and mural plaque present.



BOWEL:

Mild constipation.  No obstruction. No gross mural thickening. 

Scattered colonic diverticulosis are seen without evidence of 

diverticulitis.  Large hiatus hernia 



APPENDIX:

No evidence of appendicitis 



PERITONEUM:

Unremarkable. No free fluid. No free air. 



LYMPH NODES:

Unremarkable. No enlarged lymph nodes. 



BLADDER:

Mild-to-moderate diffuse urinary bladder wall thickening 



REPRODUCTIVE:

Mildly enlarged prostate. 



BONES:

No acute fracture. 



OTHER FINDINGS:

None.



IMPRESSION:

No evidence of nephrolithiasis or hydronephrosis.  Multiple 

low-attenuation lesions in the renal cortex likely represent benign 

cysts. 



Hepatomegaly. 



Large hiatus hernia. 



Infrarenal abdominal aortic aneurysm measures 5.4 centimeter. 



Airspace consolidation at the right lung base may represent 

atelectasis or pneumonia.  Severe emphysema.

## 2018-11-26 NOTE — RAD
Date of service: 



11/26/2018



PROCEDURE:  CHEST RADIOGRAPH, 1 VIEW



HISTORY:

pneumonia



COMPARISON:

05/07/2018



FINDINGS:



LUNGS:

Clear.



PLEURA:

No pneumothorax or pleural fluid seen.



CARDIOVASCULAR:

 No aortic atherosclerotic calcification present. Normal.



OSSEOUS STRUCTURES:

No significant abnormalities.



VISUALIZED UPPER ABDOMEN:

Normal.



OTHER FINDINGS:

None. 



IMPRESSION:

No active disease.

## 2018-11-26 NOTE — CARD
--------------- APPROVED REPORT --------------





Date of service: 11/26/2018



EKG Measurement

Heart [id number]

[id number]

[id number]

[id number]

[id number]



<Conclusion>

Normal sinus rhythm

Nonspecific ST and T wave abnormality

Abnormal ECG

## 2018-11-26 NOTE — CT
Date of service: 



11/26/2018



PROCEDURE:  CT HEAD WITHOUT CONTRAST.



HISTORY:

altered mentation



COMPARISON:

04/20/2018



TECHNIQUE:

Axial computed tomography images were obtained through the head/brain 

without intravenous contrast.  



Radiation dose:



Total exam DLP = 2087.97 mGy-cm.



This CT exam was performed using one or more of the following dose 

reduction techniques: Automated exposure control, adjustment of the 

mA and/or kV according to patient size, and/or use of iterative 

reconstruction technique.



FINDINGS:



HEMORRHAGE:

No intracranial hemorrhage. 



BRAIN:

No mass effect or edema.  Chronic periventricular white matter 

ischemic disease.



VENTRICLES:

Unremarkable. No hydrocephalus. 



CALVARIUM:

Unremarkable.



PARANASAL SINUSES:

Unremarkable as visualized. No significant inflammatory changes.



MASTOID AIR CELLS:

Unremarkable as visualized. No inflammatory changes.



OTHER FINDINGS:

None.



IMPRESSION:

No acute intracranial hemorrhage.

## 2018-11-26 NOTE — CP.PCM.CON
<Reza Frausto - Last Filed: 18 22:58>





History of Present Illness





- History of Present Illness


History of Present Illness: 





James Frausto PGY2 - ICU Consult Note





Consult for ICU admission secondary to AMS, r/o sepsis 





HPI: 82 year old male with past medical history of HTN, COPD, hx of small 

bilateral subdural hematoma secondary to trauma from fall without neurosurgical 

intervention(2018), hypothyroidism, HLD, CAD s/p cardiac stent  who 

presented to [facility] for several day history of altered mental status/agitation. 

Patient at presentation is noted to be slightly agitated with limited one to two

word answers and thus is unreliable historian. Family is at bedside. HPI 

collected from medical professionals involved in case, medical records and wife 

at bedside. Patient's wife states that 6 days prior to admission patient was 

exhibiting episodes of odd behavior where he was having incoherrent/disorganized

speech. Patient symptoms continued to progress through out week. Wife noticed on

Thursday prior to admission patient began to have full conversations with 

himself responidng to auditory and visual hallucinations. Following this the 

patient became more and more agitated and restless. Wife noting he was uncontro

llable throwing things off their counter in their kitchen and difficult to 

settle down. Over the past week patient is noted to have limited sleep. Wife 

states patient did not complain of headache, vision changes, chest pain, 

shortness of breath, fever, chills, nausea, vomiting, diarrhea, constipation, 

abdominal discomfort. Prior to onset of symptoms patient [facility] Left [facility] done recently. No complications were 

reported from surgery and no new medicaitons were reported. 





ED Course: Patient found to be febrile at 100.5F, Tachycadic, agitated/restless 

requiring soft wrist restraints, EKG showing sinus tachycardia, CXR with no 

active disase, Head CT showing no acute intracranial abnormalities, Abd/Pelvis 

CT without IV contrast showing hepatomegaly, multiple low-attenuation lesions in

the renal cortex likely representing benign cysts, large hiatus hernia, 

infrarenal abdominal aortic aneurysm measuring 5.4cm, severe emphysema, Patient 

was given IVF, ceftriaxone, valcyclovir. 





PMH: HTN, HLD, COPD, Hx SDH, Hypothyroidism, Hearing impairment of the right ear

and loss of right eye secondary to  service


PSH: Cariac stent , Cataract surgery, 


SOCHx: Tobacco: Former EtOH: Denies, ID: Denies 


FMH: Unable to obtain 


ALL: NKDA


MEDS: MAR Reviewed








Review of Systems





- Review of Systems


Review of Systems: 





limited secondary to altered mental status 





Past Patient History





- Infectious Disease


Hx of Infectious Diseases: None





- Tetanus Immunizations


Tetanus Immunization: Up to Date





- Past Social History


Smoking Status: Former Smoker


Alcohol: None


Drugs: Denies





- CARDIAC


Hx Cardiac Disorders: Yes (CARDIAC STNEET )


Hx Hypertension: Yes





- PULMONARY


Hx Chronic Obstructive Pulmonary Disease (COPD): Yes





- NEUROLOGICAL


Hx Neurological Disorder: Yes





- HEENT


Hx HEENT Problems: Yes


Hx Blind: Yes (RIGHT EYE)


Hx Cataracts: Yes (LEFT EYE)


Hx Deafness: Yes (RIGHT EAR)





- RENAL


Hx Chronic Kidney Disease: No





- HEMATOLOGICAL/ONCOLOGICAL


Hx Blood Disorders: No





- INTEGUMENTARY


Hx Dermatological Problems: No





- MUSCULOSKELETAL/RHEUMATOLOGICAL


Hx Musculoskeletal Disorders: No


Hx Falls: Yes





- GASTROINTESTINAL


Hx Gastrointestinal Disorders: No





- GENITOURINARY/GYNECOLOGICAL


Hx Genitourinary Disorders: No





- PSYCHIATRIC


Hx Depression: No


Hx Emotional Abuse: No


Hx Physical Abuse: No


Hx Substance Use: No





- SURGICAL HISTORY


Hx Surgeries: Yes (CARDIAC STENT , R EYE SX, AP, T&A)





- ANESTHESIA


Hx Anesthesia: Yes


Hx Anesthesia Reactions: No


Hx Malignant Hyperthermia: No





Meds


Allergies/Adverse Reactions: 


                                    Allergies











Allergy/AdvReac Type Severity Reaction Status Date / Time


 


No Known Allergies Allergy   Verified 18 15:33














- Medications


Medications: 


                               Current Medications





Acetaminophen (Tylenol 325mg Tab)  650 mg PO Q6H PRN


   PRN Reason: Fever >100.4 F


Acetaminophen (Tylenol 325 Mg Supp)  325 mg RC Q6H PRN


   PRN Reason: Fever >100.4 F


Aspirin (Ecotrin)  81 mg PO DAILY [facility]


Atorvastatin Calcium (Lipitor)  20 mg PO DAILY [facility]


Brimonidine Tartrate (Alphagan P 0.15% Opht)  1 drop OS Q8H [facility]


Cholecalciferol (Vitamin D)  2,000 intlu PO DAILY [facility]


Sodium Chloride (Sodium Chloride 0.9%)  1,000 mls @ 150 mls/hr IV .Q6H40M [facility]


   Last Admin: 18 17:33 Dose:  150 mls/hr


Levothyroxine Sodium (Synthroid)  125 mcg PO DAILY [facility]


Lisinopril (Zestril)  20 mg PO DAILY [facility]


Metoprolol Succinate (Toprol Xl)  50 mg PO DAILY [facility]


Pantoprazole Sodium (Protonix Inj)  40 mg IVP DAILY [facility]


Risperidone (Risperdal Oral Soln)  0.5 mg PO HS [facility]; Protocol











Physical Exam





- Constitutional


Appears: Agitated, Confused





- Head Exam


Head Exam: ATRAUMATIC, NORMAL INSPECTION, NORMOCEPHALIC





- Eye Exam


Pupil Exam: NORMAL ACCOMODATION


Additional comments: 





Left eye with proptosis, EOMI 


Right eye socket with prosthetic eye, green conjunctival around eye 





- ENT Exam


ENT Exam: Mucous Membranes Dry


Additional comments: 





tongue appears slightly swollen, no lesions, ulcers appreciated on tongue or 

within oral cavity 





- Neck Exam


Neck exam: Positive for: Full Rom


Additional comments: 





negative for neck stiffness 





- Respiratory Exam


Respiratory Exam: Decreased Breath Sounds, Clear to Auscultation Bilateral, 

NORMAL BREATHING PATTERN.  absent: Rhonchi, Wheezes, Respiratory Distress





- Cardiovascular Exam


Cardiovascular Exam: Tachycardia, REGULAR RHYTHM, +S1, +S2





- GI/Abdominal Exam


GI & Abdominal Exam: Distended, Normal Bowel Sounds, Organomegaly (hepatomegaly 

), Soft.  absent: Guarding, Tenderness





- Neurological Exam


Neurological exam: Alert, Reflexes Normal


Additional comments: 





Patient offering limited cooperation with exam


motor and sensory grossly intact


Reflex normal bilaterally 





- Psychiatric Exam


Psychiatric exam: Agitated, Anxious





- Skin


Skin Exam: Dry, Intact





Results





- Vital Signs


Recent Vital Signs: 


                                Last Vital Signs











Temp  99.6 F   18 20:52


 


Pulse  93 H  18 20:52


 


Resp  18   18 20:52


 


BP  149/82   18 20:52


 


Pulse Ox  97   18 20:52














- Labs


Result Diagrams: 


                                 18 15:20





                                 18 15:20


Labs: 


                         Laboratory Results - last 24 hr











  18





  15:03 15:20 15:20


 


WBC    11.5 H


 


RBC    4.59


 


Hgb    14.5


 


Hct    42.2


 


MCV    91.9


 


MCH    31.6


 


MCHC    34.4


 


RDW    13.6


 


Plt Count    221


 


MPV    9.9


 


Gran %    72.6 H


 


Lymph % (Auto)    15.5 L


 


Mono % (Auto)    10.7 H


 


Eos % (Auto)    1.1 L


 


Baso % (Auto)    0.1


 


Gran #    8.37 H


 


Lymph # (Auto)    1.8


 


Mono # (Auto)    1.2 H


 


Eos # (Auto)    0.1


 


Baso # (Auto)    0.01


 


PT   


 


INR   


 


APTT   


 


pCO2   


 


pO2   


 


HCO3   


 


ABG pH   


 


ABG Total CO2   


 


ABG O2 Saturation   


 


ABG O2 Content   


 


ABG Base Excess   


 


ABG Hemoglobin   


 


ABG Carboxyhemoglobin   


 


POC ABG HHb (Measured)   


 


ABG Methemoglobin   


 


ABG O2 Capacity   


 


Hgb O2 Saturation   


 


FiO2   


 


Sodium   143 


 


Potassium   3.8 


 


Chloride   106 


 


Carbon Dioxide   26 


 


Anion Gap   14 


 


BUN   12 


 


Creatinine   1.0 


 


Est GFR ( Amer)   > 60 


 


Est GFR (Non-Af Amer)   > 60 


 


POC Glucose (mg/dL)  108  


 


Random Glucose   116 H 


 


Calcium   9.6 


 


Magnesium   1.9 


 


Total Bilirubin   0.8 


 


AST   21 


 


ALT   21 


 


Alkaline Phosphatase   83 


 


Total Protein   7.3 


 


Albumin   4.1 


 


Globulin   3.2 


 


Albumin/Globulin Ratio   1.3 


 


Urine Color   


 


Urine Appearance   


 


Urine pH   


 


Ur Specific Gravity   


 


Urine Protein   


 


Urine Glucose (UA)   


 


Urine Ketones   


 


Urine Blood   


 


Urine Nitrate   


 


Urine Bilirubin   


 


Urine Urobilinogen   


 


Ur Leukocyte Esterase   


 


Urine RBC   


 


Urine WBC   














  18





  16:19 16:31 18:56


 


WBC   


 


RBC   


 


Hgb   


 


Hct   


 


MCV   


 


MCH   


 


MCHC   


 


RDW   


 


Plt Count   


 


MPV   


 


Gran %   


 


Lymph % (Auto)   


 


Mono % (Auto)   


 


Eos % (Auto)   


 


Baso % (Auto)   


 


Gran #   


 


Lymph # (Auto)   


 


Mono # (Auto)   


 


Eos # (Auto)   


 


Baso # (Auto)   


 


PT    14.0 H


 


INR    1.22


 


APTT    32.3


 


pCO2   33 L 


 


pO2   66.0 L 


 


HCO3   24.0 


 


ABG pH   7.47 H 


 


ABG Total CO2   25.0 


 


ABG O2 Saturation   95.7 


 


ABG O2 Content   19.0 


 


ABG Base Excess   1.0 


 


ABG Hemoglobin   14.6 


 


ABG Carboxyhemoglobin   2.4 H 


 


POC ABG HHb (Measured)   4.2 


 


ABG Methemoglobin   1.0 


 


ABG O2 Capacity   19.9 


 


Hgb O2 Saturation   92.4 L 


 


FiO2   21.0 


 


Sodium   


 


Potassium   


 


Chloride   


 


Carbon Dioxide   


 


Anion Gap   


 


BUN   


 


Creatinine   


 


Est GFR ( Amer)   


 


Est GFR (Non-Af Amer)   


 


POC Glucose (mg/dL)   


 


Random Glucose   


 


Calcium   


 


Magnesium   


 


Total Bilirubin   


 


AST   


 


ALT   


 


Alkaline Phosphatase   


 


Total Protein   


 


Albumin   


 


Globulin   


 


Albumin/Globulin Ratio   


 


Urine Color  Yellow  


 


Urine Appearance  Sl cloudy  


 


Urine pH  6.0  


 


Ur Specific Gravity  1.015  


 


Urine Protein  Trace H  


 


Urine Glucose (UA)  Negative  


 


Urine Ketones  Negative  


 


Urine Blood  Moderate H  


 


Urine Nitrate  Negative  


 


Urine Bilirubin  Negative  


 


Urine Urobilinogen  0.2  


 


Ur Leukocyte Esterase  Negative  


 


Urine RBC  25 - 30  


 


Urine WBC  0 - 2  














Assessment & Plan





- Assessment and Plan (Free Text)


Assessment: 





82 year old male with past medical history of COPD, HTN, HLD, Hypothyroidism, hx

of SDH who presented to [facility] for altered mental status. Patient to be accepted

to ICU for further evaluation and treatment AMS


Plan: 





Neurology: 


Altered mental Status


- Etiology: Sepsis vs. encephalitis vs. medication vs. delrium 


- Head CT: no acute intracranial abnormalities


- Ceftriaxone, Acyclovir


- Neurology consult


- ID Consult


- Maintain euglycemia





Cardiology: 


Tachycardia, Hx CAD, HTN, HLD, Infrarenal AAA 5.4cm 


- EKG sinus tachycardia, NO ST elevation/depression


- metoprolol, lisinopril, Lipitor 


- Maintain MAP >65


- No previous abdominal aorta imaging to compare


- Echocardiogram(2018): EF 55-60%, mild AS, Aortic root is borderline 

enlarged, no vegetation or thrombus noted





Pulmonology:


COPD, hx Emphysema


- NC, SaO2 goal 88-94%


- Continue home meds


- AB.47/33/24 FiO2 21%


- CXR reviewed showing no active disease





GI: 


- Abd/Pelvis CT without IV contrast showing hepatomegaly, multiple low-

attenuation lesions in the renal cortex likely representing benign cysts, large 

hiatus hernia, infrarenal abdominal aortic aneurysm measuring 5.4cm


- LFTs wnl


- Protonix 


- NPO diet except meds





Infectious Disease: 


- Leukocytosis, Febrile, Tachycardia, Hypertension


- Ceftriaxone, Acyclovir


- ID consulted, appreciate recs





Psych: 


- Agitation, AMS


- Ativann 2mg IV once in ED


- Continue to monitor 





PPX


- Protonix


- SCD





Patient seen, case and plan discussed with attending, Dr. Rodríguez 











- Date & Time


Date: 18


Time: 21:46





<Luis Rodríguez - Last Filed: 18 05:09>





Meds





- Medications


Medications: 


                               Current Medications





Acetaminophen (Tylenol 325mg Tab)  650 mg PO Q6H PRN


   PRN Reason: Fever >100.4 F


Acetaminophen (Tylenol 325 Mg Supp)  325 mg RC Q6H PRN


   PRN Reason: Fever >100.4 F


Aspirin (Ecotrin)  81 mg PO DAILY [facility]


Atorvastatin Calcium (Lipitor)  20 mg PO DAILY [facility]


Brimonidine Tartrate (Alphagan P 0.15% Opht)  1 drop OS Q8H [facility]


Cholecalciferol (Vitamin D)  2,000 intlu PO DAILY [facility]


Sodium Chloride (Sodium Chloride 0.9%)  1,000 mls @ 150 mls/hr IV .Q6H40M [facility]


   Last Admin: 18 17:33 Dose:  150 mls/hr


Levothyroxine Sodium (Synthroid)  125 mcg PO DAILY [facility]


Lisinopril (Zestril)  20 mg PO DAILY [facility]


Metoprolol Succinate (Toprol Xl)  50 mg PO DAILY [facility]


Pantoprazole Sodium (Protonix Inj)  40 mg IVP DAILY [facility]


Risperidone (Risperdal Oral Soln)  0.5 mg PO HS [facility]; Protocol


   Last Admin: 18 22:25 Dose:  Not Given











Results





- Vital Signs


Recent Vital Signs: 


                                Last Vital Signs











Temp  99.6 F   18 20:52


 


Pulse  93 H  18 22:20


 


Resp  18   18 22:24


 


BP  147/79   18 22:12


 


Pulse Ox  92 L  18 22:20














- Labs


Result Diagrams: 


                                 18 15:20





                                 18 15:20


Labs: 


                         Laboratory Results - last 24 hr











  18





  15:03 15:20 15:20


 


WBC    11.5 H


 


RBC    4.59


 


Hgb    14.5


 


Hct    42.2


 


MCV    91.9


 


MCH    31.6


 


MCHC    34.4


 


RDW    13.6


 


Plt Count    221


 


MPV    9.9


 


Gran %    72.6 H


 


Lymph % (Auto)    15.5 L


 


Mono % (Auto)    10.7 H


 


Eos % (Auto)    1.1 L


 


Baso % (Auto)    0.1


 


Gran #    8.37 H


 


Lymph # (Auto)    1.8


 


Mono # (Auto)    1.2 H


 


Eos # (Auto)    0.1


 


Baso # (Auto)    0.01


 


PT   


 


INR   


 


APTT   


 


pCO2   


 


pO2   


 


HCO3   


 


ABG pH   


 


ABG Total CO2   


 


ABG O2 Saturation   


 


ABG O2 Content   


 


ABG Base Excess   


 


ABG Hemoglobin   


 


ABG Carboxyhemoglobin   


 


POC ABG HHb (Measured)   


 


ABG Methemoglobin   


 


ABG O2 Capacity   


 


Hgb O2 Saturation   


 


FiO2   


 


Sodium   143 


 


Potassium   3.8 


 


Chloride   106 


 


Carbon Dioxide   26 


 


Anion Gap   14 


 


BUN   12 


 


Creatinine   1.0 


 


Est GFR ( Amer)   > 60 


 


Est GFR (Non-Af Amer)   > 60 


 


POC Glucose (mg/dL)  108  


 


Random Glucose   116 H 


 


Calcium   9.6 


 


Magnesium   1.9 


 


Total Bilirubin   0.8 


 


AST   21 


 


ALT   21 


 


Alkaline Phosphatase   83 


 


Total Protein   7.3 


 


Albumin   4.1 


 


Globulin   3.2 


 


Albumin/Globulin Ratio   1.3 


 


Urine Color   


 


Urine Appearance   


 


Urine pH   


 


Ur Specific Gravity   


 


Urine Protein   


 


Urine Glucose (UA)   


 


Urine Ketones   


 


Urine Blood   


 


Urine Nitrate   


 


Urine Bilirubin   


 


Urine Urobilinogen   


 


Ur Leukocyte Esterase   


 


Urine RBC   


 


Urine WBC   














  18





  16:19 16:31 18:56


 


WBC   


 


RBC   


 


Hgb   


 


Hct   


 


MCV   


 


MCH   


 


MCHC   


 


RDW   


 


Plt Count   


 


MPV   


 


Gran %   


 


Lymph % (Auto)   


 


Mono % (Auto)   


 


Eos % (Auto)   


 


Baso % (Auto)   


 


Gran #   


 


Lymph # (Auto)   


 


Mono # (Auto)   


 


Eos # (Auto)   


 


Baso # (Auto)   


 


PT    14.0 H


 


INR    1.22


 


APTT    32.3


 


pCO2   33 L 


 


pO2   66.0 L 


 


HCO3   24.0 


 


ABG pH   7.47 H 


 


ABG Total CO2   25.0 


 


ABG O2 Saturation   95.7 


 


ABG O2 Content   19.0 


 


ABG Base Excess   1.0 


 


ABG Hemoglobin   14.6 


 


ABG Carboxyhemoglobin   2.4 H 


 


POC ABG HHb (Measured)   4.2 


 


ABG Methemoglobin   1.0 


 


ABG O2 Capacity   19.9 


 


Hgb O2 Saturation   92.4 L 


 


FiO2   21.0 


 


Sodium   


 


Potassium   


 


Chloride   


 


Carbon Dioxide   


 


Anion Gap   


 


BUN   


 


Creatinine   


 


Est GFR ( Amer)   


 


Est GFR (Non-Af Amer)   


 


POC Glucose (mg/dL)   


 


Random Glucose   


 


Calcium   


 


Magnesium   


 


Total Bilirubin   


 


AST   


 


ALT   


 


Alkaline Phosphatase   


 


Total Protein   


 


Albumin   


 


Globulin   


 


Albumin/Globulin Ratio   


 


Urine Color  Yellow  


 


Urine Appearance  Sl cloudy  


 


Urine pH  6.0  


 


Ur Specific Gravity  1.015  


 


Urine Protein  Trace H  


 


Urine Glucose (UA)  Negative  


 


Urine Ketones  Negative  


 


Urine Blood  Moderate H  


 


Urine Nitrate  Negative  


 


Urine Bilirubin  Negative  


 


Urine Urobilinogen  0.2  


 


Ur Leukocyte Esterase  Negative  


 


Urine RBC  25 - 30  


 


Urine WBC  0 - 2  














Attending/Attestation





- Attestation


I have personally seen and examined this patient.: Yes


I have fully participated in the care of the patient.: Yes


I have reviewed all pertinent clinical information: Yes

## 2018-11-27 LAB
ALBUMIN SERPL-MCNC: 4.1 G/DL (ref 3–4.8)
ALBUMIN/GLOB SERPL: 1.2 {RATIO} (ref 1.1–1.8)
ALT SERPL-CCNC: 27 U/L (ref 7–56)
AST SERPL-CCNC: 23 U/L (ref 17–59)
BASOPHILS # BLD AUTO: 0.03 K/MM3 (ref 0–2)
BASOPHILS NFR BLD: 0.2 % (ref 0–3)
BUN SERPL-MCNC: 12 MG/DL (ref 7–21)
CALCIUM SERPL-MCNC: 9.4 MG/DL (ref 8.4–10.5)
EOSINOPHIL # BLD: 0.2 10*3/UL (ref 0–0.7)
EOSINOPHIL NFR BLD: 1.3 % (ref 1.5–5)
ERYTHROCYTE [DISTWIDTH] IN BLOOD BY AUTOMATED COUNT: 13.9 % (ref 11.5–14.5)
GFR NON-AFRICAN AMERICAN: > 60
GRANULOCYTES # BLD: 9.56 10*3/UL (ref 1.4–6.5)
GRANULOCYTES NFR BLD: 76.3 % (ref 50–68)
HGB BLD-MCNC: 14 G/DL (ref 14–18)
INR PPP: 1.23
LYMPHOCYTES # BLD: 1.3 10*3/UL (ref 1.2–3.4)
LYMPHOCYTES NFR BLD AUTO: 10.1 % (ref 22–35)
MCH RBC QN AUTO: 30.9 PG (ref 25–35)
MCHC RBC AUTO-ENTMCNC: 33.2 G/DL (ref 31–37)
MCV RBC AUTO: 93.2 FL (ref 80–105)
MONOCYTES # BLD AUTO: 1.5 10*3/UL (ref 0.1–0.6)
MONOCYTES NFR BLD: 12.1 % (ref 1–6)
PLATELET # BLD: 212 10^3/UL (ref 120–450)
PMV BLD AUTO: 9.9 FL (ref 7–11)
PROTHROMBIN TIME: 14.2 SECONDS (ref 9.4–12.5)
RBC # BLD AUTO: 4.53 10^6/UL (ref 3.5–6.1)
WBC # BLD AUTO: 12.5 10^3/UL (ref 4.5–11)

## 2018-11-27 RX ADMIN — IPRATROPIUM BROMIDE AND ALBUTEROL SULFATE SCH ML: .5; 3 SOLUTION RESPIRATORY (INHALATION) at 20:05

## 2018-11-27 RX ADMIN — PREDNISOLONE ACETATE SCH DROP: 10 SUSPENSION/ DROPS OPHTHALMIC at 16:51

## 2018-11-27 RX ADMIN — VITAMIN D, TAB 1000IU (100/BT) SCH: 25 TAB at 09:09

## 2018-11-27 RX ADMIN — VANCOMYCIN HYDROCHLORIDE SCH MLS/HR: 1 INJECTION, POWDER, LYOPHILIZED, FOR SOLUTION INTRAVENOUS at 13:33

## 2018-11-27 RX ADMIN — IPRATROPIUM BROMIDE AND ALBUTEROL SULFATE SCH ML: .5; 3 SOLUTION RESPIRATORY (INHALATION) at 08:04

## 2018-11-27 RX ADMIN — CEFTRIAXONE SCH MLS/HR: 2 INJECTION, POWDER, FOR SOLUTION INTRAMUSCULAR; INTRAVENOUS at 13:42

## 2018-11-27 RX ADMIN — BRIMONIDINE TARTRATE SCH APPLIC: 1.5 SOLUTION/ DROPS OPHTHALMIC at 05:04

## 2018-11-27 RX ADMIN — IPRATROPIUM BROMIDE AND ALBUTEROL SULFATE SCH ML: .5; 3 SOLUTION RESPIRATORY (INHALATION) at 13:23

## 2018-11-27 RX ADMIN — BRIMONIDINE TARTRATE SCH APPLIC: 1.5 SOLUTION/ DROPS OPHTHALMIC at 13:34

## 2018-11-27 RX ADMIN — BRIMONIDINE TARTRATE SCH APPLIC: 1.5 SOLUTION/ DROPS OPHTHALMIC at 20:30

## 2018-11-27 RX ADMIN — ENALAPRILAT SCH MG: 1.25 INJECTION, SOLUTION INTRAVENOUS at 16:49

## 2018-11-27 NOTE — CON
DATE:  11/27/2018



HISTORY OF PRESENT ILLNESS:  In short, the patient is an 82-year-old

 male, multiple medical issues, hypertension, COPD, and

hypothyroidism.  The patient also has history of deafness and visual

impairment.  The patient was brought in by his wife for evaluation of

altered mental status.  The patient has history of a subdural hematoma. 

This writer is very familiar with this patient from the previous admission

from the medical side, and this writer was involved as a consultant.  From

the previous admission, the patient was stabilized on small dose of

benzodiazepines as well as Risperdal due to delirium.  At this time, the

patient was admitted for evaluation of altered mental status and fever. 

Psych consult was called for combative behavior, hallucinations, and

possible delirium.  The patient was seen and examined today in the ICU. 

There is no option to have meaningful conversation because the patient is

deeply sleeping.  As per nursing staff, the patient got Ativan because the

patient was restless and was in danger to self or others.  The patient is

deeply sleeping.  No option to have interview.



PHYSICAL EXAMINATION:

VITAL SIGNS:  Reviewed.  Pulse is 108, blood pressure 161/95, and

respirations 40.  The patient was feverish yesterday at 2:54, 100.5.



MEDICATIONS:  Medications reviewed.  The patient is on DuoNeb, aspirin,

Lipitor, vitamin D, Synthroid, Zestril, Toprol, and Protonix.  The patient

is on Risperdal oral solution 0.5 mg at the nighttime, sodium chloride.



LABORATORY DATA:  Labs also reviewed.  The patient has leukocytosis.  WBC

cells 12.5, granulocytes are 9.56.  Coagulation reviewed.  Blood gas

reviewed.  Chemistry reviewed.  Urinalysis reviewed.  Blood moderate and

protein trace.  Microbiology reviewed.  Blood bank reviewed.



MENTAL STATUS EXAMINATION:  This writer was not able to assess mental

status because the patient is status post medication and deeply sleeping. 

This writer is very familiar with this patient from the previous admission.

Previous notes reviewed.



IMPRESSION:  Considering the fact that the patient was agitated and needed

to be medicated, most likely the patient is in delirium stage.  The patient

also has history of dementia and multiple medical issues.



PLAN:  Agree with the medical team with a small dose of Ativan 0.5 mg twice

a day, IV push as needed for restless and agitated behavior as well as

Risperdal was started 0.5 mg at the nighttime, could be given as liquid

form.  Family is involved.  This writer discussed treatment plan with the

wife last admission, and the patient is on same medication regimen right

now.  We will follow up and advise accordingly.



Thank you very much for letting me participate in care of your patient. 

Should you have any questions, give me a call back.







__________________________________________

Josiane Oneill MD





DD:  11/27/2018 9:31:30

DT:  11/27/2018 9:32:32

Job # [id number]

## 2018-11-27 NOTE — CP.PCM.HP
<Fredo Lucero - Last Filed: 11/27/18 16:45>





History of Present Illness





- History of Present Illness


History of Present Illness: 





H&P for Dr. Tolentino Service





CC: AMS/Increasing agitation





HPI: This is an 81 yo M with PMH of HTN, HLD, COPD, Hx SDH 2/2 fall trauma, CAD 

s/p stenting, Hypothyroidism, Hearing impairment of the right ear, and traumatic

loss of right eye who presented to [facility] [place], [place] ED for several day history of altered 

mental status/agitation.  History primarily obtained from charting and wife at 

bedside, and patient is altered and incoherent.  Patient developed AMS 6 days 

prior, and began having full-blown audiovisual hallucinations (having full 

conversations with entities not actually present) 4 days prior.  No fevers or 

productive cough at home, last sick contact was wife 3-4 week prior, but patient

did have cataract surgery approximately 2 weeks prior.  AMS has progressively 

worsened to point of agitation/aggression against family members in last 2 days 

prior to presentation.  





  On arrival to hospital, patient was noted to have temp of 100.5F, leukocytosis

of 11.5, tachycardia, and agitation requiring use of wrist restraints.  Imaging 

of head was negative for acute process, CXR was not notable for any acute 

infiltrates, but CT abd/pelvis was concerning for AAA at 5.4cm.





PMH: as above


PSH: Cardiac stent 2003, Cataract surgery 2 weeks prior to this admission


Soc Hx: former tobacco (quit > 10 yrs ago, > 50 pack years), denies 

alcohol/illicits 


Fam Hx: Unknown 





Meds: reviewed in MAR





PMD: Dr. Tolentino





Present on Admission





- Present on Admission


Any Indicators Present on Admission: No


History of DVT/PE: No


History of Uncontrolled Diabetes: No


Urinary Catheter: No





Review of Systems





- Review of Systems


Systems not reviewed;Unavailable: Altered Mental Status





Past Patient History





- Infectious Disease


Hx of Infectious Diseases: None





- Tetanus Immunizations


Tetanus Immunization: Up to Date





- Past Social History


Smoking Status: Former Smoker


Alcohol: None


Drugs: Denies





- CARDIAC


Hx Cardiac Disorders: Yes (CARDIAC STNEET 2003)


Hx Hypertension: Yes





- PULMONARY


Hx Chronic Obstructive Pulmonary Disease (COPD): Yes





- NEUROLOGICAL


Hx Neurological Disorder: Yes





- HEENT


Hx HEENT Problems: Yes


Hx Blind: Yes (RIGHT EYE)


Hx Cataracts: Yes (LEFT EYE)


Hx Deafness: Yes (RIGHT EAR)





- RENAL


Hx Chronic Kidney Disease: No





- HEMATOLOGICAL/ONCOLOGICAL


Hx Blood Disorders: No





- INTEGUMENTARY


Hx Dermatological Problems: No





- MUSCULOSKELETAL/RHEUMATOLOGICAL


Hx Musculoskeletal Disorders: No


Hx Falls: Yes





- GASTROINTESTINAL


Hx Gastrointestinal Disorders: No





- GENITOURINARY/GYNECOLOGICAL


Hx Genitourinary Disorders: No





- PSYCHIATRIC


Hx Depression: No


Hx Emotional Abuse: No


Hx Physical Abuse: No


Hx Substance Use: No





- SURGICAL HISTORY


Hx Surgeries: Yes (CARDIAC STENT 2003, R EYE SX, AP, T&A)





- ANESTHESIA


Hx Anesthesia: Yes


Hx Anesthesia Reactions: No


Hx Malignant Hyperthermia: No





Meds


Allergies/Adverse Reactions: 


                                    Allergies











Allergy/AdvReac Type Severity Reaction Status Date / Time


 


No Known Allergies Allergy   Verified 04/28/18 15:33














Physical Exam





- Constitutional


Appears: Non-toxic, Confused (incoherent speech, confused, not following 

commands), Chronically Ill





- Head Exam


Head Exam: ATRAUMATIC, NORMAL INSPECTION, NORMOCEPHALIC





- Eye Exam


Eye Exam: Normal appearance (Left eye normal appearance).  absent: Conjunctival 

injection, Scleral icterus


Pupil Exam: absent: Irregular, Unequal


Additional comments: 





Absent right eye, no discharge or swelling around artificial R eye





- ENT Exam


ENT Exam: Mucous Membranes Dry


Additional comments: 





snoring sounds while breathing with mouth open





- Neck Exam


Neck exam: Positive for: Full Rom (passively, not following commands)





- Respiratory Exam


Respiratory Exam: Clear to Auscultation Bilateral, NORMAL BREATHING PATTERN.  

absent: Accessory Muscle Use, Decreased Breath Sounds, Rales, Rhonchi, Wheezes





- Cardiovascular Exam


Cardiovascular Exam: Tachycardia (tachy to 110's on bedside monitor), REGULAR 

RHYTHM, +S1, +S2.  absent: Bradycardia, Irregular Rhythm, JVD, +S4





- GI/Abdominal Exam


GI & Abdominal Exam: Normal Bowel Sounds, Soft.  absent: Diminished Bowel 

Sounds, Distended, Firm, Hyperactive Bowel Sounds, Hypoactive Bowel Sounds, 

Rigid





- Extremities Exam


Extremities exam: Positive for: normal capillary refill, pedal pulses present.  

Negative for: pedal edema





- Neurological Exam


Additional comments: 





altered, not following commands, attempting to speak but words are incoherent, 

intermittently moving extremities spontaneously and trying to slide out of bed





- Psychiatric Exam


Additional comments: 





unable to assess, altered and non-verbal





- Skin


Skin Exam: Dry, Intact, Normal Color, Warm





Results





- Vital Signs


Recent Vital Signs: 





                                Last Vital Signs











Temp  98.2 F   11/27/18 06:00


 


Pulse  114 H  11/27/18 06:34


 


Resp  40 H  11/27/18 06:34


 


BP  169/101 H  11/27/18 06:00


 


Pulse Ox  98   11/27/18 05:30














- Labs


Result Diagrams: 


                                 11/27/18 05:15





                                 11/27/18 05:15


Labs: 





                         Laboratory Results - last 24 hr











  11/26/18 11/26/18 11/26/18





  15:03 15:20 15:20


 


WBC    11.5 H


 


RBC    4.59


 


Hgb    14.5


 


Hct    42.2


 


MCV    91.9


 


MCH    31.6


 


MCHC    34.4


 


RDW    13.6


 


Plt Count    221


 


MPV    9.9


 


Gran %    72.6 H


 


Lymph % (Auto)    15.5 L


 


Mono % (Auto)    10.7 H


 


Eos % (Auto)    1.1 L


 


Baso % (Auto)    0.1


 


Gran #    8.37 H


 


Lymph # (Auto)    1.8


 


Mono # (Auto)    1.2 H


 


Eos # (Auto)    0.1


 


Baso # (Auto)    0.01


 


PT   


 


INR   


 


APTT   


 


pCO2   


 


pO2   


 


HCO3   


 


ABG pH   


 


ABG Total CO2   


 


ABG O2 Saturation   


 


ABG O2 Content   


 


ABG Base Excess   


 


ABG Hemoglobin   


 


ABG Carboxyhemoglobin   


 


POC ABG HHb (Measured)   


 


ABG Methemoglobin   


 


ABG O2 Capacity   


 


Hgb O2 Saturation   


 


FiO2   


 


Sodium   143 


 


Potassium   3.8 


 


Chloride   106 


 


Carbon Dioxide   26 


 


Anion Gap   14 


 


BUN   12 


 


Creatinine   1.0 


 


Est GFR ( Amer)   > 60 


 


Est GFR (Non-Af Amer)   > 60 


 


POC Glucose (mg/dL)  108  


 


Random Glucose   116 H 


 


Calcium   9.6 


 


Phosphorus   


 


Magnesium   1.9 


 


Total Bilirubin   0.8 


 


AST   21 


 


ALT   21 


 


Alkaline Phosphatase   83 


 


Total Protein   7.3 


 


Albumin   4.1 


 


Globulin   3.2 


 


Albumin/Globulin Ratio   1.3 


 


Urine Color   


 


Urine Appearance   


 


Urine pH   


 


Ur Specific Gravity   


 


Urine Protein   


 


Urine Glucose (UA)   


 


Urine Ketones   


 


Urine Blood   


 


Urine Nitrate   


 


Urine Bilirubin   


 


Urine Urobilinogen   


 


Ur Leukocyte Esterase   


 


Urine RBC   


 


Urine WBC   














  11/26/18 11/26/18 11/26/18





  16:19 16:31 18:56


 


WBC   


 


RBC   


 


Hgb   


 


Hct   


 


MCV   


 


MCH   


 


MCHC   


 


RDW   


 


Plt Count   


 


MPV   


 


Gran %   


 


Lymph % (Auto)   


 


Mono % (Auto)   


 


Eos % (Auto)   


 


Baso % (Auto)   


 


Gran #   


 


Lymph # (Auto)   


 


Mono # (Auto)   


 


Eos # (Auto)   


 


Baso # (Auto)   


 


PT    14.0 H


 


INR    1.22


 


APTT    32.3


 


pCO2   33 L 


 


pO2   66.0 L 


 


HCO3   24.0 


 


ABG pH   7.47 H 


 


ABG Total CO2   25.0 


 


ABG O2 Saturation   95.7 


 


ABG O2 Content   19.0 


 


ABG Base Excess   1.0 


 


ABG Hemoglobin   14.6 


 


ABG Carboxyhemoglobin   2.4 H 


 


POC ABG HHb (Measured)   4.2 


 


ABG Methemoglobin   1.0 


 


ABG O2 Capacity   19.9 


 


Hgb O2 Saturation   92.4 L 


 


FiO2   21.0 


 


Sodium   


 


Potassium   


 


Chloride   


 


Carbon Dioxide   


 


Anion Gap   


 


BUN   


 


Creatinine   


 


Est GFR ( Amer)   


 


Est GFR (Non-Af Amer)   


 


POC Glucose (mg/dL)   


 


Random Glucose   


 


Calcium   


 


Phosphorus   


 


Magnesium   


 


Total Bilirubin   


 


AST   


 


ALT   


 


Alkaline Phosphatase   


 


Total Protein   


 


Albumin   


 


Globulin   


 


Albumin/Globulin Ratio   


 


Urine Color  Yellow  


 


Urine Appearance  Sl cloudy  


 


Urine pH  6.0  


 


Ur Specific Gravity  1.015  


 


Urine Protein  Trace H  


 


Urine Glucose (UA)  Negative  


 


Urine Ketones  Negative  


 


Urine Blood  Moderate H  


 


Urine Nitrate  Negative  


 


Urine Bilirubin  Negative  


 


Urine Urobilinogen  0.2  


 


Ur Leukocyte Esterase  Negative  


 


Urine RBC  25 - 30  


 


Urine WBC  0 - 2  














  11/27/18 11/27/18 11/27/18





  05:15 05:15 05:15


 


WBC   12.5 H 


 


RBC   4.53 


 


Hgb   14.0 


 


Hct   42.2 


 


MCV   93.2 


 


MCH   30.9 


 


MCHC   33.2 


 


RDW   13.9 


 


Plt Count   212 


 


MPV   9.9 


 


Gran %   76.3 H 


 


Lymph % (Auto)   10.1 L 


 


Mono % (Auto)   12.1 H 


 


Eos % (Auto)   1.3 L 


 


Baso % (Auto)   0.2 


 


Gran #   9.56 H 


 


Lymph # (Auto)   1.3 


 


Mono # (Auto)   1.5 H 


 


Eos # (Auto)   0.2 


 


Baso # (Auto)   0.03 


 


PT    14.2 H


 


INR    1.23


 


APTT   


 


pCO2   


 


pO2   


 


HCO3   


 


ABG pH   


 


ABG Total CO2   


 


ABG O2 Saturation   


 


ABG O2 Content   


 


ABG Base Excess   


 


ABG Hemoglobin   


 


ABG Carboxyhemoglobin   


 


POC ABG HHb (Measured)   


 


ABG Methemoglobin   


 


ABG O2 Capacity   


 


Hgb O2 Saturation   


 


FiO2   


 


Sodium  144  


 


Potassium  3.8  


 


Chloride  107  


 


Carbon Dioxide  26  


 


Anion Gap  15  


 


BUN  12  


 


Creatinine  0.9  


 


Est GFR ( Amer)  > 60  


 


Est GFR (Non-Af Amer)  > 60  


 


POC Glucose (mg/dL)   


 


Random Glucose  125 H  


 


Calcium  9.4  


 


Phosphorus  3.6  


 


Magnesium  1.8  


 


Total Bilirubin  0.9  


 


AST  23  


 


ALT  27  


 


Alkaline Phosphatase  82  


 


Total Protein  7.5  


 


Albumin  4.1  


 


Globulin  3.4  


 


Albumin/Globulin Ratio  1.2  


 


Urine Color   


 


Urine Appearance   


 


Urine pH   


 


Ur Specific Gravity   


 


Urine Protein   


 


Urine Glucose (UA)   


 


Urine Ketones   


 


Urine Blood   


 


Urine Nitrate   


 


Urine Bilirubin   


 


Urine Urobilinogen   


 


Ur Leukocyte Esterase   


 


Urine RBC   


 


Urine WBC   














Assessment & Plan





- Assessment and Plan (Free Text)


Assessment: 





his is an 81 yo M with PMH of HTN, HLD, COPD, Hx SDH 2/2 fall trauma, CAD s/p 

stenting, Hypothyroidism, Hearing impairment of the right ear, and traumatic 

loss of right eye who presented to [facility] [place], [place] ED for several day history of altered m

ental status/agitation.  He is being worked up for septic vs neurologic cause of

AMS, and is being evaluated for newly discovered AAA.


Plan: 





1) Progressively worsening AMS/Agitation


-Etiology unclear, r/o septic vs acute neurologic etiology


-Likely delirium component to agitation


-Empirically on Vanco/Rocephin, got 1x dose Acyclovir in ED


-Procal pending, blood and urine cx pending


-ID, Neuro, and Psych consulted, appreciate their recs


-Ativan 0.5mg q6 IV prn for agitation, low-dose risperdol nightly


-unable to take PO meds due to altered mental status, switching meds to IV 

formulation where feasible


-Aspiration precaution, head of bed to 30 degrees





2) AAA


-CT abdomen/pelvis concerning for for infrarenal abdominal aortic aneurysm 

measuring 5.4 cm (transverse) and mild aneurysmal dilatation of the distal 

abdominal aorta just above the bifurcation measuring 3.8 cm


-Aggressive BP control, goal is to maintain SBP < 140


-NPO, will attempt BP control with IV Vasotec and PRN Lopessor


-IR consulted for possibility of endovascular repair, appreciate their insights





3) Chronic issues:


-COPD: continue Duonebs, no wheezing so no need for steroids


-CAD s/p stenting: continue ASA, Lipitor


-Cataract surgery: continue eyedrops


-Hypothyroid: continue synthroid





Dispo: pending transfer from ICU to Med/Surg, BP control, pending IR/ID/Neuro 

recs


Ppx: Protonix for GI, SCDs for DVT 





Reviewed and discussed with attending, Dr Tolentino





<Sebastian Tolentino S - Last Filed: 11/27/18 20:15>





Results





- Vital Signs


Recent Vital Signs: 





                                Last Vital Signs











Temp  98.0 F   11/27/18 18:00


 


Pulse  105 H  11/27/18 18:00


 


Resp  29 H  11/27/18 16:04


 


BP  169/96 H  11/27/18 18:00


 


Pulse Ox  91 L  11/27/18 16:04














- Labs


Result Diagrams: 


                                 11/27/18 05:15





                                 11/27/18 05:15


Labs: 





                         Laboratory Results - last 24 hr











  11/27/18 11/27/18 11/27/18





  05:15 05:15 05:15


 


WBC   12.5 H 


 


RBC   4.53 


 


Hgb   14.0 


 


Hct   42.2 


 


MCV   93.2 


 


MCH   30.9 


 


MCHC   33.2 


 


RDW   13.9 


 


Plt Count   212 


 


MPV   9.9 


 


Gran %   76.3 H 


 


Lymph % (Auto)   10.1 L 


 


Mono % (Auto)   12.1 H 


 


Eos % (Auto)   1.3 L 


 


Baso % (Auto)   0.2 


 


Gran #   9.56 H 


 


Lymph # (Auto)   1.3 


 


Mono # (Auto)   1.5 H 


 


Eos # (Auto)   0.2 


 


Baso # (Auto)   0.03 


 


PT    14.2 H


 


INR    1.23


 


Sodium  144  


 


Potassium  3.8  


 


Chloride  107  


 


Carbon Dioxide  26  


 


Anion Gap  15  


 


BUN  12  


 


Creatinine  0.9  


 


Est GFR ( Amer)  > 60  


 


Est GFR (Non-Af Amer)  > 60  


 


Random Glucose  125 H  


 


Calcium  9.4  


 


Phosphorus  3.6  


 


Magnesium  1.8  


 


Total Bilirubin  0.9  


 


AST  23  


 


ALT  27  


 


Alkaline Phosphatase  82  


 


Ammonia   


 


Total Protein  7.5  


 


Albumin  4.1  


 


Globulin  3.4  


 


Albumin/Globulin Ratio  1.2  














  11/27/18





  13:45


 


WBC 


 


RBC 


 


Hgb 


 


Hct 


 


MCV 


 


MCH 


 


MCHC 


 


RDW 


 


Plt Count 


 


MPV 


 


Gran % 


 


Lymph % (Auto) 


 


Mono % (Auto) 


 


Eos % (Auto) 


 


Baso % (Auto) 


 


Gran # 


 


Lymph # (Auto) 


 


Mono # (Auto) 


 


Eos # (Auto) 


 


Baso # (Auto) 


 


PT 


 


INR 


 


Sodium 


 


Potassium 


 


Chloride 


 


Carbon Dioxide 


 


Anion Gap 


 


BUN 


 


Creatinine 


 


Est GFR ( Amer) 


 


Est GFR (Non-Af Amer) 


 


Random Glucose 


 


Calcium 


 


Phosphorus 


 


Magnesium 


 


Total Bilirubin 


 


AST 


 


ALT 


 


Alkaline Phosphatase 


 


Ammonia  < 9 L


 


Total Protein 


 


Albumin 


 


Globulin 


 


Albumin/Globulin Ratio 














Assessment & Plan





- Assessment and Plan (Free Text)


Plan: 





Pt seen and examined. I have reviewed the note of the medical resident and agree

with it. I have discussed the assessment and plan with the resident.  I have 

reviewed the patient's labs and medications. Pt with Delerium. He was started on

IV Abx and will need to wait for BCx and UCx. He has a AAA of 5.4 cm and will 

get IR evaluation. He is going to be on Duoneb for his COPD. He will be on 

Synthroid for hypothyroidism. Continue with ASA on CAD. Pt is on Ativan for 

agitation. ID will be following the pt.

## 2018-11-27 NOTE — CP.PCM.CON
History of Present Illness





- History of Present Illness


History of Present Illness: 


Infectious Disease Consultation:


November 27, 2018





81 yo [place] [facility] [place] several days of AMS and worsening agitation.  

Unable to obtain further information from the patient as he is currently sedated

with Ativan.  History and events obtained from family, staff, and hospital 

chart.  The PMHx includes HTN, COPD, hx of small bilateral subdural hematoma 

secondary to trauma from fall without neurosurgical intervention(4/2018), 

hypothyroidism, HLD, CAD s/p cardiac stent 2003.  As per wife, the patient was 

having odd behavior 6 days prior to admission with disorganized and incoherent 

speech.  The patient has become more agitated and restless each day.  He was 

found to be tachycardic with low grade fevers up to 100.5 F.





He remains confused and is currently in soft restraints.  The patient's wife is 

in the room.  No other sick contacts.  [facility].  He received steroid eye drops and ophthalmic antibiotics 

since that time.  As per the wife, the patient has been able to recognize her 

once in while during her visit.  He is otherwise confused and restless.





PMHx:


HTN, COPD, hx of small bilateral subdural hematoma secondary to trauma from fall

without neurosurgical intervention(4/2018), hypothyroidism, HLD, CAD s/p cardiac

stent 2003





PSHx:


Cataract surgery 11/2018





Allergies:


NKDA





Social Hx:


Ex-tobacco user stopped 10 years ago with 50 pack year history


No EtOH or illicit drug use





Active Medications





Acetaminophen (Tylenol 325mg Tab)  650 mg PO Q6H PRN


   PRN Reason: Fever >100.4 F


Acetaminophen (Tylenol 325 Mg Supp)  325 mg RC Q6H PRN


   PRN Reason: Fever >100.4 F


Albuterol/Ipratropium (Duoneb 3 Mg/0.5 Mg (3 Ml) Ud)  3 ml IH Q2H PRN


   PRN Reason: Shortness of Breath


Albuterol/Ipratropium (Duoneb 3 Mg/0.5 Mg (3 Ml) Ud)  3 ml IH [id number] [facility]


   Last Admin: 11/27/18 13:23 Dose:  3 ml


Aspirin (Ecotrin)  81 mg PO DAILY [facility]


   Last Admin: 11/27/18 09:07 Dose:  Not Given


Atorvastatin Calcium (Lipitor)  20 mg PO DAILY [facility]


   Last Admin: 11/27/18 09:07 Dose:  Not Given


Brimonidine Tartrate (Alphagan P 0.15% Opht)  1 drop OS Q8H [facility]


   Last Admin: 11/27/18 13:34 Dose:  1 applic


Cholecalciferol (Vitamin D)  2,000 intlu PO DAILY [facility]


   Last Admin: 11/27/18 09:09 Dose:  Not Given


Enalaprilat (Vasotec Iv)  1.25 mg IVP Q6 [facility]


   Last Admin: 11/27/18 16:49 Dose:  1.25 mg


Home Med (Home Med)  0 unit OS TID [facility]


   Last Admin: 11/27/18 17:00 Dose:  Not Given


Sodium Chloride (Sodium Chloride 0.9%)  1,000 mls @ 50 mls/hr IV .Q20H [facility]


   Last Admin: 11/27/18 08:37 Dose:  50 mls/hr


Ceftriaxone Sodium (Rocephin 2 Gm Ivpb)  2 gm in 100 mls @ 100 mls/hr IVPB DAILY

[facility]; Protocol


   Last Admin: 11/27/18 13:42 Dose:  100 mls/hr


Vancomycin HCl (Vancomycin 1gm)  1 gm in 250 mls @ 167 mls/hr IVPB Q12H [facility]; 

Protocol


   Last Admin: 11/27/18 13:33 Dose:  167 mls/hr


Levothyroxine Sodium (Synthroid)  125 mcg PO DAILY [facility]


   Last Admin: 11/27/18 09:07 Dose:  Not Given


Lisinopril (Zestril)  20 mg PO DAILY [facility]


   Last Admin: 11/27/18 09:09 Dose:  Not Given


Lorazepam (Ativan)  0.5 mg IVP Q6H PRN; Protocol


   PRN Reason: Anxiety/Agitation


Metoprolol Succinate (Toprol Xl)  50 mg PO DAILY [facility]


   Last Admin: 11/27/18 09:07 Dose:  Not Given


Metoprolol Tartrate (Lopressor)  5 mg IVP Q6 PRN


   PRN Reason: HR > 100


Pantoprazole Sodium (Protonix Inj)  40 mg IVP DAILY [facility]


   Last Admin: 11/27/18 10:12 Dose:  40 mg


Prednisolone Acetate (Pred Forte 1% Opht Susp)  0 ml OS DAILY [facility]


   Last Admin: 11/27/18 16:51 Dose:  1 drop


Risperidone (Risperdal Oral Soln)  0.5 mg PO HS [facility]; Protocol


   Last Admin: 11/26/18 22:25 Dose:  Not Given





Family Hx:


none given





ROS:


Unable to Obtain from the patient.





Past Patient History





- Infectious Disease


Hx of Infectious Diseases: None





- Tetanus Immunizations


Tetanus Immunization: Up to Date





- Past Social History


Smoking Status: Former Smoker


Alcohol: None


Drugs: Denies





- CARDIAC


Hx Cardiac Disorders: Yes (CARDIAC STNEET 2003)


Hx Hypertension: Yes





- PULMONARY


Hx Chronic Obstructive Pulmonary Disease (COPD): Yes





- NEUROLOGICAL


Hx Neurological Disorder: Yes





- HEENT


Hx HEENT Problems: Yes


Hx Blind: Yes (RIGHT EYE)


Hx Cataracts: Yes (LEFT EYE)


Hx Deafness: Yes (RIGHT EAR)





- RENAL


Hx Chronic Kidney Disease: No





- HEMATOLOGICAL/ONCOLOGICAL


Hx Blood Disorders: No





- INTEGUMENTARY


Hx Dermatological Problems: No





- MUSCULOSKELETAL/RHEUMATOLOGICAL


Hx Musculoskeletal Disorders: No


Hx Falls: Yes





- GASTROINTESTINAL


Hx Gastrointestinal Disorders: No





- GENITOURINARY/GYNECOLOGICAL


Hx Genitourinary Disorders: No





- PSYCHIATRIC


Hx Depression: No


Hx Emotional Abuse: No


Hx Physical Abuse: No


Hx Substance Use: No





- SURGICAL HISTORY


Hx Surgeries: Yes (CARDIAC STENT 2003, R EYE SX, AP, T&A)





- ANESTHESIA


Hx Anesthesia: Yes


Hx Anesthesia Reactions: No


Hx Malignant Hyperthermia: No





Meds


Allergies/Adverse Reactions: 


                                    Allergies











Allergy/AdvReac Type Severity Reaction Status Date / Time


 


No Known Allergies Allergy   Verified 04/28/18 15:33














- Medications


Medications: 


                               Current Medications





Acetaminophen (Tylenol 325mg Tab)  650 mg PO Q6H PRN


   PRN Reason: Fever >100.4 F


Acetaminophen (Tylenol 325 Mg Supp)  325 mg RC Q6H PRN


   PRN Reason: Fever >100.4 F


Albuterol/Ipratropium (Duoneb 3 Mg/0.5 Mg (3 Ml) Ud)  3 ml IH Q2H PRN


   PRN Reason: Shortness of Breath


Albuterol/Ipratropium (Duoneb 3 Mg/0.5 Mg (3 Ml) Ud)  3 ml IH [id number] [facility]


   Last Admin: 11/27/18 08:04 Dose:  3 ml


Aspirin (Ecotrin)  81 mg PO DAILY [facility]


   Last Admin: 11/27/18 09:07 Dose:  Not Given


Atorvastatin Calcium (Lipitor)  20 mg PO DAILY [facility]


   Last Admin: 11/27/18 09:07 Dose:  Not Given


Brimonidine Tartrate (Alphagan P 0.15% Opht)  1 drop OS Q8H [facility]


   Last Admin: 11/27/18 05:04 Dose:  1 applic


Cholecalciferol (Vitamin D)  2,000 intlu PO DAILY [facility]


   Last Admin: 11/27/18 09:09 Dose:  Not Given


Sodium Chloride (Sodium Chloride 0.9%)  1,000 mls @ 50 mls/hr IV .Q20H [facility]


   Last Admin: 11/27/18 08:37 Dose:  50 mls/hr


Levothyroxine Sodium (Synthroid)  125 mcg PO DAILY [facility]


   Last Admin: 11/27/18 09:07 Dose:  Not Given


Lisinopril (Zestril)  20 mg PO DAILY [facility]


   Last Admin: 11/27/18 09:09 Dose:  Not Given


Metoprolol Succinate (Toprol Xl)  50 mg PO DAILY [facility]


   Last Admin: 11/27/18 09:07 Dose:  Not Given


Pantoprazole Sodium (Protonix Inj)  40 mg IVP DAILY [facility]


   Last Admin: 11/27/18 10:12 Dose:  40 mg


Risperidone (Risperdal Oral Soln)  0.5 mg PO HS [facility]; Protocol


   Last Admin: 11/26/18 22:25 Dose:  Not Given











Physical Exam





- Constitutional


Appears: Confused, Chronically Ill


Additional comments: 


Restless, Agitated.





- Head Exam


Head Exam: ATRAUMATIC, NORMOCEPHALIC





- Eye Exam


Eye Exam: Normal appearance (left eye... no right eye.)


Pupil Exam: Irregular, Unequal


Additional comments: 


Absent right eye, no discharge or swelling around artificial R eye





- ENT Exam


ENT Exam: Mucous Membranes Moist, Normal External Ear Exam, TM's Normal 

Bilaterally





- Neck Exam


Neck exam: Positive for: Full Rom, Normal Inspection





- Respiratory Exam


Respiratory Exam: Clear to Auscultation Bilateral, NORMAL BREATHING PATTERN.  

absent: Rales, Rhonchi, Wheezes





- Cardiovascular Exam


Cardiovascular Exam: Tachycardia, RRR, +S1, +S2





- GI/Abdominal Exam


GI & Abdominal Exam: Normal Bowel Sounds, Soft.  absent: Distended, Tenderness





- Extremities Exam


Extremities exam: Positive for: full ROM, normal inspection.  Negative for: 

joint swelling, pedal edema





- Neurological Exam


Additional comments: 


Not following commands, restless, confused, incoherent usually during this 

hospitalization.





- Psychiatric Exam


Additional comments: 


Unable to assess.





- Skin


Skin Exam: Dry, Intact, Normal Color





Results





- Vital Signs


Recent Vital Signs: 


                                Last Vital Signs











Temp  98.2 F   11/27/18 11:00


 


Pulse  110 H  11/27/18 11:22


 


Resp  19   11/27/18 11:22


 


BP  173/113 H  11/27/18 11:00


 


Pulse Ox  95   11/27/18 11:10














- Labs


Result Diagrams: 


                                 11/27/18 05:15





                                 11/27/18 05:15


Labs: 


                         Laboratory Results - last 24 hr











  11/26/18 11/26/18 11/26/18





  15:03 15:20 15:20


 


WBC    11.5 H


 


RBC    4.59


 


Hgb    14.5


 


Hct    42.2


 


MCV    91.9


 


MCH    31.6


 


MCHC    34.4


 


RDW    13.6


 


Plt Count    221


 


MPV    9.9


 


Gran %    72.6 H


 


Lymph % (Auto)    15.5 L


 


Mono % (Auto)    10.7 H


 


Eos % (Auto)    1.1 L


 


Baso % (Auto)    0.1


 


Gran #    8.37 H


 


Lymph # (Auto)    1.8


 


Mono # (Auto)    1.2 H


 


Eos # (Auto)    0.1


 


Baso # (Auto)    0.01


 


PT   


 


INR   


 


APTT   


 


pCO2   


 


pO2   


 


HCO3   


 


ABG pH   


 


ABG Total CO2   


 


ABG O2 Saturation   


 


ABG O2 Content   


 


ABG Base Excess   


 


ABG Hemoglobin   


 


ABG Carboxyhemoglobin   


 


POC ABG HHb (Measured)   


 


ABG Methemoglobin   


 


ABG O2 Capacity   


 


Hgb O2 Saturation   


 


FiO2   


 


Sodium   143 


 


Potassium   3.8 


 


Chloride   106 


 


Carbon Dioxide   26 


 


Anion Gap   14 


 


BUN   12 


 


Creatinine   1.0 


 


Est GFR ( Amer)   > 60 


 


Est GFR (Non-Af Amer)   > 60 


 


POC Glucose (mg/dL)  108  


 


Random Glucose   116 H 


 


Calcium   9.6 


 


Phosphorus   


 


Magnesium   1.9 


 


Total Bilirubin   0.8 


 


AST   21 


 


ALT   21 


 


Alkaline Phosphatase   83 


 


Total Protein   7.3 


 


Albumin   4.1 


 


Globulin   3.2 


 


Albumin/Globulin Ratio   1.3 


 


Urine Color   


 


Urine Appearance   


 


Urine pH   


 


Ur Specific Gravity   


 


Urine Protein   


 


Urine Glucose (UA)   


 


Urine Ketones   


 


Urine Blood   


 


Urine Nitrate   


 


Urine Bilirubin   


 


Urine Urobilinogen   


 


Ur Leukocyte Esterase   


 


Urine RBC   


 


Urine WBC   














  11/26/18 11/26/18 11/26/18





  16:19 16:31 18:56


 


WBC   


 


RBC   


 


Hgb   


 


Hct   


 


MCV   


 


MCH   


 


MCHC   


 


RDW   


 


Plt Count   


 


MPV   


 


Gran %   


 


Lymph % (Auto)   


 


Mono % (Auto)   


 


Eos % (Auto)   


 


Baso % (Auto)   


 


Gran #   


 


Lymph # (Auto)   


 


Mono # (Auto)   


 


Eos # (Auto)   


 


Baso # (Auto)   


 


PT    14.0 H


 


INR    1.22


 


APTT    32.3


 


pCO2   33 L 


 


pO2   66.0 L 


 


HCO3   24.0 


 


ABG pH   7.47 H 


 


ABG Total CO2   25.0 


 


ABG O2 Saturation   95.7 


 


ABG O2 Content   19.0 


 


ABG Base Excess   1.0 


 


ABG Hemoglobin   14.6 


 


ABG Carboxyhemoglobin   2.4 H 


 


POC ABG HHb (Measured)   4.2 


 


ABG Methemoglobin   1.0 


 


ABG O2 Capacity   19.9 


 


Hgb O2 Saturation   92.4 L 


 


FiO2   21.0 


 


Sodium   


 


Potassium   


 


Chloride   


 


Carbon Dioxide   


 


Anion Gap   


 


BUN   


 


Creatinine   


 


Est GFR ( Amer)   


 


Est GFR (Non-Af Amer)   


 


POC Glucose (mg/dL)   


 


Random Glucose   


 


Calcium   


 


Phosphorus   


 


Magnesium   


 


Total Bilirubin   


 


AST   


 


ALT   


 


Alkaline Phosphatase   


 


Total Protein   


 


Albumin   


 


Globulin   


 


Albumin/Globulin Ratio   


 


Urine Color  Yellow  


 


Urine Appearance  Sl cloudy  


 


Urine pH  6.0  


 


Ur Specific Gravity  1.015  


 


Urine Protein  Trace H  


 


Urine Glucose (UA)  Negative  


 


Urine Ketones  Negative  


 


Urine Blood  Moderate H  


 


Urine Nitrate  Negative  


 


Urine Bilirubin  Negative  


 


Urine Urobilinogen  0.2  


 


Ur Leukocyte Esterase  Negative  


 


Urine RBC  25 - 30  


 


Urine WBC  0 - 2  














  11/27/18 11/27/18 11/27/18





  05:15 05:15 05:15


 


WBC   12.5 H 


 


RBC   4.53 


 


Hgb   14.0 


 


Hct   42.2 


 


MCV   93.2 


 


MCH   30.9 


 


MCHC   33.2 


 


RDW   13.9 


 


Plt Count   212 


 


MPV   9.9 


 


Gran %   76.3 H 


 


Lymph % (Auto)   10.1 L 


 


Mono % (Auto)   12.1 H 


 


Eos % (Auto)   1.3 L 


 


Baso % (Auto)   0.2 


 


Gran #   9.56 H 


 


Lymph # (Auto)   1.3 


 


Mono # (Auto)   1.5 H 


 


Eos # (Auto)   0.2 


 


Baso # (Auto)   0.03 


 


PT    14.2 H


 


INR    1.23


 


APTT   


 


pCO2   


 


pO2   


 


HCO3   


 


ABG pH   


 


ABG Total CO2   


 


ABG O2 Saturation   


 


ABG O2 Content   


 


ABG Base Excess   


 


ABG Hemoglobin   


 


ABG Carboxyhemoglobin   


 


POC ABG HHb (Measured)   


 


ABG Methemoglobin   


 


ABG O2 Capacity   


 


Hgb O2 Saturation   


 


FiO2   


 


Sodium  144  


 


Potassium  3.8  


 


Chloride  107  


 


Carbon Dioxide  26  


 


Anion Gap  15  


 


BUN  12  


 


Creatinine  0.9  


 


Est GFR ( Amer)  > 60  


 


Est GFR (Non-Af Amer)  > 60  


 


POC Glucose (mg/dL)   


 


Random Glucose  125 H  


 


Calcium  9.4  


 


Phosphorus  3.6  


 


Magnesium  1.8  


 


Total Bilirubin  0.9  


 


AST  23  


 


ALT  27  


 


Alkaline Phosphatase  82  


 


Total Protein  7.5  


 


Albumin  4.1  


 


Globulin  3.4  


 


Albumin/Globulin Ratio  1.2  


 


Urine Color   


 


Urine Appearance   


 


Urine pH   


 


Ur Specific Gravity   


 


Urine Protein   


 


Urine Glucose (UA)   


 


Urine Ketones   


 


Urine Blood   


 


Urine Nitrate   


 


Urine Bilirubin   


 


Urine Urobilinogen   


 


Ur Leukocyte Esterase   


 


Urine RBC   


 


Urine WBC   














Assessment & Plan





- Assessment and Plan (Free Text)


Assessment: 


81 yo  male brought in for AMS and Confusion.  CT of Abdomen showing 

Infrarenal Aortic Hernia and right lung base consolidation suggestive of a 

pneumonia.  On Rocephin and Vancomycin IV.  CT head did not show any bleeding in

the brain.  Procalcitonin sent.  Supportive care.  Pan cultures sent.  Mild 

leukocytosis.





Awaiting ammonia and tox screen.





Patient with Hypothyroidism, CAD, COPD, and severe hearing impairment.





An LP may be useful on this patient.





Thank you for allowing me to participate in the care of the patient, we will 

follow with you.

## 2018-11-27 NOTE — CP.PCM.PN
Subjective





- Date & Time of Evaluation


Date of Evaluation: 11/27/18


Time of Evaluation: 13:10





- Subjective


Subjective: 





Pt seen and examined at bedside, pt in bilateral wrist restaints and agitated, 

speech is incoherent. 


ROS unobtainable at this point but pt appears to be in no acute distress.








Objective





- Vital Signs/Intake and Output


Vital Signs (last 24 hours): 


                                        











Temp Pulse Resp BP Pulse Ox


 


 98.2 F   110 H  19   173/113 H  95 


 


 11/27/18 11:00  11/27/18 11:22  11/27/18 11:22  11/27/18 11:00  11/27/18 11:10








Intake and Output: 


                                        











 11/27/18 11/27/18





 06:59 18:59


 


Intake Total 350 


 


Output Total 400 


 


Balance -50 














- Medications


Medications: 


                               Current Medications





Acetaminophen (Tylenol 325mg Tab)  650 mg PO Q6H PRN


   PRN Reason: Fever >100.4 F


Acetaminophen (Tylenol 325 Mg Supp)  325 mg RC Q6H PRN


   PRN Reason: Fever >100.4 F


Albuterol/Ipratropium (Duoneb 3 Mg/0.5 Mg (3 Ml) Ud)  3 ml IH Q2H PRN


   PRN Reason: Shortness of Breath


Albuterol/Ipratropium (Duoneb 3 Mg/0.5 Mg (3 Ml) Ud)  3 ml IH [id number] [facility]


   Last Admin: 11/27/18 08:04 Dose:  3 ml


Aspirin (Ecotrin)  81 mg PO DAILY [facility]


   Last Admin: 11/27/18 09:07 Dose:  Not Given


Atorvastatin Calcium (Lipitor)  20 mg PO DAILY [facility]


   Last Admin: 11/27/18 09:07 Dose:  Not Given


Brimonidine Tartrate (Alphagan P 0.15% Opht)  1 drop OS Q8H [facility]


   Last Admin: 11/27/18 05:04 Dose:  1 applic


Cholecalciferol (Vitamin D)  2,000 intlu PO DAILY [facility]


   Last Admin: 11/27/18 09:09 Dose:  Not Given


Sodium Chloride (Sodium Chloride 0.9%)  1,000 mls @ 50 mls/hr IV .Q20H [facility]


   Last Admin: 11/27/18 08:37 Dose:  50 mls/hr


Ceftriaxone Sodium (Rocephin 2 Gm Ivpb)  2 gm in 100 mls @ 100 mls/hr IVPB DAILY

[facility]; Protocol


Vancomycin HCl (Vancomycin 1gm)  1 gm in 250 mls @ 167 mls/hr IVPB Q12H [facility]; 

Protocol


Levothyroxine Sodium (Synthroid)  125 mcg PO DAILY [facility]


   Last Admin: 11/27/18 09:07 Dose:  Not Given


Lisinopril (Zestril)  20 mg PO DAILY [facility]


   Last Admin: 11/27/18 09:09 Dose:  Not Given


Metoprolol Succinate (Toprol Xl)  50 mg PO DAILY [facility]


   Last Admin: 11/27/18 09:07 Dose:  Not Given


Pantoprazole Sodium (Protonix Inj)  40 mg IVP DAILY [facility]


   Last Admin: 11/27/18 10:12 Dose:  40 mg


Risperidone (Risperdal Oral Soln)  0.5 mg PO HS [facility]; Protocol


   Last Admin: 11/26/18 22:25 Dose:  Not Given











- Labs


Labs: 


                                        





                                 11/27/18 05:15 





                                 11/27/18 05:15 





                                        











PT  14.2 SECONDS (9.4-12.5)  H  11/27/18  05:15    


 


INR  1.23   11/27/18  05:15    


 


APTT  32.3 Seconds (25.1-36.5)   11/26/18  18:56    














- Constitutional


Appears: No Acute Distress, Older Than Stated Age, Agitated





- Head Exam


Head Exam: ATRAUMATIC, NORMAL INSPECTION, NORMOCEPHALIC





- Eye Exam


Pupil Exam: NORMAL ACCOMODATION





- ENT Exam


ENT Exam: Mucous Membranes Moist





- Neck Exam


Neck Exam: Normal Inspection





- GI/Abdominal Exam


GI & Abdominal Exam: Normal Bowel Sounds





- Rectal Exam


Rectal Exam: Deferred





- Extremities Exam


Extremities Exam: Normal Capillary Refill





- Neurological Exam


Neurological Exam: Altered





- Skin


Skin Exam: Dry, Intact





Assessment and Plan





- Assessment and Plan (Free Text)


Assessment: 





82 yr old male with pmh sig for htn, copd, HLD, exsmoker, CAd with stents, 

bilateral subderal hematomas secondary to trauma from fall (4/2018) now p

resesnted to [facility] with family for several days of AMS and worsening agitation. 





Neuro and physiatrist consultations on board with workup in place for Ams, 

manishaois with ID consultation noted from Dr Rawls,  


head ct with acute hemorrhage, 


chest xray with no active disease, 


ct abd reveals sever eemphysema and infrarenal abd aortic aneurysm of 5.4cm with

airspace consolidation at right lung base representing pneumonia. 





pt is currently recevinign IV ceftriaxone and Iv Vanco q12. 





BC, UC and procal levels pending, abg reviewed. will cehck ammonia level and 

assess if tox screen was obtained or EEg warrented to r/o seizure 


will continuie to follow pt clinical status and dscuss plan of care with pmd and

consultants. 





Devika Ba, DNP, APN

## 2018-11-27 NOTE — CP.PCM.PCO
Physician Communication Note





- Physician Communication Note


Physician Communication Note: Chart reviewed Spoke w/Nurse Clair. Ordered 

Procalcitonin. consult to monica

## 2018-11-28 LAB
ALBUMIN SERPL-MCNC: 4 G/DL (ref 3–4.8)
ALBUMIN/GLOB SERPL: 1.2 {RATIO} (ref 1.1–1.8)
ALT SERPL-CCNC: 35 U/L (ref 7–56)
AST SERPL-CCNC: 43 U/L (ref 17–59)
BASOPHILS # BLD AUTO: 0.01 K/MM3 (ref 0–2)
BASOPHILS NFR BLD: 0.1 % (ref 0–3)
BUN SERPL-MCNC: 19 MG/DL (ref 7–21)
CALCIUM SERPL-MCNC: 9.2 MG/DL (ref 8.4–10.5)
EOSINOPHIL # BLD: 0 10*3/UL (ref 0–0.7)
EOSINOPHIL NFR BLD: 0 % (ref 1.5–5)
ERYTHROCYTE [DISTWIDTH] IN BLOOD BY AUTOMATED COUNT: 13.8 % (ref 11.5–14.5)
GFR NON-AFRICAN AMERICAN: > 60
GRANULOCYTES # BLD: 13.62 10*3/UL (ref 1.4–6.5)
GRANULOCYTES NFR BLD: 83.4 % (ref 50–68)
HGB BLD-MCNC: 13.5 G/DL (ref 14–18)
LYMPHOCYTES # BLD: 1.2 10*3/UL (ref 1.2–3.4)
LYMPHOCYTES NFR BLD AUTO: 7.2 % (ref 22–35)
MCH RBC QN AUTO: 31.5 PG (ref 25–35)
MCHC RBC AUTO-ENTMCNC: 34.1 G/DL (ref 31–37)
MCV RBC AUTO: 92.3 FL (ref 80–105)
MONOCYTES # BLD AUTO: 1.5 10*3/UL (ref 0.1–0.6)
MONOCYTES NFR BLD: 9.3 % (ref 1–6)
PLATELET # BLD: 251 10^3/UL (ref 120–450)
PMV BLD AUTO: 9.7 FL (ref 7–11)
RBC # BLD AUTO: 4.29 10^6/UL (ref 3.5–6.1)
WBC # BLD AUTO: 16.3 10^3/UL (ref 4.5–11)

## 2018-11-28 RX ADMIN — ENALAPRILAT SCH MG: 1.25 INJECTION, SOLUTION INTRAVENOUS at 06:54

## 2018-11-28 RX ADMIN — ENALAPRILAT SCH MG: 1.25 INJECTION, SOLUTION INTRAVENOUS at 11:58

## 2018-11-28 RX ADMIN — BRIMONIDINE TARTRATE SCH APPLIC: 1.5 SOLUTION/ DROPS OPHTHALMIC at 05:30

## 2018-11-28 RX ADMIN — BRIMONIDINE TARTRATE SCH DROP: 1.5 SOLUTION/ DROPS OPHTHALMIC at 22:26

## 2018-11-28 RX ADMIN — IPRATROPIUM BROMIDE AND ALBUTEROL SULFATE SCH ML: .5; 3 SOLUTION RESPIRATORY (INHALATION) at 13:35

## 2018-11-28 RX ADMIN — BRIMONIDINE TARTRATE SCH DROP: 1.5 SOLUTION/ DROPS OPHTHALMIC at 22:28

## 2018-11-28 RX ADMIN — ENALAPRILAT SCH MG: 1.25 INJECTION, SOLUTION INTRAVENOUS at 17:21

## 2018-11-28 RX ADMIN — NYSTATIN SCH APPLIC: 100000 POWDER TOPICAL at 22:25

## 2018-11-28 RX ADMIN — BRIMONIDINE TARTRATE SCH DROP: 1.5 SOLUTION/ DROPS OPHTHALMIC at 12:51

## 2018-11-28 RX ADMIN — IPRATROPIUM BROMIDE AND ALBUTEROL SULFATE SCH ML: .5; 3 SOLUTION RESPIRATORY (INHALATION) at 07:28

## 2018-11-28 RX ADMIN — CEFTRIAXONE SCH MLS/HR: 2 INJECTION, POWDER, FOR SOLUTION INTRAMUSCULAR; INTRAVENOUS at 09:39

## 2018-11-28 RX ADMIN — NYSTATIN SCH APPLIC: 100000 POWDER TOPICAL at 11:58

## 2018-11-28 RX ADMIN — VITAMIN D, TAB 1000IU (100/BT) SCH: 25 TAB at 09:38

## 2018-11-28 RX ADMIN — VANCOMYCIN HYDROCHLORIDE SCH MLS/HR: 1 INJECTION, POWDER, LYOPHILIZED, FOR SOLUTION INTRAVENOUS at 02:25

## 2018-11-28 RX ADMIN — NYSTATIN SCH APPLIC: 100000 POWDER TOPICAL at 05:15

## 2018-11-28 RX ADMIN — IPRATROPIUM BROMIDE AND ALBUTEROL SULFATE SCH ML: .5; 3 SOLUTION RESPIRATORY (INHALATION) at 19:47

## 2018-11-28 RX ADMIN — PREDNISOLONE ACETATE SCH DROP: 10 SUSPENSION/ DROPS OPHTHALMIC at 09:31

## 2018-11-28 RX ADMIN — VANCOMYCIN HYDROCHLORIDE SCH MLS/HR: 1 INJECTION, POWDER, LYOPHILIZED, FOR SOLUTION INTRAVENOUS at 12:49

## 2018-11-28 RX ADMIN — IPRATROPIUM BROMIDE AND ALBUTEROL SULFATE SCH ML: .5; 3 SOLUTION RESPIRATORY (INHALATION) at 01:31

## 2018-11-28 RX ADMIN — ENALAPRILAT SCH MG: 1.25 INJECTION, SOLUTION INTRAVENOUS at 05:15

## 2018-11-28 NOTE — CP.PCM.PN
Subjective





- Date & Time of Evaluation


Date of Evaluation: 11/28/18


Time of Evaluation: 11:00





- Subjective


Subjective: 


Infectious Disease Follow Up:


November 28, 2018





83 yo male presented to [facility] with several days of AMS and worsening agitation.  

Unable to obtain further information from the patient as he is currently sedated

with Ativan.  History and events obtained from family, staff, and hospital 

chart.  The PMHx includes HTN, COPD, hx of small bilateral subdural hematoma 

secondary to trauma from fall without neurosurgical intervention(4/2018), 

hypothyroidism, HLD, CAD s/p cardiac stent 2003.  As per wife, the patient was 

having odd behavior 6 days prior to admission with disorganized and incoherent 

speech.  The patient has become more agitated and restless each day.  He was 

found to be tachycardic with low grade fevers up to 100.5 F.





He remains confused and is currently in soft restraints.  The patient's wife is 

in the room.  No other sick contacts.  [facility].  He received steroid eye drops and ophthalmic antibiotics 

since that time.  As per the wife, the patient has been able to recognize her 

once in while during her visit.  He is otherwise confused and restless.





No improvement mentally from yesterday.  WBC increased.





On Rocephin and Vancomycin IV.





Objective





- Vital Signs/Intake and Output


Vital Signs (last 24 hours): 


                                        











Temp Pulse Resp BP Pulse Ox


 


 98 F   100 H  18   165/97 H  94 L


 


 11/28/18 16:58  11/28/18 17:17  11/28/18 16:58  11/28/18 17:21  11/28/18 16:58








Intake and Output: 


                                        











 11/28/18 11/28/18





 06:59 18:59


 


Intake Total 300 


 


Output Total 0 


 


Balance 300 














- Medications


Medications: 


                               Current Medications





Acetaminophen (Tylenol 325mg Tab)  650 mg PO Q6H PRN


   PRN Reason: Fever >100.4 F


Acetaminophen (Tylenol 325 Mg Supp)  325 mg RC Q6H PRN


   PRN Reason: Fever >100.4 F


Albuterol/Ipratropium (Duoneb 3 Mg/0.5 Mg (3 Ml) Ud)  3 ml IH Q2H PRN


   PRN Reason: Shortness of Breath


Albuterol/Ipratropium (Duoneb 3 Mg/0.5 Mg (3 Ml) Ud)  3 ml IH [id number] [facility]


   Last Admin: 11/28/18 13:35 Dose:  3 ml


Amlodipine Besylate (Norvasc)  10 mg PO DAILY [facility]


   Last Admin: 11/28/18 17:21 Dose:  Not Given


Aspirin (Ecotrin)  81 mg PO DAILY [facility]


   Last Admin: 11/28/18 09:38 Dose:  Not Given


Atorvastatin Calcium (Lipitor)  40 mg PO DAILY [facility]


Brimonidine Tartrate (Alphagan P 0.15% Opht)  1 drop OS Q8H [facility]


   Last Admin: 11/28/18 12:51 Dose:  1 drop


Cholecalciferol (Vitamin D)  2,000 intlu PO DAILY [facility]


   Last Admin: 11/28/18 09:38 Dose:  Not Given


Clonidine HCl (Catapres-Tts2 0.2 Mg/24 Hr)  1 patch TD Q7D@1000 [facility]


   Last Admin: 11/28/18 17:17 Dose:  1 patch


Diltiazem HCl (Cardizem)  30 mg PO QID [facility]


   Last Admin: 11/28/18 17:21 Dose:  Not Given


Enalaprilat (Vasotec Iv)  1.25 mg IVP Q6 [facility]


   Last Admin: 11/28/18 17:21 Dose:  1.25 mg


Home Med (Home Med)  0 unit OS TID [facility]


   Last Admin: 11/28/18 17:21 Dose:  Not Given


Hydralazine HCl (Apresoline)  10 mg IVP Q6 PRN


   PRN Reason: SBP > 160


Sodium Chloride (Sodium Chloride 0.9%)  1,000 mls @ 50 mls/hr IV .Q20H [facility]


   Last Admin: 11/28/18 05:20 Dose:  50 mls/hr


Ceftriaxone Sodium (Rocephin 2 Gm Ivpb)  2 gm in 100 mls @ 100 mls/hr IVPB DAILY

[facility]; Protocol


   Last Admin: 11/28/18 09:39 Dose:  100 mls/hr


Vancomycin HCl (Vancomycin 1gm)  1 gm in 250 mls @ 167 mls/hr IVPB Q12H [facility]; 

Protocol


   Last Admin: 11/28/18 12:49 Dose:  167 mls/hr


Levothyroxine Sodium (Synthroid)  125 mcg PO DAILY [facility]


   Last Admin: 11/28/18 09:38 Dose:  Not Given


Lisinopril (Zestril)  20 mg PO DAILY [facility]


   Last Admin: 11/27/18 09:09 Dose:  Not Given


Lorazepam (Ativan)  1 mg IVP Q6H PRN; Protocol


   PRN Reason: Anxiety/Agitation


   Last Admin: 11/28/18 15:28 Dose:  1 mg


Metoprolol Succinate (Toprol Xl)  50 mg PO DAILY [facility]


   Last Admin: 11/27/18 09:07 Dose:  Not Given


Nystatin (Nystop Topical Powder)  0 gm TOP Q8H [facility]


   Last Admin: 11/28/18 11:58 Dose:  1 applic


Pantoprazole Sodium (Protonix Inj)  40 mg IVP DAILY [facility]


   Last Admin: 11/28/18 09:49 Dose:  40 mg


Prednisolone Acetate (Pred Forte 1% Opht Susp)  0 ml OS DAILY [facility]


   Last Admin: 11/28/18 09:31 Dose:  2 drop


Risperidone (Risperdal Oral Soln)  0.5 mg PO HS [facility]; Protocol


   Last Admin: 11/28/18 06:14 Dose:  Not Given


Thiamine HCl (Vitamin B1 Tab)  100 mg PO BID [facility]


   Last Admin: 11/28/18 17:22 Dose:  Not Given


Ziprasidone (Geodon Inj)  10 mg IM Q12 PRN; Protocol


   PRN Reason: severe agitaiton/psychosis


   Last Admin: 11/28/18 11:49 Dose:  10 mg











- Labs


Labs: 


                                        





                                 11/28/18 09:30 





                                 11/28/18 09:30 





                                        











PT  14.2 SECONDS (9.4-12.5)  H  11/27/18  05:15    


 


INR  1.23   11/27/18  05:15    


 


APTT  32.3 Seconds (25.1-36.5)   11/26/18  18:56    














- Constitutional


Appears: Confused, Chronically Ill





- Head Exam


Head Exam: ATRAUMATIC, NORMOCEPHALIC





- Eye Exam


Eye Exam: Normal appearance (left eye)


Pupil Exam: Irregular, NORMAL ACCOMODATION, PERRL, Unequal


Additional comments: 


Patient has a glass/prosthetic right eye.





- ENT Exam


ENT Exam: Mucous Membranes Moist, Normal External Ear Exam, TM's Normal 

Bilaterally





- Neck Exam


Neck Exam: Full ROM, Normal Inspection





- Respiratory Exam


Respiratory Exam: Clear to Ausculation Bilateral, NORMAL BREATHING PATTERN.  

absent: Rales, Rhonchi, Wheezes





- Cardiovascular Exam


Cardiovascular Exam: REGULAR RHYTHM, RRR, +S1, +S2





- GI/Abdominal Exam


GI & Abdominal Exam: Soft, Normal Bowel Sounds.  absent: Distended, Tenderness





- Extremities Exam


Extremities Exam: Full ROM, Normal Inspection





- Neurological Exam


Additional comments: 


Not following commands, restless, confused, incoherent usually during this 

hospitalization.








- Psychiatric Exam


Additional comments: 





Unable to access.





- Skin


Skin Exam: Dry, Intact, Normal Color





Assessment and Plan





- Assessment and Plan (Free Text)


Assessment: 


83 yo  male brought in for AMS and Confusion.  CT of Abdomen showing 

Infrarenal Aortic Hernia and right lung base consolidation suggestive of a 

pneumonia.  On Rocephin and Vancomycin IV.  CT head did not show any bleeding in

the brain.  Procalcitonin sent.  Supportive care.  Pan cultures sent.  Mild 

leukocytosis.





Awaiting ammonia and tox screen.  Procalcitonin <0.05.  This argues against a 

septic or pneumonic process.  Ammonia < 9.





Patient with Hypothyroidism, CAD, COPD, and severe hearing impairment.





An LP may be useful on this patient.





If no improvement in the next 24 hours, will consider change of Rocephin to 

Zosyn for antibiotic coverage.





Thank you for allowing me to participate in the care of the patient, we will 

follow with you.

## 2018-11-28 NOTE — CP.PCM.PN
<Fredo Lucero - Last Filed: 11/28/18 14:46>





Subjective





- Date & Time of Evaluation


Date of Evaluation: 11/28/18


Time of Evaluation: 07:00





- Subjective


Subjective: 





Progress Note for Dr. Tolentino Service





Patient seen and examined at bedside.  As per nursing, remains confused, 

agitated, kicking up legs and trying to slide out of bed.  Still with illogical 

speech, but slightly more coherent words today, articulating better.  Not 

following any commands.





Objective





- Vital Signs/Intake and Output


Vital Signs (last 24 hours): 


                                        











Temp Pulse Resp BP Pulse Ox


 


 97.9 F   98 H  25 H  170/80 H  92 L


 


 11/28/18 07:09  11/28/18 06:00  11/28/18 06:00  11/28/18 06:54  11/28/18 06:00








Intake and Output: 


                                        











 11/28/18 11/28/18





 06:59 18:59


 


Intake Total 300 


 


Output Total 0 


 


Balance 300 














- Medications


Medications: 


                               Current Medications





Acetaminophen (Tylenol 325mg Tab)  650 mg PO Q6H PRN


   PRN Reason: Fever >100.4 F


Acetaminophen (Tylenol 325 Mg Supp)  325 mg RC Q6H PRN


   PRN Reason: Fever >100.4 F


Albuterol/Ipratropium (Duoneb 3 Mg/0.5 Mg (3 Ml) Ud)  3 ml IH Q2H PRN


   PRN Reason: Shortness of Breath


Albuterol/Ipratropium (Duoneb 3 Mg/0.5 Mg (3 Ml) Ud)  3 ml IH [id number] [facility]


   Last Admin: 11/28/18 07:28 Dose:  3 ml


Aspirin (Ecotrin)  81 mg PO DAILY [facility]


   Last Admin: 11/28/18 09:38 Dose:  Not Given


Atorvastatin Calcium (Lipitor)  20 mg PO DAILY [facility]


   Last Admin: 11/28/18 09:38 Dose:  Not Given


Brimonidine Tartrate (Alphagan P 0.15% Opht)  1 drop OS Q8H [facility]


   Last Admin: 11/28/18 05:30 Dose:  1 applic


Cholecalciferol (Vitamin D)  2,000 intlu PO DAILY [facility]


   Last Admin: 11/28/18 09:38 Dose:  Not Given


Clonidine HCl (Catapres Tts1 0.1 Mg/24 Hr)  1 patch TD Q7D@1000 [facility]


   Last Admin: 11/28/18 09:46 Dose:  1 patch


Enalaprilat (Vasotec Iv)  1.25 mg IVP Q6 [facility]


   Last Admin: 11/28/18 06:54 Dose:  1.25 mg


Home Med (Home Med)  0 unit OS TID [facility]


   Last Admin: 11/28/18 09:28 Dose:  1 unit


Sodium Chloride (Sodium Chloride 0.9%)  1,000 mls @ 50 mls/hr IV .Q20H [facility]


   Last Admin: 11/28/18 05:20 Dose:  50 mls/hr


Ceftriaxone Sodium (Rocephin 2 Gm Ivpb)  2 gm in 100 mls @ 100 mls/hr IVPB DAILY

[facility]; Protocol


   Last Admin: 11/28/18 09:39 Dose:  100 mls/hr


Vancomycin HCl (Vancomycin 1gm)  1 gm in 250 mls @ 167 mls/hr IVPB Q12H [facility]; 

Protocol


   Last Admin: 11/28/18 02:25 Dose:  167 mls/hr


Levothyroxine Sodium (Synthroid)  125 mcg PO DAILY [facility]


   Last Admin: 11/28/18 09:38 Dose:  Not Given


Lisinopril (Zestril)  20 mg PO DAILY [facility]


   Last Admin: 11/27/18 09:09 Dose:  Not Given


Lorazepam (Ativan)  0.5 mg IVP Q6H PRN; Protocol


   PRN Reason: Anxiety/Agitation


   Last Admin: 11/28/18 09:56 Dose:  0.5 mg


Metoprolol Succinate (Toprol Xl)  50 mg PO DAILY [facility]


   Last Admin: 11/27/18 09:07 Dose:  Not Given


Nystatin (Nystop Topical Powder)  0 gm TOP Q8H [facility]


   Last Admin: 11/28/18 05:15 Dose:  1 applic


Pantoprazole Sodium (Protonix Inj)  40 mg IVP DAILY [facility]


   Last Admin: 11/28/18 09:49 Dose:  40 mg


Prednisolone Acetate (Pred Forte 1% Opht Susp)  0 ml OS DAILY [facility]


   Last Admin: 11/28/18 09:31 Dose:  2 drop


Risperidone (Risperdal Oral Soln)  0.5 mg PO HS [facility]; Protocol


   Last Admin: 11/28/18 06:14 Dose:  Not Given


Ziprasidone (Geodon Inj)  10 mg IM Q12 PRN; Protocol


   PRN Reason: severe agitaiton/psychosis











- Labs


Labs: 


                                        





                                 11/28/18 09:30 





                                 11/28/18 09:30 





                                        











PT  14.2 SECONDS (9.4-12.5)  H  11/27/18  05:15    


 


INR  1.23   11/27/18  05:15    


 


APTT  32.3 Seconds (25.1-36.5)   11/26/18  18:56    














- Additional Findings


Additional findings: 





- Constitutional


Appears: Non-toxic, Confused (incoherent speech, confused, not following 

commands), Chronically Ill





- Head Exam


Head Exam: ATRAUMATIC, NORMAL INSPECTION, NORMOCEPHALIC





- Eye Exam


Eye Exam: Normal appearance (Left eye normal appearance).  absent: Conjunctival 

injection, Scleral icterus


Pupil Exam: absent: Irregular, Unequal


Absent right eye, no discharge or swelling around R eye socket





- ENT Exam


ENT Exam: Mucous Membranes Moist





- Neck Exam


Neck exam: Positive for: Full Rom (passively, not following commands)





- Respiratory Exam


Respiratory Exam: Clear to Auscultation Bilateral, NORMAL BREATHING PATTERN.  

absent: Accessory Muscle Use, Decreased Breath Sounds, Rales, Rhonchi, Wheezes





- Cardiovascular Exam


Cardiovascular Exam: Tachycardia (tachy to 110's on bedside monitor), REGULAR 

RHYTHM, +S1, +S2.  absent: Bradycardia, Irregular Rhythm, JVD, +S4





- GI/Abdominal Exam


GI & Abdominal Exam: Normal Bowel Sounds, Soft.  absent: Diminished Bowel 

Sounds, Distended, Firm, Hyperactive Bowel Sounds, Hypoactive Bowel Sounds, 

Rigid





- Extremities Exam


Extremities exam: Positive for: normal capillary refill, pedal pulses present.  

Negative for: pedal edema





- Neurological Exam


altered, not following commands, attempting to speak but words are less 

incoherent, intermittently moving extremities spontaneously and trying to slide 

out of bed





- Psychiatric Exam


unable to assess, altered and incoherent





- Skin


Skin Exam: Dry, Intact, Normal Color, Warm





Assessment and Plan





- Assessment and Plan (Free Text)


Assessment: 





This is an 83 yo M with PMH of HTN, HLD, COPD, Hx SDH 2/2 fall trauma, CAD s/p 

stenting, Hypothyroidism, Hearing impairment of the right ear, and traumatic 

loss of right eye who presented [place] [facility] for several day history of altered 

mental status/agitation.  He is being worked up for septic vs neurologic cause 

of AMS, and is being evaluated for newly discovered AAA.


Plan: 





1) Progressively worsening AMS/Agitation - improving


-more awake and active, less incoherent but still illogical speech


-Etiology unclear, r/o septic vs acute neurologic etiology


   Likely delirium component to agitation


-Empirically on Vanco/Rocephin


-procal negative, blood and urine cx negative currently


-As per ID, may require LP


-As per Neuro, likely toxic metabolic encephalopathy, MRI brain, thiamine bid, 

BP control with goals 130's-140's/70's-80's


-As per Psych: ativan and geodon prn, risperidone qHS


-unable to take PO meds due to altered mental status, switching meds to IV 

formulation where feasible


-Aspiration precaution, head of bed to 30 degrees





2) AAA


-CT abdomen/pelvis concerning for for infrarenal abdominal aortic aneurysm 

measuring 5.4 cm (transverse) and mild aneurysmal dilatation of the distal 

abdominal aorta just above the bifurcation measuring 3.8 cm


-Aggressive BP control, goal is to maintain SBP < 140


-NPO, will attempt BP control with IV Vasotec and PRN Lopessor


   Vasotec q6, cardizem q6, amlodipine daily, hydralazine PRN


-IR consulted for possibility of endovascular repair, appreciate their insights





3) Chronic issues:


-COPD: continue Duonebs, no wheezing so no need for steroids


-CAD s/p stenting: continue ASA, Lipitor


-Cataract surgery: continue eyedrops


-Hypothyroid: continue synthroid





Dispo: pending transfer from ICU to Med/Surg, BP control, pending IR/ID/Neuro 

recs


Ppx: Protonix for GI, SCDs for DVT 





Reviewed and discussed with attending, Dr Tolentino





<Sebastian Tolentino S - Last Filed: 11/28/18 21:35>





Objective





- Vital Signs/Intake and Output


Vital Signs (last 24 hours): 


                                        











Temp Pulse Resp BP Pulse Ox


 


 98 F   100 H  18   165/97 H  94 L


 


 11/28/18 16:58  11/28/18 17:17  11/28/18 16:58  11/28/18 17:21  11/28/18 16:58











- Medications


Medications: 


                               Current Medications





Acetaminophen (Tylenol 325mg Tab)  650 mg PO Q6H PRN


   PRN Reason: Fever >100.4 F


Acetaminophen (Tylenol 325 Mg Supp)  325 mg RC Q6H PRN


   PRN Reason: Fever >100.4 F


Albuterol/Ipratropium (Duoneb 3 Mg/0.5 Mg (3 Ml) Ud)  3 ml IH Q2H PRN


   PRN Reason: Shortness of Breath


Albuterol/Ipratropium (Duoneb 3 Mg/0.5 Mg (3 Ml) Ud)  3 ml IH [id number] [facility]


   Last Admin: 11/28/18 19:47 Dose:  3 ml


Amlodipine Besylate (Norvasc)  10 mg PO DAILY [facility]


   Last Admin: 11/28/18 17:21 Dose:  Not Given


Aspirin (Ecotrin)  81 mg PO DAILY [facility]


   Last Admin: 11/28/18 09:38 Dose:  Not Given


Atorvastatin Calcium (Lipitor)  40 mg PO DAILY [facility]


Brimonidine Tartrate (Alphagan P 0.15% Opht)  1 drop OS Q8H [facility]


   Last Admin: 11/28/18 12:51 Dose:  1 drop


Cholecalciferol (Vitamin D)  2,000 intlu PO DAILY [facility]


   Last Admin: 11/28/18 09:38 Dose:  Not Given


Clonidine HCl (Catapres-Tts2 0.2 Mg/24 Hr)  1 patch TD Q7D@1000 [facility]


   Last Admin: 11/28/18 17:17 Dose:  1 patch


Diltiazem HCl (Cardizem)  30 mg PO QID [facility]


   Last Admin: 11/28/18 17:21 Dose:  Not Given


Enalaprilat (Vasotec Iv)  1.25 mg IVP Q6 [facility]


   Last Admin: 11/28/18 17:21 Dose:  1.25 mg


Home Med (Home Med)  0 unit OS TID [facility]


   Last Admin: 11/28/18 17:21 Dose:  Not Given


Hydralazine HCl (Apresoline)  10 mg IVP Q6 PRN


   PRN Reason: SBP > 160


Sodium Chloride (Sodium Chloride 0.9%)  1,000 mls @ 50 mls/hr IV .Q20H [facility]


   Last Admin: 11/28/18 05:20 Dose:  50 mls/hr


Ceftriaxone Sodium (Rocephin 2 Gm Ivpb)  2 gm in 100 mls @ 100 mls/hr IVPB DAILY

[facility]; Protocol


   Last Admin: 11/28/18 09:39 Dose:  100 mls/hr


Vancomycin HCl (Vancomycin 1gm)  1 gm in 250 mls @ 167 mls/hr IVPB Q12H [facility]; Pro

tocol


   Last Admin: 11/28/18 12:49 Dose:  167 mls/hr


Levothyroxine Sodium (Synthroid)  125 mcg PO DAILY [facility]


   Last Admin: 11/28/18 09:38 Dose:  Not Given


Lisinopril (Zestril)  20 mg PO DAILY [facility]


   Last Admin: 11/27/18 09:09 Dose:  Not Given


Lorazepam (Ativan)  1 mg IVP Q6H PR[facility]; Protocol


   PRN Reason: Anxiety/Agitation


   Last Admin: 11/28/18 15:28 Dose:  1 mg


Metoprolol Succinate (Toprol Xl)  50 mg PO DAILY [facility]


   Last Admin: 11/27/18 09:07 Dose:  Not Given


Nystatin (Nystop Topical Powder)  0 gm TOP Q8H [facility]


   Last Admin: 11/28/18 11:58 Dose:  1 applic


Pantoprazole Sodium (Protonix Inj)  40 mg IVP DAILY [facility]


   Last Admin: 11/28/18 09:49 Dose:  40 mg


Prednisolone Acetate (Pred Forte 1% Opht Susp)  0 ml OS DAILY [facility]


   Last Admin: 11/28/18 09:31 Dose:  2 drop


Risperidone (Risperdal Oral Soln)  0.5 mg PO HS [facility]; Protocol


   Last Admin: 11/28/18 06:14 Dose:  Not Given


Thiamine HCl (Vitamin B1 Tab)  100 mg PO BID [facility]


   Last Admin: 11/28/18 17:22 Dose:  Not Given


Ziprasidone (Geodon Inj)  10 mg IM Q12 PRN; Protocol


   PRN Reason: severe agitaiton/psychosis


   Last Admin: 11/28/18 11:49 Dose:  10 mg











- Labs


Labs: 


                                        





                                 11/28/18 09:30 





                                 11/28/18 09:30 





                                        











PT  14.2 SECONDS (9.4-12.5)  H  11/27/18  05:15    


 


INR  1.23   11/27/18  05:15    


 


APTT  32.3 Seconds (25.1-36.5)   11/26/18  18:56    














Assessment and Plan





- Assessment and Plan (Free Text)


Plan: 





Pt seen and examined. I have reviewed the note of the medical resident and agree

with it. I have discussed the assessment and plan with the resident.  I have 

reviewed the patient's labs and medications. Pt with Delerium and Dementia-Alzh 

type. The Delerium has improved. AAA is noted. BP will need to be controlled. 

CAD being treated with ASA and Lipitor. He is going to get a swallowing 

evaluation. He is on Duonebs for his COPD.

## 2018-11-28 NOTE — CP.PCM.PN
Subjective





- Date & Time of Evaluation


Date of Evaluation: 11/28/18


Time of Evaluation: 10:00





- Subjective


Subjective: 





pt seen and examined at bedside in .3 ,  in no acute distress


ROS unobtainable,


 


Pt remains incoherent, disoriented, altered and unable to answer question 

sappropraitely, remains in restraints.


 Dr Joshua at bedside to assess. 








Objective





- Vital Signs/Intake and Output


Vital Signs (last 24 hours): 


                                        











Temp Pulse Resp BP Pulse Ox


 


 97.9 F   98 H  25 H  196/106 H  92 L


 


 11/28/18 07:09  11/28/18 06:00  11/28/18 06:00  11/28/18 11:58  11/28/18 06:00








Intake and Output: 


                                        











 11/28/18 11/28/18





 06:59 18:59


 


Intake Total 300 


 


Output Total 0 


 


Balance 300 














- Medications


Medications: 


                               Current Medications





Acetaminophen (Tylenol 325mg Tab)  650 mg PO Q6H PRN


   PRN Reason: Fever >100.4 F


Acetaminophen (Tylenol 325 Mg Supp)  325 mg RC Q6H PRN


   PRN Reason: Fever >100.4 F


Albuterol/Ipratropium (Duoneb 3 Mg/0.5 Mg (3 Ml) Ud)  3 ml IH Q2H PRN


   PRN Reason: Shortness of Breath


Albuterol/Ipratropium (Duoneb 3 Mg/0.5 Mg (3 Ml) Ud)  3 ml IH [id number] [facility]


   Last Admin: 11/28/18 07:28 Dose:  3 ml


Aspirin (Ecotrin)  81 mg PO DAILY [facility]


   Last Admin: 11/28/18 09:38 Dose:  Not Given


Atorvastatin Calcium (Lipitor)  20 mg PO DAILY [facility]


   Last Admin: 11/28/18 09:38 Dose:  Not Given


Brimonidine Tartrate (Alphagan P 0.15% Opht)  1 drop OS Q8H [facility]


   Last Admin: 11/28/18 05:30 Dose:  1 applic


Cholecalciferol (Vitamin D)  2,000 intlu PO DAILY [facility]


   Last Admin: 11/28/18 09:38 Dose:  Not Given


Clonidine HCl (Catapres Tts1 0.1 Mg/24 Hr)  1 patch TD Q7D@1000 [facility]


   Last Admin: 11/28/18 09:46 Dose:  1 patch


Enalaprilat (Vasotec Iv)  1.25 mg IVP Q6 [facility]


   Last Admin: 11/28/18 11:58 Dose:  1.25 mg


Home Med (Home Med)  0 unit OS TID [facility]


   Last Admin: 11/28/18 09:28 Dose:  1 unit


Sodium Chloride (Sodium Chloride 0.9%)  1,000 mls @ 50 mls/hr IV .Q20H [facility]


   Last Admin: 11/28/18 05:20 Dose:  50 mls/hr


Ceftriaxone Sodium (Rocephin 2 Gm Ivpb)  2 gm in 100 mls @ 100 mls/hr IVPB DAILY

[facility]; Protocol


   Last Admin: 11/28/18 09:39 Dose:  100 mls/hr


Vancomycin HCl (Vancomycin 1gm)  1 gm in 250 mls @ 167 mls/hr IVPB Q12H [facility]; 

Protocol


   Last Admin: 11/28/18 02:25 Dose:  167 mls/hr


Levothyroxine Sodium (Synthroid)  125 mcg PO DAILY [facility]


   Last Admin: 11/28/18 09:38 Dose:  Not Given


Lisinopril (Zestril)  20 mg PO DAILY [facility]


   Last Admin: 11/27/18 09:09 Dose:  Not Given


Lorazepam (Ativan)  0.5 mg IVP Q6H PRN; Protocol


   PRN Reason: Anxiety/Agitation


   Last Admin: 11/28/18 09:56 Dose:  0.5 mg


Metoprolol Succinate (Toprol Xl)  50 mg PO DAILY [facility]


   Last Admin: 11/27/18 09:07 Dose:  Not Given


Nystatin (Nystop Topical Powder)  0 gm TOP Q8H [facility]


   Last Admin: 11/28/18 11:58 Dose:  1 applic


Pantoprazole Sodium (Protonix Inj)  40 mg IVP DAILY [facility]


   Last Admin: 11/28/18 09:49 Dose:  40 mg


Prednisolone Acetate (Pred Forte 1% Opht Susp)  0 ml OS DAILY [facility]


   Last Admin: 11/28/18 09:31 Dose:  2 drop


Risperidone (Risperdal Oral Soln)  0.5 mg PO HS [facility]; Protocol


   Last Admin: 11/28/18 06:14 Dose:  Not Given


Ziprasidone (Geodon Inj)  10 mg IM Q12 PRN; Protocol


   PRN Reason: severe agitaiton/psychosis


   Last Admin: 11/28/18 11:49 Dose:  10 mg











- Labs


Labs: 


                                        





                                 11/28/18 09:30 





                                 11/28/18 09:30 





                                        











PT  14.2 SECONDS (9.4-12.5)  H  11/27/18  05:15    


 


INR  1.23   11/27/18  05:15    


 


APTT  32.3 Seconds (25.1-36.5)   11/26/18  18:56    














- Constitutional


Appears: No Acute Distress, Confused, Chronically Ill





- Head Exam


Head Exam: ATRAUMATIC, NORMAL INSPECTION





- Eye Exam


Eye Exam: EOMI, Normal appearance


Additional comments: 





right eye closed





- ENT Exam


ENT Exam: Mucous Membranes Moist





- Neurological Exam


Additional comments: 





pt unable to follow any commands 





Assessment and Plan





- Assessment and Plan (Free Text)


Assessment: 





82 yr male with pmh sig for htn, copd, HLD, Exsmoker, CAD with stents, bilateral

subderal hematomas secondary to trauma from fall ( 4/2018) admitted to ICU for 

further mgmt of AMS and worsening agitation now stabliized and ready for 

transfer to med surg floor  with neuro , ID and psychiatric evaluation ongoing 





ct head negtive , 


chest xray showed right base consolidation suggestive of pneumonia and ID p

atient continues on IV antibiotics of rocephin and vanco 


BC no growth  


UC  no growth 





pt started on Geodon q12 and risperdal per Dr Joshua recommendations 





pt for MRI per neuro recommendation s as well as swallow eval. 





SW note reviewed for follow up withfamily 








Will continue to monitor and follow clinical course, will follow up on MRI 

findings with neuro. 





Devika Ba DNP, APN

## 2018-11-28 NOTE — PN
DATE:  11/28/2018



SUBJECTIVE:  The patient was followed up today.  The patient presented to

be alert.  No option to have meaningful conversation because the patient is

confused also the patient has difficulty to hear, almost deaf.  The patient

also has visual impairment and episodes of restlessness.  This writer tried

her best to talk, loud.  The patient, answered for only a few questions

that he does not know where he is and when this writer educated the patient

that he is in the hospital.  The patient was keep repeating who is in the

hospital.  Besides that, the patient has periods of restlessness and trying

to climb off the bed, but there is no physical aggression.  The patient

also observed to be responding to internal stimuli, was staring on like on

the ear and switching his glans very fast like he has some hallucinations.



OBJECTIVE:

VITAL SIGNS:  Reviewed and seems to be stable.  Temperature 97.9, pulse is

98, blood pressure 170/80, respiration 25, oxygen saturation is 92.



MEDICATIONS:  Reviewed.  The patient is on DuoNeb, aspirin, Lipitor,

brimonidine tartrate eye drops, we will also send vitamin D, Catapres,

Vasotec, Synthroid, Zestril, Ativan 0.5 mg IV push every 6 hours as needed,

Toprol, Protonix, prednisone, Risperdal 0.5 mg at the nighttime, sodium

chloride, vancomycin and this writer well add Geodon because the patient is

on nothing by mouth and the patient has periods of restlessness.



LABORATORY DATA:  Reviewed.  The patient still has leukocytosis.  WBC cells

16.3, hemoglobin and hematocrit 13.5 and 39.6.  Coagulation reviewed; blood

gas reviewed.  Chemistry reviewed.  Prolactin is less than 0.05.  Urinalysis

blood moderate.  Microbiology reviewed.



MENTAL STATUS EXAMINATION:  The patient presented to be alert, somewhat

confused.  The patient is very hard to interview because the patient has

visual impairment, as well as difficulty to hear, but the patient presented

to be calmer, but earlier the patient was restless.  The patient was not

able to answer how is his mood today.  The patient does not know where he is. 

Thought process seems to be confused.  Thought content, the patient

observed responding to internal stimuli.  Insight and judgment seems to be

limited.  Impulses are unpredictable.



IMPRESSION:  Most likely, the patient has multifactorial delirium.  The

patient has emphysema.  The patient has aortic aneurysm.  Also, the patient

has pneumonia.  Currently the patient is on antibiotics.



PLAN:  The patient will be benefit from small dose of benzodiazepines, IV

push, we will continue that.  The patient is on Risperdal, but the patient

was not able to take anything by mouth.  We will order Geodon 10 mg twice a

day and alternation between benzodiazepines as well as antipsychotic

medication might be helpful for the patient.  Family should be involved as much 
as possible

because patient has visual impairment, as well as having

hearing problem and confused.  Meanwhile, continue current management.  Continue

current medication.  Should you have any questions give me a call back.  We

will follow up and advise accordingly.







__________________________________________

Josiane Oneill MD





DD:  11/28/2018 10:42:33

DT:  11/28/2018 10:45:59

Job # [id number]



MTDD

## 2018-11-28 NOTE — CON
DATE:  11/28/2018



NEUROLOGY CONSULTATION



CHIEF COMPLAINT:  Altered mental status.



HISTORY OF PRESENT ILLNESS:  This is an 82-year-old man with a history of

hypertension, COPD, hyperlipidemia, ex-smoker, coronary artery disease with

stents, bilateral subdural hematoma secondary to trauma and fall in

04/2018; now presents to the ER with family members for worsening agitation

and confusion, and was combative and agitated, and is in a state of

delirium, had leukocytosis of 16.2 with fevers with the chest x-ray showing

no any active disease, but a CAT scan of the abdomen revealed severe

emphysema and infra-abdominal aortic aneurysm of 5.4 cm and consolidation

at the right base representing pneumonia, on IV ceftriaxone and IV vanco by

ID.  I was consulted to evaluate for mental status change.  The patient has

cognitive impairment and follows simple commands and is more articulate,

but is lethargic intermittently.  CAT scan of the head showed no acute

intracranial abnormalities.  MRI of the brain has been ordered as well as

an ammonia level.



PAST MEDICAL HISTORY:  As above.



ALLERGIES:  NO KNOWN DRUG ALLERGIES.



REVIEW OF SYSTEMS:  Not obtained due to the patient's altered mental

status.



MEDICATIONS:  Reviewed by nurses' reconciliation sheet.



FAMILY HISTORY:  Noncontributory.



SOCIAL HISTORY:  No illicit drug use, smoking, or EtOH abuse at this time;

ex-smoker.



LABORATORY DATA:  Sodium is 145, potassium 3.7, chloride 112, carbon

dioxide 24, BUN of 19, creatinine of 0.9.  Random glucose 108, ammonia

level was 9.



PHYSICAL EXAMINATION:

GENERAL:  The patient is seen on bed, lethargic, in no acute distress.

VITAL SIGNS:  Temperature 97.9, pulse rate of 92, blood pressure 179/109,

respiratory rate of 27, oxygen saturation of 95% on room air.

HEENT:  Atraumatic, normocephalic.  PERRLA.  Extraocular muscles intact.

NECK:  Supple.  No JVD.  No adenopathy noted.

LUNGS:  Decreased breath sounds bilaterally.

ABDOMEN:  Soft, nontender, nondistended. Bowel sounds present.

EXTREMITIES:  No clubbing.  No cyanosis.  Peripheral pulses 2+ felt

bilaterally.

NEUROLOGICAL:  The patient is confused, follows simple commands, more alert

but is agitating.  Hard of hearing at baseline.  Speech is fluent without

any errors.  Cranial nerves II through XII are intact except for bilateral

hard of hearing, partially decreased vision in both eyes, which is chronic

and cataract eye surgery.  Speech is hypophonic.  No aphasia noted.  Motor

Exam:  Moves all extremities equally.  No pronator drift is seen. 

Withdraws to localized and noxious stimulus.  Sensory Exam:  Light touch is

intact.  Proprioception is intact.  DTRs are 1+ throughout.  Coordination: 

Gait deferred for now.



IMPRESSION:  This is an 82-year-old man with past medical history of

hypertension, chronic obstructive pulmonary disease, hyperlipidemia,

ex-smoker, coronary artery disease with stents, bilateral subdural hematoma

secondary to trauma and fall from 08/04/2018; presented to the ER with

confusion, worsening agitation and found to have elevated leukocytosis and

right lung base presenting pneumonia in terms of consolidation, on IV

ceftriaxone and IV vanco.  I was called to evaluate for mental status

change.  It seems that the patient has delirium superimposed underlying

severe cognitive impairment with behavioral disturbance.  There is evidence

of likely toxic metabolic encephalopathy component to this as well.  Also,

his mental status change could be also affected to hypertensive urgency

since his elevated systolic and diastolic blood pressures.



RECOMMENDATIONS:  At this time, I recommend;

1.  Keep systolic blood pressure in 130s to 140s and diastolic 70s to 80s.

2.  MRI of the brain without contrast to assess for any structural

abnormalities causing change in mental status.

3.  Aspirin 81 and Lipitor 20 for stroke prevention.

4.  Consider thiamine 100 mg p.o. b.i.d.

5.  Continue with risperidone for agitation 0.5 mg p.o. at bed time as per

Psych and follow psychiatric recommendations.

6.  Delirium precautions.

7.  Monitor electrolytes and correct accordingly, and continue current

present medical management.







__________________________________________

Chris Calvillo MD





DD:  11/28/2018 12:06:11

DT:  11/28/2018 13:37:15

Job # [id number]

## 2018-11-29 LAB
ALBUMIN SERPL-MCNC: 4 G/DL (ref 3–4.8)
ALBUMIN/GLOB SERPL: 1.2 {RATIO} (ref 1.1–1.8)
ALT SERPL-CCNC: 35 U/L (ref 7–56)
AST SERPL-CCNC: 58 U/L (ref 17–59)
BASOPHILS # BLD AUTO: 0.02 K/MM3 (ref 0–2)
BASOPHILS NFR BLD: 0.2 % (ref 0–3)
BUN SERPL-MCNC: 18 MG/DL (ref 7–21)
CALCIUM SERPL-MCNC: 9.1 MG/DL (ref 8.4–10.5)
EOSINOPHIL # BLD: 0.1 10*3/UL (ref 0–0.7)
EOSINOPHIL NFR BLD: 0.8 % (ref 1.5–5)
ERYTHROCYTE [DISTWIDTH] IN BLOOD BY AUTOMATED COUNT: 14.2 % (ref 11.5–14.5)
GFR NON-AFRICAN AMERICAN: > 60
GRANULOCYTES # BLD: 10.37 10*3/UL (ref 1.4–6.5)
GRANULOCYTES NFR BLD: 79.9 % (ref 50–68)
HGB BLD-MCNC: 13.7 G/DL (ref 14–18)
LYMPHOCYTES # BLD: 1.2 10*3/UL (ref 1.2–3.4)
LYMPHOCYTES NFR BLD AUTO: 9.5 % (ref 22–35)
MCH RBC QN AUTO: 30.4 PG (ref 25–35)
MCHC RBC AUTO-ENTMCNC: 32.5 G/DL (ref 31–37)
MCV RBC AUTO: 93.6 FL (ref 80–105)
MONOCYTES # BLD AUTO: 1.3 10*3/UL (ref 0.1–0.6)
MONOCYTES NFR BLD: 9.6 % (ref 1–6)
PLATELET # BLD: 270 10^3/UL (ref 120–450)
PMV BLD AUTO: 9.9 FL (ref 7–11)
RBC # BLD AUTO: 4.51 10^6/UL (ref 3.5–6.1)
WBC # BLD AUTO: 13 10^3/UL (ref 4.5–11)

## 2018-11-29 RX ADMIN — BRIMONIDINE TARTRATE SCH DROP: 1.5 SOLUTION/ DROPS OPHTHALMIC at 05:55

## 2018-11-29 RX ADMIN — IPRATROPIUM BROMIDE AND ALBUTEROL SULFATE SCH ML: .5; 3 SOLUTION RESPIRATORY (INHALATION) at 19:24

## 2018-11-29 RX ADMIN — PREDNISOLONE ACETATE SCH DROP: 10 SUSPENSION/ DROPS OPHTHALMIC at 10:34

## 2018-11-29 RX ADMIN — PIPERACILLIN AND TAZOBACTAM SCH MLS/HR: 3; .375 INJECTION, POWDER, LYOPHILIZED, FOR SOLUTION INTRAVENOUS; PARENTERAL at 18:27

## 2018-11-29 RX ADMIN — PIPERACILLIN AND TAZOBACTAM SCH MLS/HR: 3; .375 INJECTION, POWDER, LYOPHILIZED, FOR SOLUTION INTRAVENOUS; PARENTERAL at 23:38

## 2018-11-29 RX ADMIN — VITAMIN D, TAB 1000IU (100/BT) SCH: 25 TAB at 10:37

## 2018-11-29 RX ADMIN — IPRATROPIUM BROMIDE AND ALBUTEROL SULFATE PRN ML: .5; 3 SOLUTION RESPIRATORY (INHALATION) at 00:42

## 2018-11-29 RX ADMIN — VANCOMYCIN HYDROCHLORIDE SCH MLS/HR: 1 INJECTION, POWDER, LYOPHILIZED, FOR SOLUTION INTRAVENOUS at 14:45

## 2018-11-29 RX ADMIN — IPRATROPIUM BROMIDE AND ALBUTEROL SULFATE SCH ML: .5; 3 SOLUTION RESPIRATORY (INHALATION) at 07:14

## 2018-11-29 RX ADMIN — BRIMONIDINE TARTRATE SCH DROP: 1.5 SOLUTION/ DROPS OPHTHALMIC at 18:20

## 2018-11-29 RX ADMIN — IPRATROPIUM BROMIDE AND ALBUTEROL SULFATE SCH ML: .5; 3 SOLUTION RESPIRATORY (INHALATION) at 13:16

## 2018-11-29 RX ADMIN — NYSTATIN SCH APPLIC: 100000 POWDER TOPICAL at 14:42

## 2018-11-29 RX ADMIN — IPRATROPIUM BROMIDE AND ALBUTEROL SULFATE SCH ML: .5; 3 SOLUTION RESPIRATORY (INHALATION) at 00:44

## 2018-11-29 RX ADMIN — ENALAPRILAT SCH MG: 1.25 INJECTION, SOLUTION INTRAVENOUS at 23:37

## 2018-11-29 RX ADMIN — IPRATROPIUM BROMIDE AND ALBUTEROL SULFATE PRN ML: .5; 3 SOLUTION RESPIRATORY (INHALATION) at 22:33

## 2018-11-29 RX ADMIN — CEFTRIAXONE SCH MLS/HR: 2 INJECTION, POWDER, FOR SOLUTION INTRAMUSCULAR; INTRAVENOUS at 10:58

## 2018-11-29 RX ADMIN — LEVOTHYROXINE SODIUM ANHYDROUS SCH: 100 INJECTION, POWDER, LYOPHILIZED, FOR SOLUTION INTRAVENOUS at 14:42

## 2018-11-29 RX ADMIN — ENALAPRILAT SCH MG: 1.25 INJECTION, SOLUTION INTRAVENOUS at 14:26

## 2018-11-29 RX ADMIN — ENALAPRILAT SCH MG: 1.25 INJECTION, SOLUTION INTRAVENOUS at 10:37

## 2018-11-29 RX ADMIN — NYSTATIN SCH APPLIC: 100000 POWDER TOPICAL at 06:13

## 2018-11-29 RX ADMIN — IPRATROPIUM BROMIDE AND ALBUTEROL SULFATE SCH: .5; 3 SOLUTION RESPIRATORY (INHALATION) at 01:08

## 2018-11-29 RX ADMIN — ENALAPRILAT SCH MG: 1.25 INJECTION, SOLUTION INTRAVENOUS at 14:44

## 2018-11-29 NOTE — CP.PCM.PN
<Fredo Lucero - Last Filed: 11/29/18 21:42>





Subjective





- Date & Time of Evaluation


Date of Evaluation: 11/29/18


Time of Evaluation: 07:40





- Subjective


Subjective: 





Progress Note for Dr. Tolentino Service





Patient seen and examined at bedside.  As per nursing, remains confused, 

agitated, kicking up legs and trying to slide out of bed.  Still with illogical 

speech, appears unchanged from yesterday on this front.  Not following any 

commands.





Objective





- Vital Signs/Intake and Output


Vital Signs (last 24 hours): 


                                        











Temp Pulse Resp BP Pulse Ox


 


 99.2 F   116 H  22   150/111 H  90 L


 


 11/28/18 22:18  11/28/18 22:18  11/28/18 22:18  11/29/18 05:57  11/28/18 22:18








Intake and Output: 


                                        











 11/29/18 11/29/18





 06:59 18:59


 


Intake Total 15 


 


Balance 15 














- Medications


Medications: 


                               Current Medications





Acetaminophen (Tylenol 325mg Tab)  650 mg PO Q6H PRN


   PRN Reason: Fever >100.4 F


Acetaminophen (Tylenol 325 Mg Supp)  325 mg RC Q6H PRN


   PRN Reason: Fever >100.4 F


Albuterol/Ipratropium (Duoneb 3 Mg/0.5 Mg (3 Ml) Ud)  3 ml IH Q2H PRN


   PRN Reason: Shortness of Breath


   Last Admin: 11/29/18 00:42 Dose:  3 ml


Albuterol/Ipratropium (Duoneb 3 Mg/0.5 Mg (3 Ml) Ud)  3 ml IH [id number] [facility]


   Last Admin: 11/29/18 07:14 Dose:  3 ml


Amlodipine Besylate (Norvasc)  10 mg PO DAILY [facility]


   Last Admin: 11/28/18 17:21 Dose:  Not Given


Aspirin (Ecotrin)  81 mg PO DAILY [facility]


   Last Admin: 11/28/18 09:38 Dose:  Not Given


Atorvastatin Calcium (Lipitor)  40 mg PO DAILY [facility]


Brimonidine Tartrate (Alphagan P 0.15% Opht)  1 drop OS Q8H [facility]


   Last Admin: 11/29/18 05:55 Dose:  1 drop


Cholecalciferol (Vitamin D)  2,000 intlu PO DAILY [facility]


   Last Admin: 11/28/18 09:38 Dose:  Not Given


Clonidine HCl (Catapres-Tts2 0.2 Mg/24 Hr)  1 patch TD Q7D@1000 [facility]


   Last Admin: 11/28/18 17:17 Dose:  1 patch


Diltiazem HCl (Cardizem)  30 mg PO QID [facility]


   Last Admin: 11/29/18 01:00 Dose:  Not Given


Enalaprilat (Vasotec Iv)  1.25 mg IVP Q6 [facility]


   Last Admin: 11/28/18 17:21 Dose:  1.25 mg


Home Med (Home Med)  0 unit OS TID [facility]


   Last Admin: 11/28/18 17:21 Dose:  Not Given


Hydralazine HCl (Apresoline)  10 mg IVP Q6 PRN


   PRN Reason: SBP > 160


   Last Admin: 11/29/18 05:57 Dose:  10 mg


Sodium Chloride (Sodium Chloride 0.9%)  1,000 mls @ 50 mls/hr IV .Q20H [facility]


   Last Admin: 11/29/18 04:41 Dose:  50 mls/hr


Ceftriaxone Sodium (Rocephin 2 Gm Ivpb)  2 gm in 100 mls @ 100 mls/hr IVPB DAILY

[facility]; Protocol


   Last Admin: 11/28/18 09:39 Dose:  100 mls/hr


Vancomycin HCl (Vancomycin 1gm)  1 gm in 250 mls @ 167 mls/hr IVPB Q12H [facility]; 

Protocol


   Last Admin: 11/28/18 12:49 Dose:  167 mls/hr


Levothyroxine Sodium (Synthroid)  125 mcg PO DAILY [facility]


   Last Admin: 11/28/18 09:38 Dose:  Not Given


Levothyroxine Sodium (Synthroid)  60 mcg IVP DAILY [facility]


Lisinopril (Zestril)  20 mg PO DAILY [facility]


   Last Admin: 11/27/18 09:09 Dose:  Not Given


Lorazepam (Ativan)  1 mg IVP Q6H PRN; Protocol


   PRN Reason: Anxiety/Agitation


   Last Admin: 11/29/18 00:19 Dose:  1 mg


Metoprolol Succinate (Toprol Xl)  50 mg PO DAILY [facility]


   Last Admin: 11/27/18 09:07 Dose:  Not Given


Nystatin (Nystop Topical Powder)  0 gm TOP Q8H [facility]


   Last Admin: 11/29/18 06:13 Dose:  1 applic


Pantoprazole Sodium (Protonix Inj)  40 mg IVP DAILY [facility]


   Last Admin: 11/28/18 09:49 Dose:  40 mg


Prednisolone Acetate (Pred Forte 1% Opht Susp)  0 ml OS DAILY [facility]


   Last Admin: 11/28/18 09:31 Dose:  2 drop


Risperidone (Risperdal Oral Soln)  0.5 mg PO HS [facility]; Protocol


   Last Admin: 11/29/18 06:09 Dose:  Not Given


Thiamine HCl (Vitamin B1 Tab)  100 mg PO BID [facility]


   Last Admin: 11/28/18 17:22 Dose:  Not Given


Ziprasidone (Geodon Inj)  10 mg IM Q12 PRN; Protocol


   PRN Reason: severe agitaiton/psychosis


   Last Admin: 11/29/18 05:59 Dose:  10 mg











- Labs


Labs: 


                                        





                                 11/29/18 07:30 





                                 11/29/18 07:30 





                                        











PT  14.2 SECONDS (9.4-12.5)  H  11/27/18  05:15    


 


INR  1.23   11/27/18  05:15    


 


APTT  32.3 Seconds (25.1-36.5)   11/26/18  18:56    














- Additional Findings


Additional findings: 





- Constitutional


Appears: Non-toxic, Confused (incoherent speech, confused, not following 

commands), Chronically Ill





- Head Exam


Head Exam: ATRAUMATIC, NORMAL INSPECTION, NORMOCEPHALIC





- Eye Exam


Eye Exam: Normal appearance (Left eye normal appearance).  absent: Conjunctival 

injection, Scleral icterus


Pupil Exam: absent: Irregular, Unequal


Absent right eye, no discharge or swelling around R eye socket





- ENT Exam


ENT Exam: Mucous Membranes Moist





- Neck Exam


Neck exam: Positive for: Full Rom (passively, not following commands)





- Respiratory Exam


Respiratory Exam: Clear to Auscultation Bilateral, NORMAL BREATHING PATTERN.  

absent: Accessory Muscle Use, Decreased Breath Sounds, Rales, Rhonchi, Wheezes





- Cardiovascular Exam


Cardiovascular Exam: Tachycardia (tachy to 110's on bedside monitor), REGULAR 

RHYTHM, +S1, +S2.  absent: Bradycardia, Irregular Rhythm, JVD, +S4





- GI/Abdominal Exam


GI & Abdominal Exam: Normal Bowel Sounds, Soft.  absent: Diminished Bowel 

Sounds, Distended, Firm, Hyperactive Bowel Sounds, Hypoactive Bowel Sounds, 

Rigid





- Extremities Exam


Extremities exam: Positive for: normal capillary refill, pedal pulses present.  

Negative for: pedal edema





- Neurological Exam


altered, not following commands, attempting to speak but frequently incoherent 

or illogical, intermittently moving extremities spontaneously and trying to 

slide out of bed





- Psychiatric Exam


unable to assess, altered and incoherent, intermittently agitated





- Skin


Skin Exam: Dry, Intact, Normal Color, Warm








Assessment and Plan





- Assessment and Plan (Free Text)


Assessment: 





This is an 83 yo M with PMH of HTN, HLD, COPD, Hx SDH 2/2 fall trauma, CAD s/p 

stenting, Hypothyroidism, Hearing impairment of the right ear, and traumatic 

loss of right eye who [place] [facility] [place] several day history of altered 

mental status/agitation.  He is being worked up for septic vs neurologic cause 

of AMS, and is being evaluated for newly discovered AAA.


Plan: 





1) Progressively worsening AMS/Agitation


-more awake and active, less incoherent but still illogical speech


-Etiology unclear, r/o septic vs acute neurologic etiology


   Likely delirium component to agitation


-Empirically on Vanco/Zosyn as per ID


-procal negative, blood and urine cx negative currently


-As per ID, may require LP


-As per Neuro, likely toxic metabolic encephalopathy, MRI brain (still pending),

thiamine bid, BP control with goals 130's-140's/70's-80's


-As per Psych: ativan and geodon prn, risperidone qHS


-unable to take PO meds due to altered mental status, switching meds to IV 

formulation where feasible


-Aspiration precaution, head of bed to 30 degrees; CXR this AM obtained to rule 

out new aspiration, was negative





2) AAA


-CT abdomen/pelvis concerning for for infrarenal abdominal aortic aneurysm 

measuring 5.4 cm (transverse) and mild aneurysmal dilatation of the distal 

abdominal aorta just above the bifurcation measuring 3.8 cm


-Aggressive BP control, goal is to maintain SBP < 140


-NPO, Pressure control with Vasotec q6, hydralazine PRN, clonidine 0.2mg patch, 

prn Diltiazem IVP (1x today)


-IR consulted for possibility of endovascular repair, appreciate their insights





3) Chronic issues:


-COPD: continue Duonebs, no wheezing so no need for steroids


-CAD s/p stenting: continue ASA, Lipitor


-Cataract surgery: continue eyedrops


-Hypothyroid: continue synthroid





Dispo: BP control, pending MRI, pending IR recs


Ppx: Protonix for GI, SCDs for DVT 





Reviewed and discussed with attending, Dr Tolentino








<Sebastian Tolentino S - Last Filed: 12/01/18 18:05>





Objective





- Vital Signs/Intake and Output


Vital Signs (last 24 hours): 


                                        











Temp Pulse Resp BP Pulse Ox


 


 100 F H  96 H  30 H  120/82   97 


 


 12/01/18 14:35  12/01/18 17:22  12/01/18 14:35  12/01/18 17:22  12/01/18 14:35








Intake and Output: 


                                        











 12/01/18 12/01/18





 06:59 18:59


 


Intake Total 0 


 


Balance 0 














- Medications


Medications: 


                               Current Medications





Acetaminophen (Tylenol 325mg Tab)  650 mg PO Q6H PRN


   PRN Reason: Fever >100.4 F


Acetaminophen (Tylenol 325 Mg Supp)  325 mg RC Q6H PRN


   PRN Reason: Fever >100.4 F


Albuterol/Ipratropium (Duoneb 3 Mg/0.5 Mg (3 Ml) Ud)  3 ml IH Q2H PRN


   PRN Reason: Shortness of Breath


   Last Admin: 11/29/18 22:33 Dose:  3 ml


Albuterol/Ipratropium (Duoneb 3 Mg/0.5 Mg (3 Ml) Ud)  3 ml IH [id number] [facility]


   Last Admin: 12/01/18 13:07 Dose:  3 ml


Amlodipine Besylate (Norvasc)  10 mg PO DAILY [facility]


   Last Admin: 12/01/18 10:29 Dose:  10 mg


Aspirin (Ecotrin)  81 mg PO DAILY [facility]


   Last Admin: 12/01/18 10:32 Dose:  81 mg


Atorvastatin Calcium (Lipitor)  40 mg PO DAILY [facility]


   Last Admin: 12/01/18 10:30 Dose:  40 mg


Brimonidine Tartrate (Alphagan P 0.15% Opht)  1 drop OS Q8H [facility]


   Last Admin: 12/01/18 13:17 Dose:  1 drop


Cholecalciferol (Vitamin D)  2,000 intlu PO DAILY [facility]


   Last Admin: 12/01/18 10:40 Dose:  2,000 intlu


Clonidine HCl (Catapres-Tts2 0.2 Mg/24 Hr)  1 patch TD Q7D@1000 [facility]


   Last Admin: 11/28/18 17:17 Dose:  1 patch


Diltiazem HCl (Cardizem)  30 mg PO QID [facility]


   Last Admin: 12/01/18 17:22 Dose:  Not Given


Enalaprilat (Vasotec Iv)  1.25 mg IVP Q6 [facility]


   Last Admin: 12/01/18 17:21 Dose:  1.25 mg


Home Med (Home Med)  0 unit OS TID [facility]


   Last Admin: 12/01/18 17:24 Dose:  1 unit


Hydralazine HCl (Apresoline)  10 mg IVP Q6 PRN


   PRN Reason: SBP > 160


   Last Admin: 12/01/18 10:26 Dose:  10 mg


Sodium Chloride (Sodium Chloride 0.9%)  1,000 mls @ 50 mls/hr IV .Q20H [facility]


   Last Admin: 12/01/18 13:15 Dose:  50 mls/hr


Vancomycin HCl (Vancomycin 1gm)  1 gm in 250 mls @ 167 mls/hr IVPB Q12H [facility]; 

Protocol


   Last Admin: 12/01/18 13:12 Dose:  167 mls/hr


Piperacillin Sod/Tazobactam Sod (Zosyn 3.375 In Ns 100ml)  100 mls @ 25 mls/hr 

IVPB Q8H [facility]; Protocol


   Last Admin: 12/01/18 16:58 Dose:  25 mls/hr


Levothyroxine Sodium (Synthroid)  125 mcg PO DAILY [facility]


   Last Admin: 11/28/18 09:38 Dose:  Not Given


Levothyroxine Sodium (Synthroid)  60 mcg IVP DAILY [facility]


   Last Admin: 12/01/18 10:33 Dose:  60 mcg


Lisinopril (Zestril)  20 mg PO DAILY [facility]


   Last Admin: 11/27/18 09:09 Dose:  Not Given


Lorazepam (Ativan)  0.5 mg IVP Q6H [facility]; Protocol


   PRN Reason: Anxiety/Agitation


   Last Admin: 12/01/18 12:51 Dose:  0.5 mg


Metoprolol Succinate (Toprol Xl)  50 mg PO DAILY [facility]


   Last Admin: 11/27/18 09:07 Dose:  Not Given


Nystatin (Nystop Topical Powder)  0 gm TOP Q8H [facility]


   Last Admin: 12/01/18 13:18 Dose:  1 applic


Pantoprazole Sodium (Protonix Inj)  40 mg IVP DAILY [facility]


   Last Admin: 12/01/18 10:41 Dose:  40 mg


Prednisolone Acetate (Pred Forte 1% Opht Susp)  0 ml OS DAILY [facility]


   Last Admin: 12/01/18 10:41 Dose:  1 drop


Risperidone (Risperdal Oral Soln)  0.5 mg PO HS [facility]; Protocol


   Last Admin: 12/01/18 01:37 Dose:  Not Given


Thiamine HCl (Vitamin B1 Tab)  100 mg PO BID [facility]


   Last Admin: 12/01/18 10:40 Dose:  100 mg


Ziprasidone (Geodon Inj)  10 mg IM Q12 PRN; Protocol


   PRN Reason: severe agitaiton/psychosis


   Last Admin: 11/30/18 21:54 Dose:  10 mg











- Labs


Labs: 


                                        





                                 12/01/18 09:20 





                                 12/01/18 09:20 





                                        











PT  14.2 SECONDS (9.4-12.5)  H  11/27/18  05:15    


 


INR  1.23   11/27/18  05:15    


 


APTT  32.3 Seconds (25.1-36.5)   11/26/18  18:56    














Assessment and Plan





- Assessment and Plan (Free Text)


Plan: 





Pt seen and examined.  This is a late entry. I have reviewed the note of the 

medical resident and agree with it. I have reviewed the meds and labs of the pt.

I have discussed the assessment and plan with the resident. Pt with Delerium 

that is better. AAA will be evaluated by IR. COPD treated with Duonebs. He will 

continue with ASA for CAD. He will be on Synthroid for hypothyroid. Pt had 

sepsis that is improved. He is being followed by ID and Neuro.

## 2018-11-29 NOTE — CP.PCM.PN
Subjective





- Date & Time of Evaluation


Date of Evaluation: 11/29/18


Time of Evaluation: 14:15





- Subjective


Subjective: 


Infectious Disease Follow Up:


November 29, 2018





83 yo male presented to [facility] with several days of AMS and worsening agitation.  

Unable to obtain further information from the patient as he is currently sedated

with Ativan.  History and events obtained from family, staff, and hospital 

chart.  The PMHx includes HTN, COPD, hx of small bilateral subdural hematoma 

secondary to trauma from fall without neurosurgical intervention(4/2018), 

hypothyroidism, HLD, CAD s/p cardiac stent 2003.  As per wife, the patient was 

having odd behavior 6 days prior to admission with disorganized and incoherent 

speech.  The patient has become more agitated and restless each day.  He was 

found to be tachycardic with low grade fevers up to 100.5 F.





He remains confused and is currently in soft restraints.  The patient's wife is 

in the room.  No other sick contacts.  [facility].  He received steroid eye drops and ophthalmic antibiotics 

since that time.  As per the wife, the patient has been able to recognize her 

once in while during her visit.  He is otherwise confused and restless.





No improvement mentally from yesterday.  WBC increased yesterday but improved 

today.





On Rocephin and Vancomycin IV.








Objective





- Vital Signs/Intake and Output


Vital Signs (last 24 hours): 


                                        











Temp Pulse Resp BP Pulse Ox


 


 98.1 F   134 H  22   197/112 H  91 L


 


 11/29/18 14:00  11/29/18 14:00  11/29/18 14:00  11/29/18 14:00  11/29/18 14:00








Intake and Output: 


                                        











 11/29/18 11/29/18





 06:59 18:59


 


Intake Total 15 


 


Balance 15 














- Medications


Medications: 


                               Current Medications





Acetaminophen (Tylenol 325mg Tab)  650 mg PO Q6H PRN


   PRN Reason: Fever >100.4 F


Acetaminophen (Tylenol 325 Mg Supp)  325 mg RC Q6H PRN


   PRN Reason: Fever >100.4 F


Albuterol/Ipratropium (Duoneb 3 Mg/0.5 Mg (3 Ml) Ud)  3 ml IH Q2H PRN


   PRN Reason: Shortness of Breath


   Last Admin: 11/29/18 00:42 Dose:  3 ml


Albuterol/Ipratropium (Duoneb 3 Mg/0.5 Mg (3 Ml) Ud)  3 ml IH [id number] [facility]


   Last Admin: 11/29/18 13:16 Dose:  3 ml


Amlodipine Besylate (Norvasc)  10 mg PO DAILY [facility]


   Last Admin: 11/29/18 10:34 Dose:  Not Given


Aspirin (Ecotrin)  81 mg PO DAILY [facility]


   Last Admin: 11/29/18 10:32 Dose:  Not Given


Atorvastatin Calcium (Lipitor)  40 mg PO DAILY [facility]


   Last Admin: 11/29/18 10:34 Dose:  Not Given


Brimonidine Tartrate (Alphagan P 0.15% Opht)  1 drop OS Q8H [facility]


   Last Admin: 11/29/18 05:55 Dose:  1 drop


Cholecalciferol (Vitamin D)  2,000 intlu PO DAILY [facility]


   Last Admin: 11/29/18 10:37 Dose:  Not Given


Clonidine HCl (Catapres-Tts2 0.2 Mg/24 Hr)  1 patch TD Q7D@1000 [facility]


   Last Admin: 11/28/18 17:17 Dose:  1 patch


Diltiazem HCl (Cardizem)  30 mg PO QID [facility]


   Last Admin: 11/29/18 14:41 Dose:  Not Given


Enalaprilat (Vasotec Iv)  1.25 mg IVP Q6 [facility]


   Last Admin: 11/29/18 14:44 Dose:  1.25 mg


Home Med (Home Med)  0 unit OS TID [facility]


   Last Admin: 11/29/18 14:28 Dose:  Not Given


Hydralazine HCl (Apresoline)  10 mg IVP Q6 PRN


   PRN Reason: SBP > 160


   Last Admin: 11/29/18 05:57 Dose:  10 mg


Sodium Chloride (Sodium Chloride 0.9%)  1,000 mls @ 50 mls/hr IV .Q20H [facility]


   Last Admin: 11/29/18 04:41 Dose:  50 mls/hr


Ceftriaxone Sodium (Rocephin 2 Gm Ivpb)  2 gm in 100 mls @ 100 mls/hr IVPB DAILY

[facility]; Protocol


   Last Admin: 11/29/18 10:58 Dose:  100 mls/hr


Vancomycin HCl (Vancomycin 1gm)  1 gm in 250 mls @ 167 mls/hr IVPB Q12H [facility]; 

Protocol


   Last Admin: 11/29/18 14:45 Dose:  167 mls/hr


Levothyroxine Sodium (Synthroid)  125 mcg PO DAILY [facility]


   Last Admin: 11/28/18 09:38 Dose:  Not Given


Levothyroxine Sodium (Synthroid)  60 mcg IVP DAILY [facility]


   Last Admin: 11/29/18 14:42 Dose:  Not Given


Lisinopril (Zestril)  20 mg PO DAILY [facility]


   Last Admin: 11/27/18 09:09 Dose:  Not Given


Lorazepam (Ativan)  1 mg IVP Q6H [facility]; Protocol


   PRN Reason: Anxiety/Agitation


   Last Admin: 11/29/18 00:19 Dose:  1 mg


Metoprolol Succinate (Toprol Xl)  50 mg PO DAILY [facility]


   Last Admin: 11/27/18 09:07 Dose:  Not Given


Nystatin (Nystop Topical Powder)  0 gm TOP Q8H [facility]


   Last Admin: 11/29/18 14:42 Dose:  1 applic


Pantoprazole Sodium (Protonix Inj)  40 mg IVP DAILY [facility]


   Last Admin: 11/29/18 10:35 Dose:  40 mg


Prednisolone Acetate (Pred Forte 1% Opht Susp)  0 ml OS DAILY [facility]


   Last Admin: 11/29/18 10:34 Dose:  1 drop


Risperidone (Risperdal Oral Soln)  0.5 mg PO HS [facility]; Protocol


   Last Admin: 11/29/18 06:09 Dose:  Not Given


Thiamine HCl (Vitamin B1 Tab)  100 mg PO BID [facility]


   Last Admin: 11/29/18 10:37 Dose:  Not Given


Ziprasidone (Geodon Inj)  10 mg IM Q12 PRN; Protocol


   PRN Reason: severe agitaiton/psychosis


   Last Admin: 11/29/18 14:26 Dose:  10 mg











- Labs


Labs: 


                                        





                                 11/29/18 07:30 





                                 11/29/18 07:30 





                                        











PT  14.2 SECONDS (9.4-12.5)  H  11/27/18  05:15    


 


INR  1.23   11/27/18  05:15    


 


APTT  32.3 Seconds (25.1-36.5)   11/26/18  18:56    














- Constitutional


Appears: Confused, Chronically Ill





- Head Exam


Head Exam: ATRAUMATIC, NORMOCEPHALIC





- Eye Exam


Eye Exam: Normal appearance (left eye only.)


Pupil Exam: Irregular, Unequal


Additional comments: 





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 

but listed as dead/nonfunctional.





- ENT Exam


ENT Exam: Mucous Membranes Moist, Normal External Ear Exam, TM's Normal 

Bilaterally





- Neck Exam


Neck Exam: Full ROM, Normal Inspection





- Respiratory Exam


Respiratory Exam: Clear to Ausculation Bilateral, NORMAL BREATHING PATTERN.  

absent: Rales, Rhonchi, Wheezes





- Cardiovascular Exam


Cardiovascular Exam: REGULAR RHYTHM, RRR, +S1, +S2





- GI/Abdominal Exam


GI & Abdominal Exam: Soft, Normal Bowel Sounds.  absent: Distended, Tenderness





- Extremities Exam


Extremities Exam: Full ROM, Normal Inspection





- Neurological Exam


Additional comments: 


Not following commands, restless, confused, incoherent usually during this 

hospitalization.  However, the patient has had periods of lucency.








- Psychiatric Exam


Additional comments: 





Confused and agitated when I saw patient.





- Skin


Skin Exam: Dry, Intact, Normal Color





Assessment and Plan





- Assessment and Plan (Free Text)


Assessment: 


83 yo  male brought in for AMS and Confusion.  CT of Abdomen showing 

Infrarenal Aortic Hernia and right lung base consolidation suggestive of a pneu

monia.  On Rocephin and Vancomycin IV.  CT head did not show any bleeding in the

brain.  Procalcitonin sent.  Supportive care.  Pan cultures sent.  Mild 

leukocytosis.





Awaiting ammonia and tox screen.  Procalcitonin <0.05.  This argues against a 

septic or pneumonic process.  Ammonia < 9.





Patient with Hypothyroidism, CAD, COPD, and severe hearing impairment.





An LP may be useful on this patient.





If no improvement in the next 24 hours, will consider change of Rocephin to 

Zosyn for antibiotic coverage.  Will switch Rocephin to Zosyn now.





Thank you for allowing me to participate in the care of the patient, we will 

follow with you.

## 2018-11-29 NOTE — RAD
Date of service: 



11/29/2018



HISTORY:

 hacking cough, assess for aspiration pna 



COMPARISON:

11/26/2018 



FINDINGS:



LUNGS:

No active pulmonary disease.



PLEURA:

No significant pleural effusion identified, no pneumothorax apparent.



CARDIOVASCULAR:

No aortic atherosclerotic calcification present.



Normal cardiac size. No pulmonary vascular congestion. 



OSSEOUS STRUCTURES:

No significant abnormalities.



VISUALIZED UPPER ABDOMEN:

Normal.



OTHER FINDINGS:

The patient is rotated to the right



IMPRESSION:

No active disease.

## 2018-11-30 RX ADMIN — PREDNISOLONE ACETATE SCH: 10 SUSPENSION/ DROPS OPHTHALMIC at 12:17

## 2018-11-30 RX ADMIN — VITAMIN D, TAB 1000IU (100/BT) SCH: 25 TAB at 12:18

## 2018-11-30 RX ADMIN — NYSTATIN SCH APPLIC: 100000 POWDER TOPICAL at 15:10

## 2018-11-30 RX ADMIN — PIPERACILLIN AND TAZOBACTAM SCH MLS/HR: 3; .375 INJECTION, POWDER, LYOPHILIZED, FOR SOLUTION INTRAVENOUS; PARENTERAL at 20:06

## 2018-11-30 RX ADMIN — IPRATROPIUM BROMIDE AND ALBUTEROL SULFATE SCH ML: .5; 3 SOLUTION RESPIRATORY (INHALATION) at 07:18

## 2018-11-30 RX ADMIN — VANCOMYCIN HYDROCHLORIDE SCH MLS/HR: 1 INJECTION, POWDER, LYOPHILIZED, FOR SOLUTION INTRAVENOUS at 14:54

## 2018-11-30 RX ADMIN — VANCOMYCIN HYDROCHLORIDE SCH MLS/HR: 1 INJECTION, POWDER, LYOPHILIZED, FOR SOLUTION INTRAVENOUS at 00:57

## 2018-11-30 RX ADMIN — IPRATROPIUM BROMIDE AND ALBUTEROL SULFATE SCH ML: .5; 3 SOLUTION RESPIRATORY (INHALATION) at 13:02

## 2018-11-30 RX ADMIN — IPRATROPIUM BROMIDE AND ALBUTEROL SULFATE SCH ML: .5; 3 SOLUTION RESPIRATORY (INHALATION) at 19:48

## 2018-11-30 RX ADMIN — ENALAPRILAT SCH: 1.25 INJECTION, SOLUTION INTRAVENOUS at 19:32

## 2018-11-30 RX ADMIN — PIPERACILLIN AND TAZOBACTAM SCH MLS/HR: 3; .375 INJECTION, POWDER, LYOPHILIZED, FOR SOLUTION INTRAVENOUS; PARENTERAL at 09:08

## 2018-11-30 RX ADMIN — LEVOTHYROXINE SODIUM ANHYDROUS SCH MCG: 100 INJECTION, POWDER, LYOPHILIZED, FOR SOLUTION INTRAVENOUS at 10:56

## 2018-11-30 RX ADMIN — ENALAPRILAT SCH MG: 1.25 INJECTION, SOLUTION INTRAVENOUS at 12:02

## 2018-11-30 RX ADMIN — ENALAPRILAT SCH MG: 1.25 INJECTION, SOLUTION INTRAVENOUS at 20:06

## 2018-11-30 RX ADMIN — IPRATROPIUM BROMIDE AND ALBUTEROL SULFATE SCH ML: .5; 3 SOLUTION RESPIRATORY (INHALATION) at 01:15

## 2018-11-30 NOTE — PN
DATE:  11/30/2018



SUBJECTIVE:  In short, the patient is an 82-year-old  male,

multiple medical issues.  The patient was admitted on the medical side for

delirium.  Psych consult was called because the patient has episodes of

agitation and trying to climb off the bed as well as confusion.  This

writer recommended small dose of Ativan, which needs to be alternated with

Geodon in case of agitation because the patient has tendency of pulling IV

lines and also Risperdal was started, but Risperdal was not given to the

patient because of the lethargy as well as difficulties to swallow.  The

patient was followed up today.  The patient presented to be comfortable,

but breathing about 22 a minute.  The patient still appears to be confused.

The patient is hard of hearing as well as visually impaired as well as

confused, which complicates the patient's communication.  Notes reviewed. 

The patient was seen by Infectious Disease.  The patient is on antibiotics.

The patient is on vancomycin as well as Zosyn.



OBJECTIVE:

VITAL SIGNS:  Reviewed.  Temperature 99.9, pulse is 100, blood pressure

144/80, respirations 22, and oxygen saturation is 94.

MENTAL STATUS EXAM:  The patient was sedated, appears to be comfortable,

but breathing excessively.  This writer as per report, the patient has

alternation with agitation and sedation.



MEDICATIONS:  Reviewed.  The patient is on DuoNeb, Norvasc, aspirin,

Lipitor, vitamin D, clonidine, Cardizem, hydralazine, Synthroid,

lisinopril, and Ativan 1 mg IV push every 6 hours as needed for agitation. 

Last time, the patient got medication was on 29th.  The patient is on

Toprol, nystatin, Protonix, Zosyn, Risperdal 0.5 mg at the nighttime

scheduled, but the patient did not get any of Risperdal.  The patient is on

sodium chloride, vitamin D1, vancomycin and Geodon 10 mg every 12 hours as

needed.



LABORATORY DATA:  Labs reviewed today.  Yesterday, WBC was 13.  Chemistry

reviewed.  Urinalysis reviewed.



IMPRESSION:  Most likely, the patient is on hypoactive and hyperactive

delirium, which is multifactorial.



PLAN:  Continue current management.  Continue current medications.  The

small dose of Geodon twice a day in case if the patient is not able to take

medication by mouth and for severe agitation.  Ativan 1 mg every 6 hours as

needed for anxiety and restless behavior, Risperdal if the patient is able

to tolerate medication by mouth 0.5 mg liquid form.  Meanwhile, family

should be involved reorientation, physical therapy, continue current

management over the weekend.  Dr. Amador will see the patient.  Should you

have any questions give me a call back.







__________________________________________

Josiane Oneill MD





DD:  11/30/2018 16:53:34

DT:  11/30/2018 16:56:50

Job # [id number]

MTDD

## 2018-11-30 NOTE — CP.PCM.PN
<Fredo Lucero - Last Filed: 11/30/18 18:13>





Subjective





- Date & Time of Evaluation


Date of Evaluation: 11/30/18


Time of Evaluation: 07:30





- Subjective


Subjective: 





Progress Note for Dr. Tolentino Service





Patient seen and examined at bedside.  Resting this AM, but remains confused, 

agitated, kicking up legs and trying to slide out of bed as per nursing, 

witnessed on repeat exam later.  Overall mental status appears unchanged over 

the last 2 days.  BP and HR better controlled overnight and this AM since the 

last IV diltiazem push yesterday.








Objective





- Vital Signs/Intake and Output


Vital Signs (last 24 hours): 


                                        











Temp Pulse Resp BP Pulse Ox


 


 99.1 F   100 H  28 H  150/98 H  98 


 


 11/30/18 06:00  11/30/18 12:15  11/30/18 06:00  11/30/18 12:16  11/30/18 06:00








Intake and Output: 


                                        











 11/30/18 11/30/18





 06:59 18:59


 


Intake Total 0 


 


Balance 0 














- Medications


Medications: 


                               Current Medications





Acetaminophen (Tylenol 325mg Tab)  650 mg PO Q6H PRN


   PRN Reason: Fever >100.4 F


Acetaminophen (Tylenol 325 Mg Supp)  325 mg RC Q6H PRN


   PRN Reason: Fever >100.4 F


Albuterol/Ipratropium (Duoneb 3 Mg/0.5 Mg (3 Ml) Ud)  3 ml IH Q2H PRN


   PRN Reason: Shortness of Breath


   Last Admin: 11/29/18 22:33 Dose:  3 ml


Albuterol/Ipratropium (Duoneb 3 Mg/0.5 Mg (3 Ml) Ud)  3 ml IH [id number] [facility]


   Last Admin: 11/30/18 07:18 Dose:  3 ml


Amlodipine Besylate (Norvasc)  10 mg PO DAILY [facility]


   Last Admin: 11/30/18 12:16 Dose:  Not Given


Aspirin (Ecotrin)  81 mg PO DAILY [facility]


   Last Admin: 11/30/18 12:16 Dose:  Not Given


Atorvastatin Calcium (Lipitor)  40 mg PO DAILY [facility]


   Last Admin: 11/30/18 12:16 Dose:  Not Given


Brimonidine Tartrate (Alphagan P 0.15% Opht)  1 drop OS Q8H [facility]


   Last Admin: 11/29/18 18:20 Dose:  1 drop


Cholecalciferol (Vitamin D)  2,000 intlu PO DAILY [facility]


   Last Admin: 11/30/18 12:18 Dose:  Not Given


Clonidine HCl (Catapres-Tts2 0.2 Mg/24 Hr)  1 patch TD Q7D@1000 [facility]


   Last Admin: 11/28/18 17:17 Dose:  1 patch


Diltiazem HCl (Cardizem)  30 mg PO QID [facility]


   Last Admin: 11/30/18 12:15 Dose:  Not Given


Enalaprilat (Vasotec Iv)  1.25 mg IVP Q6 [facility]


   Last Admin: 11/30/18 12:02 Dose:  1.25 mg


Home Med (Home Med)  0 unit OS TID [facility]


   Last Admin: 11/30/18 11:19 Dose:  1 unit


Hydralazine HCl (Apresoline)  10 mg IVP Q6 PRN


   PRN Reason: SBP > 160


   Last Admin: 11/29/18 05:57 Dose:  10 mg


Sodium Chloride (Sodium Chloride 0.9%)  1,000 mls @ 50 mls/hr IV .Q20H [facility]


   Last Admin: 11/29/18 04:41 Dose:  50 mls/hr


Vancomycin HCl (Vancomycin 1gm)  1 gm in 250 mls @ 167 mls/hr IVPB Q12H [facility]; 

Protocol


   Last Admin: 11/30/18 00:57 Dose:  167 mls/hr


Piperacillin Sod/Tazobactam Sod (Zosyn 3.375 In Ns 100ml)  100 mls @ 25 mls/hr 

IVPB Q8H [facility]; Protocol


   Last Admin: 11/30/18 09:08 Dose:  25 mls/hr


Levothyroxine Sodium (Synthroid)  125 mcg PO DAILY [facility]


   Last Admin: 11/28/18 09:38 Dose:  Not Given


Levothyroxine Sodium (Synthroid)  60 mcg IVP DAILY [facility]


   Last Admin: 11/30/18 10:56 Dose:  60 mcg


Lisinopril (Zestril)  20 mg PO DAILY [facility]


   Last Admin: 11/27/18 09:09 Dose:  Not Given


Lorazepam (Ativan)  1 mg IVP Q6H PRN; Protocol


   PRN Reason: Anxiety/Agitation


   Last Admin: 11/29/18 20:27 Dose:  1 mg


Metoprolol Succinate (Toprol Xl)  50 mg PO DAILY [facility]


   Last Admin: 11/27/18 09:07 Dose:  Not Given


Nystatin (Nystop Topical Powder)  0 gm TOP Q8H [facility]


   Last Admin: 11/29/18 14:42 Dose:  1 applic


Pantoprazole Sodium (Protonix Inj)  40 mg IVP DAILY [facility]


   Last Admin: 11/30/18 10:42 Dose:  40 mg


Prednisolone Acetate (Pred Forte 1% Opht Susp)  0 ml OS DAILY [facility]


   Last Admin: 11/30/18 12:17 Dose:  Not Given


Risperidone (Risperdal Oral Soln)  0.5 mg PO HS [facility]; Protocol


   Last Admin: 11/29/18 06:09 Dose:  Not Given


Thiamine HCl (Vitamin B1 Tab)  100 mg PO BID [facility]


   Last Admin: 11/30/18 12:17 Dose:  Not Given


Ziprasidone (Geodon Inj)  10 mg IM Q12 PRN; Protocol


   PRN Reason: severe agitaiton/psychosis


   Last Admin: 11/30/18 01:51 Dose:  10 mg











- Labs


Labs: 


                                        





                                 11/29/18 07:30 





                                 11/29/18 07:30 





                                        











PT  14.2 SECONDS (9.4-12.5)  H  11/27/18  05:15    


 


INR  1.23   11/27/18  05:15    


 


APTT  32.3 Seconds (25.1-36.5)   11/26/18  18:56    














- Additional Findings


Additional findings: 





- Constitutional


Appears: Non-toxic, Confused (incoherent speech, confused, not following 

commands), Chronically Ill





- Head Exam


Head Exam: ATRAUMATIC, NORMAL INSPECTION, NORMOCEPHALIC





- Eye Exam


Eye Exam: Normal appearance (Left eye normal appearance).  absent: Conjunctival 

injection, Scleral icterus


Pupil Exam: absent: Irregular, Unequal


Absent right eye, no discharge or swelling around R eye socket





- ENT Exam


ENT Exam: Mucous Membranes Moist





- Neck Exam


Neck exam: Positive for: Full Rom (passively, not following commands)





- Respiratory Exam


Respiratory Exam: Clear to Auscultation Bilateral, NORMAL BREATHING PATTERN.  

absent: Accessory Muscle Use, Decreased Breath Sounds, Rales, Rhonchi, Wheezes





- Cardiovascular Exam


Cardiovascular Exam: Tachycardia (tachy to 100's), REGULAR RHYTHM, +S1, +S2.  

absent: Bradycardia, Irregular Rhythm, JVD, +S4





- GI/Abdominal Exam


GI & Abdominal Exam: Normal Bowel Sounds, Soft.  absent: Diminished Bowel S

ounds, Distended, Firm, Hyperactive Bowel Sounds, Hypoactive Bowel Sounds, Rigid





- Extremities Exam


Extremities exam: Positive for: normal capillary refill, pedal pulses present.  

Negative for: pedal edema





- Neurological Exam


altered, not following commands, attempting to speak but frequently incoherent 

or illogical, intermittently moving extremities spontaneously and trying to 

slide out of bed





- Psychiatric Exam


unable to assess, altered and incoherent, intermittently agitated





- Skin


Skin Exam: Dry, Intact, Normal Color, Warm








Assessment and Plan





- Assessment and Plan (Free Text)


Assessment: 





This is an 81 yo M with PMH of HTN, HLD, COPD, Hx SDH 2/2 fall trauma, CAD s/p 

stenting, Hypothyroidism, Hearing impairment of the right ear, and traumatic 

loss of right eye who presented to [facility] for several day history of altered 

mental status/agitation.  He is being worked up for septic vs neurologic cause 

of AMS, and is being evaluated for newly discovered AAA.


Plan: 





1) Progressively worsening AMS/Agitation


-more awake and active compared to arrival, but no appreciable change in last 2 

days


   As per wife, last episode like this took approx 6-8 weeks to resolve (when pt

had the subdural bleed)


-Etiology unclear, r/o septic vs acute neurologic etiology, Likely delirium 

component present


-Empirically on Vanco/Zosyn as per ID


-procal negative, blood and urine cx negative currently


-As per Neuro, likely toxic metabolic encephalopathy, MRI brain (still pending),

thiamine bid, BP control with goals 130's-140's/70's-80's


-As per Psych: ativan and geodon prn, risperidone qHS


-unable to take PO meds due to altered mental status, switching meds to IV 

formulation where feasible; will consider NGT for feeds and PO med resumption


-Aspiration precaution, head of bed to 30 degrees





2) AAA


-CT abdomen/pelvis concerning for for infrarenal abdominal aortic aneurysm 

measuring 5.4 cm (transverse) and mild aneurysmal dilatation of the distal abdo

cary aorta just above the bifurcation measuring 3.8 cm


-Aggressive BP control, goal is to maintain SBP < 140


-NPO, Pressure control with Vasotec q6, hydralazine PRN, clonidine 0.2mg patch; 

better controlled today, still with intermittent elevations but seems to be 

mostly 2/2 agitation


-IR consulted for possibility of endovascular repair, appreciate their input; 

not a candidate for repair, recs only conservative measures





3) Chronic issues:


-COPD: continue Duonebs, no wheezing so no need for steroids


-CAD s/p stenting: continue ASA, Lipitor


-Cataract surgery: continue eyedrops


-Hypothyroid: continue synthroid





Dispo: BP control, pending MRI; will need consideration for long term placement 

after workup for AMS completed and no acute treatable cause identified


Ppx: Protonix for GI, SCDs for DVT 





Reviewed and discussed with attending, Dr Tolentino





<Sebastian Tolentino S - Last Filed: 12/01/18 17:44>





Objective





- Vital Signs/Intake and Output


Vital Signs (last 24 hours): 


                                        











Temp Pulse Resp BP Pulse Ox


 


 100 F H  96 H  30 H  120/82   97 


 


 12/01/18 14:35  12/01/18 17:22  12/01/18 14:35  12/01/18 17:22  12/01/18 14:35








Intake and Output: 


                                        











 12/01/18 12/01/18





 06:59 18:59


 


Intake Total 0 


 


Balance 0 














- Medications


Medications: 


                               Current Medications





Acetaminophen (Tylenol 325mg Tab)  650 mg PO Q6H PRN


   PRN Reason: Fever >100.4 F


Acetaminophen (Tylenol 325 Mg Supp)  325 mg RC Q6H PRN


   PRN Reason: Fever >100.4 F


Albuterol/Ipratropium (Duoneb 3 Mg/0.5 Mg (3 Ml) Ud)  3 ml IH Q2H PRN


   PRN Reason: Shortness of Breath


   Last Admin: 11/29/18 22:33 Dose:  3 ml


Albuterol/Ipratropium (Duoneb 3 Mg/0.5 Mg (3 Ml) Ud)  3 ml IH [id number] [facility]


   Last Admin: 12/01/18 13:07 Dose:  3 ml


Amlodipine Besylate (Norvasc)  10 mg PO DAILY [facility]


   Last Admin: 12/01/18 10:29 Dose:  10 mg


Aspirin (Ecotrin)  81 mg PO DAILY [facility]


   Last Admin: 12/01/18 10:32 Dose:  81 mg


Atorvastatin Calcium (Lipitor)  40 mg PO DAILY [facility]


   Last Admin: 12/01/18 10:30 Dose:  40 mg


Brimonidine Tartrate (Alphagan P 0.15% Opht)  1 drop OS Q8H [facility]


   Last Admin: 12/01/18 13:17 Dose:  1 drop


Cholecalciferol (Vitamin D)  2,000 intlu PO DAILY [facility]


   Last Admin: 12/01/18 10:40 Dose:  2,000 intlu


Clonidine HCl (Catapres-Tts2 0.2 Mg/24 Hr)  1 patch TD Q7D@1000 [facility]


   Last Admin: 11/28/18 17:17 Dose:  1 patch


Diltiazem HCl (Cardizem)  30 mg PO QID [facility]


   Last Admin: 12/01/18 17:22 Dose:  Not Given


Enalaprilat (Vasotec Iv)  1.25 mg IVP Q6 [facility]


   Last Admin: 12/01/18 17:21 Dose:  1.25 mg


Home Med (Home Med)  0 unit OS TID [facility]


   Last Admin: 12/01/18 17:24 Dose:  1 unit


Hydralazine HCl (Apresoline)  10 mg IVP Q6 PRN


   PRN Reason: SBP > 160


   Last Admin: 12/01/18 10:26 Dose:  10 mg


Sodium Chloride (Sodium Chloride 0.9%)  1,000 mls @ 50 mls/hr IV .Q20H [facility]


   Last Admin: 12/01/18 13:15 Dose:  50 mls/hr


Vancomycin HCl (Vancomycin 1gm)  1 gm in 250 mls @ 167 mls/hr IVPB Q12H [facility]; 

Protocol


   Last Admin: 12/01/18 13:12 Dose:  167 mls/hr


Piperacillin Sod/Tazobactam Sod (Zosyn 3.375 In Ns 100ml)  100 mls @ 25 mls/hr 

IVPB Q8H [facility]; Protocol


   Last Admin: 12/01/18 16:58 Dose:  25 mls/hr


Levothyroxine Sodium (Synthroid)  125 mcg PO DAILY [facility]


   Last Admin: 11/28/18 09:38 Dose:  Not Given


Levothyroxine Sodium (Synthroid)  60 mcg IVP DAILY [facility]


   Last Admin: 12/01/18 10:33 Dose:  60 mcg


Lisinopril (Zestril)  20 mg PO DAILY [facility]


   Last Admin: 11/27/18 09:09 Dose:  Not Given


Lorazepam (Ativan)  0.5 mg IVP Q6H [facility]; Protocol


   PRN Reason: Anxiety/Agitation


   Last Admin: 12/01/18 12:51 Dose:  0.5 mg


Metoprolol Succinate (Toprol Xl)  50 mg PO DAILY [facility]


   Last Admin: 11/27/18 09:07 Dose:  Not Given


Nystatin (Nystop Topical Powder)  0 gm TOP Q8H [facility]


   Last Admin: 12/01/18 13:18 Dose:  1 applic


Pantoprazole Sodium (Protonix Inj)  40 mg IVP DAILY [facility]


   Last Admin: 12/01/18 10:41 Dose:  40 mg


Prednisolone Acetate (Pred Forte 1% Opht Susp)  0 ml OS DAILY [facility]


   Last Admin: 12/01/18 10:41 Dose:  1 drop


Risperidone (Risperdal Oral Soln)  0.5 mg PO HS [facility]; Protocol


   Last Admin: 12/01/18 01:37 Dose:  Not Given


Thiamine HCl (Vitamin B1 Tab)  100 mg PO BID [facility]


   Last Admin: 12/01/18 10:40 Dose:  100 mg


Ziprasidone (Geodon Inj)  10 mg IM Q12 PRN; Protocol


   PRN Reason: severe agitaiton/psychosis


   Last Admin: 11/30/18 21:54 Dose:  10 mg











- Labs


Labs: 


                                        





                                 12/01/18 09:20 





                                 12/01/18 09:20 





                                        











PT  14.2 SECONDS (9.4-12.5)  H  11/27/18  05:15    


 


INR  1.23   11/27/18  05:15    


 


APTT  32.3 Seconds (25.1-36.5)   11/26/18  18:56    














Assessment and Plan





- Assessment and Plan (Free Text)


Plan: 





Pt seen and examined.  This is a late entry. I have reviewed the note of the 

medical resident and agree with it. I have reviewed the meds and labs of the pt.

I have discussed the assessment and plan with the resident. Pt with AAA and was 

seen by IR, Dr Mcintosh. Pt with Delerium with Dementia- Alzh. He is not able to 

take PO meds at times. He is on Duoneb for COPD. He is on ASA for CAD. Family 

has been updated. I spoke with IR about the AAA. Will need to keep BP under 

control.

## 2018-11-30 NOTE — CON
DATE OF CONSULTATION:  11/29/2018



TIME:  03:20 p.m.



CHIEF COMPLAINT/HISTORY OF MEDICAL ILLNESS:  This is an 82-year-old

gentleman who was admitted with mental status changes likely related to a

superimposed pneumonia.  He remains somewhat lethargic and combative at

times.  He has a history of hearing impairment.



Abdominal CT scan revealed a 5.2 cm infrarenal abdominal aortic aneurysm. 

The neck is somewhat ectatic and lobulated.  The patient has palpable

femoral pulses.



PAST MEDICAL HISTORY:  Significant for coronary artery disease, status post

PCI, hypertension, COPD, and history of subdural hematomas x2.



ASSESSMENT AND PLAN:  I spoke briefly with Dr. Tolentino about the

patient.  Given his current mental status and age, I would

prefer to follow the aneurysm conservatively.  Anatomically he could be a

candidate for stent graft repair, but I think this may be a little too

aggressive given his multiple medical issues.  I will reach out to his wife

and discuss the situation with her.  At the present time, we will not go

forward with the workup for aneurysm repair.





__________________________________________

Patrick Mcintosh MD





DD:  11/29/2018 15:24:15

DT:  11/29/2018 15:26:15

Job # [id number]

MTDD

## 2018-11-30 NOTE — CP.PCM.PN
Subjective





- Date & Time of Evaluation


Date of Evaluation: 11/30/18


Time of Evaluation: 14:15





- Subjective


Subjective: 


Infectious Disease Follow Up:


November 30, 2018





81 [place] [facility] [place] days of [place] and worsening agitation.  

Unable to obtain further information from the patient as he is currently sedated

with Ativan.  History and events obtained from family, staff, and hospital 

chart.  The PMHx includes HTN, COPD, hx of small bilateral subdural hematoma 

secondary to trauma from fall without neurosurgical intervention(4/2018), 

hypothyroidism, HLD, CAD s/p cardiac stent 2003.  As per wife, the patient was 

having odd behavior 6 days prior to admission with disorganized and incoherent 

speech.  The patient has become more agitated and restless each day.  He was 

found to be tachycardic with low grade fevers up to 100.5 F.





He remains confused and is currently in soft restraints.  The patient's wife is 

in the room.  No other sick contacts.  [facility]  He received steroid eye drops and ophthalmic antibiotics 

since that time.  As per the wife, the patient has been able to recognize her 

once in while during her visit.  He is otherwise confused and restless.





No improvement mentally from yesterday.  WBC increased yesterday but improved to

13 yesterday.  Chest X-ray yesterday showed no active disease.





On Zosyn and Vancomycin IV.








Objective





- Vital Signs/Intake and Output


Vital Signs (last 24 hours): 


                                        











Temp Pulse Resp BP Pulse Ox


 


 99.9 F H  100 H  22   144/80   94 L


 


 11/30/18 14:00  11/30/18 14:34  11/30/18 14:00  11/30/18 14:34  11/30/18 14:00








Intake and Output: 


                                        











 11/30/18 11/30/18





 06:59 18:59


 


Intake Total 0 


 


Balance 0 














- Medications


Medications: 


                               Current Medications





Acetaminophen (Tylenol 325mg Tab)  650 mg PO Q6H PRN


   PRN Reason: Fever >100.4 F


Acetaminophen (Tylenol 325 Mg Supp)  325 mg RC Q6H PRN


   PRN Reason: Fever >100.4 F


Albuterol/Ipratropium (Duoneb 3 Mg/0.5 Mg (3 Ml) Ud)  3 ml IH Q2H PRN


   PRN Reason: Shortness of Breath


   Last Admin: 11/29/18 22:33 Dose:  3 ml


Albuterol/Ipratropium (Duoneb 3 Mg/0.5 Mg (3 Ml) Ud)  3 ml IH [id number] [facility]


   Last Admin: 11/30/18 13:02 Dose:  3 ml


Amlodipine Besylate (Norvasc)  10 mg PO DAILY [facility]


   Last Admin: 11/30/18 12:16 Dose:  Not Given


Aspirin (Ecotrin)  81 mg PO DAILY [facility]


   Last Admin: 11/30/18 12:16 Dose:  Not Given


Atorvastatin Calcium (Lipitor)  40 mg PO DAILY [facility]


   Last Admin: 11/30/18 12:16 Dose:  Not Given


Brimonidine Tartrate (Alphagan P 0.15% Opht)  1 drop OS Q8H [facility]


   Last Admin: 11/29/18 18:20 Dose:  1 drop


Cholecalciferol (Vitamin D)  2,000 intlu PO DAILY [facility]


   Last Admin: 11/30/18 12:18 Dose:  Not Given


Clonidine HCl (Catapres-Tts2 0.2 Mg/24 Hr)  1 patch TD Q7D@1000 [facility]


   Last Admin: 11/28/18 17:17 Dose:  1 patch


Diltiazem HCl (Cardizem)  30 mg PO QID [facility]


   Last Admin: 11/30/18 14:34 Dose:  Not Given


Enalaprilat (Vasotec Iv)  1.25 mg IVP Q6 [facility]


   Last Admin: 11/30/18 12:02 Dose:  1.25 mg


Home Med (Home Med)  0 unit OS TID [facility]


   Last Admin: 11/30/18 11:19 Dose:  1 unit


Hydralazine HCl (Apresoline)  10 mg IVP Q6 PRN


   PRN Reason: SBP > 160


   Last Admin: 11/29/18 05:57 Dose:  10 mg


Sodium Chloride (Sodium Chloride 0.9%)  1,000 mls @ 50 mls/hr IV .Q20H [facility]


   Last Admin: 11/29/18 04:41 Dose:  50 mls/hr


Vancomycin HCl (Vancomycin 1gm)  1 gm in 250 mls @ 167 mls/hr IVPB Q12H [facility]; 

Protocol


   Last Admin: 11/30/18 00:57 Dose:  167 mls/hr


Piperacillin Sod/Tazobactam Sod (Zosyn 3.375 In Ns 100ml)  100 mls @ 25 mls/hr 

IVPB Q8H [facility]; Protocol


   Last Admin: 11/30/18 09:08 Dose:  25 mls/hr


Levothyroxine Sodium (Synthroid)  125 mcg PO DAILY [facility]


   Last Admin: 11/28/18 09:38 Dose:  Not Given


Levothyroxine Sodium (Synthroid)  60 mcg IVP DAILY [facility]


   Last Admin: 11/30/18 10:56 Dose:  60 mcg


Lisinopril (Zestril)  20 mg PO DAILY [facility]


   Last Admin: 11/27/18 09:09 Dose:  Not Given


Lorazepam (Ativan)  1 mg IVP Q6H [facility]; Protocol


   PRN Reason: Anxiety/Agitation


   Last Admin: 11/29/18 20:27 Dose:  1 mg


Metoprolol Succinate (Toprol Xl)  50 mg PO DAILY [facility]


   Last Admin: 11/27/18 09:07 Dose:  Not Given


Nystatin (Nystop Topical Powder)  0 gm TOP Q8H [facility]


   Last Admin: 11/29/18 14:42 Dose:  1 applic


Pantoprazole Sodium (Protonix Inj)  40 mg IVP DAILY [facility]


   Last Admin: 11/30/18 10:42 Dose:  40 mg


Prednisolone Acetate (Pred Forte 1% Opht Susp)  0 ml OS DAILY [facility]


   Last Admin: 11/30/18 12:17 Dose:  Not Given


Risperidone (Risperdal Oral Soln)  0.5 mg PO HS [facility]; Protocol


   Last Admin: 11/29/18 06:09 Dose:  Not Given


Thiamine HCl (Vitamin B1 Tab)  100 mg PO BID [facility]


   Last Admin: 11/30/18 12:17 Dose:  Not Given


Ziprasidone (Geodon Inj)  10 mg IM Q12 PRN; Protocol


   PRN Reason: severe agitaiton/psychosis


   Last Admin: 11/30/18 01:51 Dose:  10 mg











- Labs


Labs: 


                                        





                                 11/29/18 07:30 





                                 11/29/18 07:30 





                                        











PT  14.2 SECONDS (9.4-12.5)  H  11/27/18  05:15    


 


INR  1.23   11/27/18  05:15    


 


APTT  32.3 Seconds (25.1-36.5)   11/26/18  18:56    














- Constitutional


Appears: Confused, Chronically Ill





- Head Exam


Head Exam: ATRAUMATIC, NORMOCEPHALIC





- Eye Exam


Eye Exam: Normal appearance (Left eye only.)


Pupil Exam: Irregular, Unequal


Additional comments: 


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





- ENT Exam


ENT Exam: Mucous Membranes Moist, Normal External Ear Exam, TM's Normal 

Bilaterally





- Neck Exam


Neck Exam: Full ROM, Normal Inspection





- Respiratory Exam


Respiratory Exam: Clear to Ausculation Bilateral, NORMAL BREATHING PATTERN.  a

bsent: Rales, Rhonchi, Wheezes





- Cardiovascular Exam


Cardiovascular Exam: REGULAR RHYTHM, RRR, +S1, +S2





- GI/Abdominal Exam


GI & Abdominal Exam: Soft, Normal Bowel Sounds.  absent: Distended, Tenderness





- Extremities Exam


Extremities Exam: Full ROM, Normal Inspection





- Neurological Exam


Additional comments: 


Not following commands, restless, confused, incoherent usually during this 

hospitalization.  However, the patient has had periods of lucency as per wife.





- Psychiatric Exam


Additional comments: 


Confused and agitated when I saw patient.





- Skin


Skin Exam: Dry, Intact, Normal Color





Assessment and Plan





- Assessment and Plan (Free Text)


Assessment: 


81 yo  male brought in for AMS and Confusion.  CT of Abdomen showing 

Infrarenal Aortic Hernia and right lung base consolidation suggestive of a 

pneumonia.  On Rocephin and Vancomycin IV.  CT head did not show any bleeding in

the brain.  Procalcitonin sent.  Supportive care.  Pan cultures sent.  Mild 

leukocytosis.





Awaiting ammonia and tox screen.  Procalcitonin <0.05.  This argues against a 

septic or pneumonic process.  Ammonia < 9.





Patient with Hypothyroidism, CAD, COPD, and severe hearing impairment.





An LP may be useful on this patient.





If no improvement in the next 24 hours, will consider change of Rocephin to 

Zosyn for antibiotic coverage.  On Zosyn and Vancomycin now.  Noted 11/29/2018 

Chest X-ray showed no disease.





Thank you for allowing me to participate in the care of the patient, we will 

follow with you.

## 2018-11-30 NOTE — PN
DATE:  11/29/2018



SUBJECTIVE:  The patient was followed up.  The patient presented to be

lethargic.  The patient has episodes of lethargy alternating with restless

and agitated behavior.  The patient was diagnosed with pneumonia as well as

with hernia and right lung base consolidation, which is consistent with

pneumonia, and the patient presented to be very confused and delirious. 

This writer attempted to evaluate the patient today.  The patient presented

to be lethargic.  As per collaterals from the nursing staff, the patient

was restless and agitated and did not sleep overnight.



PHYSICAL EXAMINATION:

VITAL SIGNS:  Stable.  Temperature 98, pulse is 134, blood pressure

197/112, respirations 22, and oxygen saturation is 91.



MEDICATIONS:  Reviewed.  Majority of the medication, the patient was not

given because either the patient was on n.p.o. or the patient is lethargic.



LABORATORY DATA:  Labs reviewed.  The patient has leukocytosis, but it is

going down today.  Today, it is 13.0, hemoglobin and hematocrit 13.7 and

42.2, and granulocytes are 10.37.  Coagulation was reviewed.  Blood gas was

reviewed.  Chemistry was reviewed.  Procalcitonin is less than 0.05. 

Urinalysis showed blood moderate.



MENTAL STATUS EXAMINATION:  Not assessed, but the patient seems to be in

delirium stage and has hypoactive and hyperactive delirium, confusion, and

agitation.



IMPRESSION:  Delirium, which seems to be multifactorial.



PLAN:  The patient is on Risperdal at the nighttime, liquid form, but the

patient either is lethargic or agitated or n.p.o. and the patient was not

getting that medication.  Instead of that, Geodon as needed 10 mg twice a

day is ordered for psychosis and agitation, which alternates with Ativan 1

mg IV push every 6 hours as needed.  Supportive measures.  Family

involvement.  The patient is very hard of hearing as well as visually

impaired as well as confused.  I hope that the patient will start feeling

better.  Should you have any questions, give me a call back.



Thank you very much.





__________________________________________

Josiane Oneill MD







DD:  11/29/2018 15:58:02

DT:  11/29/2018 16:00:45

Job # [id number]

## 2018-12-01 LAB
ALBUMIN SERPL-MCNC: 3.8 G/DL (ref 3–4.8)
ALBUMIN/GLOB SERPL: 1.2 {RATIO} (ref 1.1–1.8)
ALT SERPL-CCNC: 38 U/L (ref 7–56)
ARTERIAL BLOOD GAS HEMOGLOBIN: 12.7 G/DL (ref 11.7–17.4)
ARTERIAL BLOOD GAS HEMOGLOBIN: 13.1 G/DL (ref 11.7–17.4)
ARTERIAL BLOOD GAS O2 SAT: 98.1 % (ref 95–98)
ARTERIAL BLOOD GAS O2 SAT: 99.3 % (ref 95–98)
ARTERIAL BLOOD GAS PCO2: 33 MM/HG (ref 35–45)
ARTERIAL BLOOD GAS PCO2: 36 MM/HG (ref 35–45)
ARTERIAL BLOOD GAS TCO2: 21.9 MMOL.L (ref 22–28)
ARTERIAL BLOOD GAS TCO2: 23.4 MMOL.L (ref 22–28)
AST SERPL-CCNC: 33 U/L (ref 17–59)
BASOPHILS # BLD AUTO: 0.03 K/MM3 (ref 0–2)
BASOPHILS NFR BLD: 0.2 % (ref 0–3)
BUN SERPL-MCNC: 18 MG/DL (ref 7–21)
CALCIUM SERPL-MCNC: 9 MG/DL (ref 8.4–10.5)
EOSINOPHIL # BLD: 0.1 10*3/UL (ref 0–0.7)
EOSINOPHIL NFR BLD: 0.4 % (ref 1.5–5)
ERYTHROCYTE [DISTWIDTH] IN BLOOD BY AUTOMATED COUNT: 14.6 % (ref 11.5–14.5)
GFR NON-AFRICAN AMERICAN: > 60
GRANULOCYTES # BLD: 13.08 10*3/UL (ref 1.4–6.5)
GRANULOCYTES NFR BLD: 82.1 % (ref 50–68)
HCO3 BLDA-SCNC: 20.9 MMOL/L (ref 21–28)
HCO3 BLDA-SCNC: 22.3 MMOL/L (ref 21–28)
HGB BLD-MCNC: 14 G/DL (ref 14–18)
INHALED O2 CONCENTRATION: 32 %
INHALED O2 CONCENTRATION: 40 %
LYMPHOCYTES # BLD: 1.3 10*3/UL (ref 1.2–3.4)
LYMPHOCYTES NFR BLD AUTO: 8.2 % (ref 22–35)
MCH RBC QN AUTO: 31.3 PG (ref 25–35)
MCHC RBC AUTO-ENTMCNC: 32.5 G/DL (ref 31–37)
MCV RBC AUTO: 96.4 FL (ref 80–105)
MONOCYTES # BLD AUTO: 1.5 10*3/UL (ref 0.1–0.6)
MONOCYTES NFR BLD: 9.1 % (ref 1–6)
O2 CAP BLDA-SCNC: 17.5 ML/DL (ref 16–24)
O2 CAP BLDA-SCNC: 18.1 ML/DL (ref 16–24)
O2 CT BLDA-SCNC: 17.2 ML/DL (ref 15–23)
O2 CT BLDA-SCNC: 18 ML/DL (ref 15–23)
PH BLDA: 7.4 [PH] (ref 7.35–7.45)
PH BLDA: 7.41 [PH] (ref 7.35–7.45)
PLATELET # BLD: 274 10^3/UL (ref 120–450)
PMV BLD AUTO: 9.8 FL (ref 7–11)
PO2 BLDA: 139 MM/HG (ref 80–100)
PO2 BLDA: 82 MM/HG (ref 80–100)
RBC # BLD AUTO: 4.47 10^6/UL (ref 3.5–6.1)
WBC # BLD AUTO: 15.9 10^3/UL (ref 4.5–11)

## 2018-12-01 RX ADMIN — LEVOTHYROXINE SODIUM ANHYDROUS SCH MCG: 100 INJECTION, POWDER, LYOPHILIZED, FOR SOLUTION INTRAVENOUS at 10:33

## 2018-12-01 RX ADMIN — ENALAPRILAT SCH MG: 1.25 INJECTION, SOLUTION INTRAVENOUS at 13:13

## 2018-12-01 RX ADMIN — IPRATROPIUM BROMIDE AND ALBUTEROL SULFATE PRN ML: .5; 3 SOLUTION RESPIRATORY (INHALATION) at 23:46

## 2018-12-01 RX ADMIN — VANCOMYCIN HYDROCHLORIDE SCH MLS/HR: 1 INJECTION, POWDER, LYOPHILIZED, FOR SOLUTION INTRAVENOUS at 01:38

## 2018-12-01 RX ADMIN — IPRATROPIUM BROMIDE AND ALBUTEROL SULFATE SCH ML: .5; 3 SOLUTION RESPIRATORY (INHALATION) at 07:28

## 2018-12-01 RX ADMIN — BRIMONIDINE TARTRATE SCH DROP: 1.5 SOLUTION/ DROPS OPHTHALMIC at 13:17

## 2018-12-01 RX ADMIN — PIPERACILLIN AND TAZOBACTAM SCH MLS/HR: 3; .375 INJECTION, POWDER, LYOPHILIZED, FOR SOLUTION INTRAVENOUS; PARENTERAL at 16:58

## 2018-12-01 RX ADMIN — PIPERACILLIN AND TAZOBACTAM SCH MLS/HR: 3; .375 INJECTION, POWDER, LYOPHILIZED, FOR SOLUTION INTRAVENOUS; PARENTERAL at 01:24

## 2018-12-01 RX ADMIN — IPRATROPIUM BROMIDE AND ALBUTEROL SULFATE PRN ML: .5; 3 SOLUTION RESPIRATORY (INHALATION) at 18:07

## 2018-12-01 RX ADMIN — ENALAPRILAT SCH: 1.25 INJECTION, SOLUTION INTRAVENOUS at 05:33

## 2018-12-01 RX ADMIN — ENALAPRILAT SCH MG: 1.25 INJECTION, SOLUTION INTRAVENOUS at 17:21

## 2018-12-01 RX ADMIN — VANCOMYCIN HYDROCHLORIDE SCH MLS/HR: 1 INJECTION, POWDER, LYOPHILIZED, FOR SOLUTION INTRAVENOUS at 02:09

## 2018-12-01 RX ADMIN — ENALAPRILAT SCH MG: 1.25 INJECTION, SOLUTION INTRAVENOUS at 05:35

## 2018-12-01 RX ADMIN — NYSTATIN SCH APPLIC: 100000 POWDER TOPICAL at 21:11

## 2018-12-01 RX ADMIN — PIPERACILLIN AND TAZOBACTAM SCH MLS/HR: 3; .375 INJECTION, POWDER, LYOPHILIZED, FOR SOLUTION INTRAVENOUS; PARENTERAL at 09:20

## 2018-12-01 RX ADMIN — IPRATROPIUM BROMIDE AND ALBUTEROL SULFATE SCH ML: .5; 3 SOLUTION RESPIRATORY (INHALATION) at 02:30

## 2018-12-01 RX ADMIN — IPRATROPIUM BROMIDE AND ALBUTEROL SULFATE SCH ML: .5; 3 SOLUTION RESPIRATORY (INHALATION) at 21:01

## 2018-12-01 RX ADMIN — NYSTATIN SCH APPLIC: 100000 POWDER TOPICAL at 13:18

## 2018-12-01 RX ADMIN — ENALAPRILAT SCH MG: 1.25 INJECTION, SOLUTION INTRAVENOUS at 23:14

## 2018-12-01 RX ADMIN — BRIMONIDINE TARTRATE SCH DROP: 1.5 SOLUTION/ DROPS OPHTHALMIC at 01:25

## 2018-12-01 RX ADMIN — PIPERACILLIN AND TAZOBACTAM SCH MLS/HR: 3; .375 INJECTION, POWDER, LYOPHILIZED, FOR SOLUTION INTRAVENOUS; PARENTERAL at 23:15

## 2018-12-01 RX ADMIN — VANCOMYCIN HYDROCHLORIDE SCH MLS/HR: 1 INJECTION, POWDER, LYOPHILIZED, FOR SOLUTION INTRAVENOUS at 13:12

## 2018-12-01 RX ADMIN — VITAMIN D, TAB 1000IU (100/BT) SCH INTLU: 25 TAB at 10:40

## 2018-12-01 RX ADMIN — BRIMONIDINE TARTRATE SCH DROP: 1.5 SOLUTION/ DROPS OPHTHALMIC at 21:11

## 2018-12-01 RX ADMIN — PREDNISOLONE ACETATE SCH DROP: 10 SUSPENSION/ DROPS OPHTHALMIC at 10:41

## 2018-12-01 RX ADMIN — IPRATROPIUM BROMIDE AND ALBUTEROL SULFATE SCH ML: .5; 3 SOLUTION RESPIRATORY (INHALATION) at 13:07

## 2018-12-01 RX ADMIN — ENALAPRILAT SCH MG: 1.25 INJECTION, SOLUTION INTRAVENOUS at 05:34

## 2018-12-01 RX ADMIN — ENALAPRILAT SCH MG: 1.25 INJECTION, SOLUTION INTRAVENOUS at 00:50

## 2018-12-01 NOTE — CP.PCM.PN
Subjective





- Date & Time of Evaluation


Date of Evaluation: 12/01/18


Time of Evaluation: 16:00





- Subjective


Subjective: 


Infectious Disease Follow Up:


December 1, 2018





81 yo male presented to [facility] [place] with several days of AMS and worsening agitation.  

Unable to obtain further information from the patient as he is currently sedated

with Ativan.  History and events obtained from family, staff, and hospital 

chart.  The PMHx includes HTN, COPD, hx of small bilateral subdural hematoma 

secondary to trauma from fall without neurosurgical intervention(4/2018), 

hypothyroidism, HLD, CAD s/p cardiac stent 2003.  As per wife, the patient was 

having odd behavior 6 days prior to admission with disorganized and incoherent 

speech.  The patient has become more agitated and restless each day.  He was 

found to be tachycardic with low grade fevers up to 100.5 F.





He remains confused and is currently in soft restraints.  The patient's wife is 

in the room.  No other sick contacts.  [facility].  He received steroid eye drops and ophthalmic antibiotics 

since that time.  As per the wife, the patient has been able to recognize her 

once in while during her visit.  He is otherwise confused and restless.





No improvement mentally from yesterday.  WBC increased yesterday but improved to

13 yesterday.  Chest X-ray yesterday showed no active disease.  Repeat Chest X-

ray today describes a different picture with consolidative changes seen.





On Zosyn and Vancomycin IV.








Objective





- Vital Signs/Intake and Output


Vital Signs (last 24 hours): 


                                        











Temp Pulse Resp BP Pulse Ox


 


 100 F H  96 H  30 H  120/82   97 


 


 12/01/18 14:35  12/01/18 17:22  12/01/18 14:35  12/01/18 17:22  12/01/18 14:35








Intake and Output: 


                                        











 12/01/18 12/01/18





 06:59 18:59


 


Intake Total 0 


 


Balance 0 














- Medications


Medications: 


                               Current Medications





Acetaminophen (Tylenol 325mg Tab)  650 mg PO Q6H PRN


   PRN Reason: Fever >100.4 F


Acetaminophen (Tylenol 325 Mg Supp)  325 mg RC Q6H PRN


   PRN Reason: Fever >100.4 F


Albuterol/Ipratropium (Duoneb 3 Mg/0.5 Mg (3 Ml) Ud)  3 ml IH Q2H PRN


   PRN Reason: Shortness of Breath


   Last Admin: 11/29/18 22:33 Dose:  3 ml


Albuterol/Ipratropium (Duoneb 3 Mg/0.5 Mg (3 Ml) Ud)  3 ml IH [id number] [facility]


   Last Admin: 12/01/18 13:07 Dose:  3 ml


Amlodipine Besylate (Norvasc)  10 mg PO DAILY [facility]


   Last Admin: 12/01/18 10:29 Dose:  10 mg


Aspirin (Ecotrin)  81 mg PO DAILY [facility]


   Last Admin: 12/01/18 10:32 Dose:  81 mg


Atorvastatin Calcium (Lipitor)  40 mg PO DAILY [facility]


   Last Admin: 12/01/18 10:30 Dose:  40 mg


Brimonidine Tartrate (Alphagan P 0.15% Opht)  1 drop OS Q8H [facility]


   Last Admin: 12/01/18 13:17 Dose:  1 drop


Cholecalciferol (Vitamin D)  2,000 intlu PO DAILY [facility]


   Last Admin: 12/01/18 10:40 Dose:  2,000 intlu


Clonidine HCl (Catapres-Tts2 0.2 Mg/24 Hr)  1 patch TD Q7D@1000 [facility]


   Last Admin: 11/28/18 17:17 Dose:  1 patch


Diltiazem HCl (Cardizem)  30 mg PO QID [facility]


   Last Admin: 12/01/18 17:22 Dose:  Not Given


Enalaprilat (Vasotec Iv)  1.25 mg IVP Q6 [facility]


   Last Admin: 12/01/18 17:21 Dose:  1.25 mg


Home Med (Home Med)  0 unit OS TID [facility]


   Last Admin: 12/01/18 17:24 Dose:  1 unit


Hydralazine HCl (Apresoline)  10 mg IVP Q6 PRN


   PRN Reason: SBP > 160


   Last Admin: 12/01/18 10:26 Dose:  10 mg


Sodium Chloride (Sodium Chloride 0.9%)  1,000 mls @ 50 mls/hr IV .Q20H [facility]


   Last Admin: 12/01/18 13:15 Dose:  50 mls/hr


Vancomycin HCl (Vancomycin 1gm)  1 gm in 250 mls @ 167 mls/hr IVPB Q12H [facility]; 

Protocol


   Last Admin: 12/01/18 13:12 Dose:  167 mls/hr


Piperacillin Sod/Tazobactam Sod (Zosyn 3.375 In Ns 100ml)  100 mls @ 25 mls/hr 

IVPB Q8H [facility]; Protocol


   Last Admin: 12/01/18 16:58 Dose:  25 mls/hr


Levothyroxine Sodium (Synthroid)  125 mcg PO DAILY [facility]


   Last Admin: 11/28/18 09:38 Dose:  Not Given


Levothyroxine Sodium (Synthroid)  60 mcg IVP DAILY [facility]


   Last Admin: 12/01/18 10:33 Dose:  60 mcg


Lisinopril (Zestril)  20 mg PO DAILY [facility]


   Last Admin: 11/27/18 09:09 Dose:  Not Given


Lorazepam (Ativan)  0.5 mg IVP Q6H [facility]; Protocol


   PRN Reason: Anxiety/Agitation


   Last Admin: 12/01/18 12:51 Dose:  0.5 mg


Metoprolol Succinate (Toprol Xl)  50 mg PO DAILY [facility]


   Last Admin: 11/27/18 09:07 Dose:  Not Given


Nystatin (Nystop Topical Powder)  0 gm TOP Q8H [facility]


   Last Admin: 12/01/18 13:18 Dose:  1 applic


Pantoprazole Sodium (Protonix Inj)  40 mg IVP DAILY [facility]


   Last Admin: 12/01/18 10:41 Dose:  40 mg


Prednisolone Acetate (Pred Forte 1% Opht Susp)  0 ml OS DAILY [facility]


   Last Admin: 12/01/18 10:41 Dose:  1 drop


Risperidone (Risperdal Oral Soln)  0.5 mg PO HS [facility]; Protocol


   Last Admin: 12/01/18 01:37 Dose:  Not Given


Thiamine HCl (Vitamin B1 Tab)  100 mg PO BID [facility]


   Last Admin: 12/01/18 10:40 Dose:  100 mg


Ziprasidone (Geodon Inj)  10 mg IM Q12 PRN; Protocol


   PRN Reason: severe agitaiton/psychosis


   Last Admin: 11/30/18 21:54 Dose:  10 mg











- Labs


Labs: 


                                        





                                 12/01/18 09:20 





                                 12/01/18 09:20 





                                        











PT  14.2 SECONDS (9.4-12.5)  H  11/27/18  05:15    


 


INR  1.23   11/27/18  05:15    


 


APTT  32.3 Seconds (25.1-36.5)   11/26/18  18:56    














- Constitutional


Appears: Confused, Chronically Ill





- Head Exam


Head Exam: ATRAUMATIC, NORMOCEPHALIC





- Eye Exam


Eye Exam: EOMI, Normal appearance (Left eye.)


Pupil Exam: Irregular, Unequal


Additional comments: 


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





- ENT Exam


ENT Exam: Mucous Membranes Moist, Normal External Ear Exam, TM's Normal 

Bilaterally





- Neck Exam


Neck Exam: Full ROM, Normal Inspection





- Respiratory Exam


Respiratory Exam: Clear to Ausculation Bilateral, NORMAL BREATHING PATTERN.  

absent: Rales, Rhonchi, Wheezes





- Cardiovascular Exam


Cardiovascular Exam: REGULAR RHYTHM, RRR, +S1, +S2





- GI/Abdominal Exam


GI & Abdominal Exam: Soft, Normal Bowel Sounds.  absent: Distended, Tenderness





- Extremities Exam


Extremities Exam: Full ROM, Normal Inspection





- Neurological Exam


Additional comments: 


Not following commands, restless, confused, incoherent usually during this 

hospitalization.  However, the patient has had periods of lucency as per wife.





- Psychiatric Exam


Additional comments: 


Confused and agitated when I saw patient.





- Skin


Skin Exam: Dry, Intact, Normal Color





Assessment and Plan





- Assessment and Plan (Free Text)


Assessment: 


81 yo  male brought in for AMS and Confusion.  CT of Abdomen showing 

Infrarenal Aortic Hernia and right lung base consolidation suggestive of a 

pneumonia.  On Rocephin and Vancomycin IV.  CT head did not show any bleeding in

the brain.  Procalcitonin sent.  Supportive care.  Pan cultures sent.  Mild 

leukocytosis.





Awaiting ammonia and tox screen.  Procalcitonin <0.05.  This argues against a 

septic or pneumonic process.  Ammonia < 9.





Patient with Hypothyroidism, CAD, COPD, and severe hearing impairment.





An LP may be useful on this patient.





If no improvement in the next 24 hours, will consider change of Rocephin to 

Zosyn for antibiotic coverage.  On Zosyn and Vancomycin now.  Noted 11/29/2018 

Chest X-ray showed no disease.





Leukocytosis of 15.9 today.  Cultures negative.





Thank you for allowing me to participate in the care of the patient, we will 

follow with you.

## 2018-12-01 NOTE — PN
DATE:  12/01/2018



SUBJECTIVE:  Mr. Salazar is an 82-year-old male with altered mental

status, he has worsening agitation.  Rapid response was called this morning

because of the respiratory distress.  Currently on Ativan.  History of

bilateral subdural hematoma, status post multiple falls.  He was recently

evaluated by Dr. Patrick Mcintosh for abdominal aortic aneurysm 5.2 cm, not a

candidate for surgical repair.  No improvement in altered mental status.

respiratory distress present.



PHYSICAL EXAMINATION:

VITAL SIGNS:  Heart rate is 100 per minute, respiratory rate is 25 per

minute, blood pressure 140/80, oxygen saturation 94% on oxygen by nasal

cannula.

respiratory distress present.

HEENT:  Pallor positive.  Atraumatic head.

NECK:  No lymphadenopathy.

CHEST:  Air entry present and equal bilateral.  No added sound.

CARDIOVASCULAR:  S1, S2 normal.  No murmur, no gallop.

ABDOMEN:  Soft, nontender.  No hepatosplenomegaly.

EXTREMITY:  No edema.

NEUROLOGIC:  Confused and disoriented.



LABORATORY DATA:  White count 13, hemoglobin 13.7, hematocrit 42, platelet

270.  Sodium 146, potassium 3.8, BUN 18, creatinine 0.9, glucose 115.  INR

was 1.2.



MEDICATIONS:  Tylenol 650 every 6 hours p.r.n., DuoNeb 3 mL every 6 hours

p.r.n., Norvasc 10 mg daily, aspirin 81 mg daily, Lipitor 40 mg daily,

Catapres _____ diltiazem 30 mg p.o. four times a day, enalapril 12.5 mg IV

every 6 hours, Synthroid 125 mcg p.o. daily, Zestril 20 mg daily,

metoprolol 50 mg daily, nystatin local application, Zosyn, Protonix,

thiamine, vancomycin.



Blood culture and urine culture were negative.



ASSESSMENT:  Anemia, leukocytosis, right lower lobe pneumonia, altered

mental status, bilateral subdural hematoma, hypothyroidism.



PLAN: respiratory distress. Spoke to ICU team for evaluation and transfer to 
ICU.

Consult requested for Intensivist. 

 He is currently on IV antibiotics, ceftriaxone and vancomycin.  ID

doctor will follow him.  CT had no bleeding.  DuoNeb will continue. 

Hypothyroidism, continue Synthroid 125 mcg.  COPD, he is on bronchodilators

and IV antibiotics.  Aortic aneurysm, not a candidate for repair at the

moment.  Dr. Patrick Mcintosh discussed with the wife.  White count elevated at

15.9, we will continue to monitor.  Hemoglobin stable at 14.  Renal

function is within normal limits.  Electrolytes normal.







__________________________________________

Angela Warner MD



DD:  12/01/2018 15:54:52

DT:  12/01/2018 16:33:38

Job # [id number]

[email or web address]

## 2018-12-01 NOTE — CP.PCM.CON
History of Present Illness





- History of Present Illness


History of Present Illness: 


MICU CONSULTATION:





This is an 82M admitted with AMS and worsening agitation.   History and events 

obtained staff and hospital chart because patient is obtunded.  He has a hx of 

HTN, COPD, small bilateral subdural hematoma secondary to trauma from fall 

without neurosurgical intervention(4/2018), hypothyroidism, HLD, CAD s/p cardiac

stent 2003.  


Mentation has not improved since admission. He has a CT head that did not show a

stroke, CXR did not show acute infiltrate.  Lab work up unremarkable thus far.  

He is slight more hyperNa and hyperCl now. Cultures have been negative. Ammonia 

was < 9. Rapid resp this morning prompted ABG which was 7.41/33/85 on 3lNC.





PMHx: HTN, COPD, hx of small bilateral subdural hematoma secondary to trauma 

from fall without neurosurgical intervention(4/2018), hypothyroidism, HLD, CAD 

s/p cardiac stent 2003





PSHx: Cataract surgery 11/2018





Allergies: NKDA





Social Hx: Ex-tobacco user stopped 10 years ago with 50 pack year history; No 

EtOH or illicit drug use





Past Patient History





- Infectious Disease


Hx of Infectious Diseases: None





- Tetanus Immunizations


Tetanus Immunization: Up to Date





- Past Social History


Smoking Status: Former Smoker


Alcohol: None


Drugs: Denies





- CARDIAC


Hx Hypertension: Yes





- PULMONARY


Hx Chronic Obstructive Pulmonary Disease (COPD): Yes





- NEUROLOGICAL


Hx Neurological Disorder: Yes





- HEENT


Hx HEENT Problems: Yes


Hx Blind: Yes (RIGHT EYE)


Hx Cataracts: Yes (LEFT EYE)


Hx Deafness: Yes (RIGHT EAR)





- RENAL


Hx Chronic Kidney Disease: No





- HEMATOLOGICAL/ONCOLOGICAL


Hx Blood Disorders: No





- INTEGUMENTARY


Hx Dermatological Problems: No





- MUSCULOSKELETAL/RHEUMATOLOGICAL


Hx Musculoskeletal Disorders: No


Hx Falls: Yes





- GASTROINTESTINAL


Hx Gastrointestinal Disorders: No





- GENITOURINARY/GYNECOLOGICAL


Hx Genitourinary Disorders: No





- PSYCHIATRIC


Hx Depression: No


Hx Emotional Abuse: No


Hx Physical Abuse: No


Hx Substance Use: No





- SURGICAL HISTORY


Hx Surgeries: Yes (CARDIAC STENT 2003, R EYE SX, AP, T&A)





- ANESTHESIA


Hx Anesthesia: Yes


Hx Anesthesia Reactions: No


Hx Malignant Hyperthermia: No





Meds


Allergies/Adverse Reactions: 


                                    Allergies











Allergy/AdvReac Type Severity Reaction Status Date / Time


 


No Known Allergies Allergy   Verified 04/28/18 15:33














- Medications


Medications: 


                               Current Medications





Acetaminophen (Tylenol 325mg Tab)  650 mg PO Q6H PRN


   PRN Reason: Fever >100.4 F


Acetaminophen (Tylenol 325 Mg Supp)  325 mg RC Q6H PRN


   PRN Reason: Fever >100.4 F


Albuterol/Ipratropium (Duoneb 3 Mg/0.5 Mg (3 Ml) Ud)  3 ml IH Q2H PRN


   PRN Reason: Shortness of Breath


   Last Admin: 12/01/18 18:07 Dose:  3 ml


Albuterol/Ipratropium (Duoneb 3 Mg/0.5 Mg (3 Ml) Ud)  3 ml IH [id number] [facility]


   Last Admin: 12/01/18 13:07 Dose:  3 ml


Amlodipine Besylate (Norvasc)  10 mg PO DAILY [facility]


   Last Admin: 12/01/18 10:29 Dose:  10 mg


Aspirin (Ecotrin)  81 mg PO DAILY [facility]


   Last Admin: 12/01/18 10:32 Dose:  81 mg


Atorvastatin Calcium (Lipitor)  40 mg PO DAILY [facility]


   Last Admin: 12/01/18 10:30 Dose:  40 mg


Brimonidine Tartrate (Alphagan P 0.15% Opht)  1 drop OS Q8H [facility]


   Last Admin: 12/01/18 13:17 Dose:  1 drop


Cholecalciferol (Vitamin D)  2,000 intlu PO DAILY [facility]


   Last Admin: 12/01/18 10:40 Dose:  2,000 intlu


Clonidine HCl (Catapres-Tts2 0.2 Mg/24 Hr)  1 patch TD Q7D@1000 [facility]


   Last Admin: 11/28/18 17:17 Dose:  1 patch


Diltiazem HCl (Cardizem)  30 mg PO QID [facility]


   Last Admin: 12/01/18 17:22 Dose:  Not Given


Enalaprilat (Vasotec Iv)  1.25 mg IVP Q6 [facility]


   Last Admin: 12/01/18 17:21 Dose:  1.25 mg


Home Med (Home Med)  0 unit OS TID [facility]


   Last Admin: 12/01/18 17:24 Dose:  1 unit


Hydralazine HCl (Apresoline)  10 mg IVP Q6 PRN


   PRN Reason: SBP > 160


   Last Admin: 12/01/18 10:26 Dose:  10 mg


Sodium Chloride (Sodium Chloride 0.9%)  1,000 mls @ 50 mls/hr IV .Q20H [facility]


   Last Admin: 12/01/18 13:15 Dose:  50 mls/hr


Vancomycin HCl (Vancomycin 1gm)  1 gm in 250 mls @ 167 mls/hr IVPB Q12H [facility]; 

Protocol


   Last Admin: 12/01/18 13:12 Dose:  167 mls/hr


Piperacillin Sod/Tazobactam Sod (Zosyn 3.375 In Ns 100ml)  100 mls @ 25 mls/hr 

IVPB Q8H [facility]; Protocol


   Last Admin: 12/01/18 16:58 Dose:  25 mls/hr


Levothyroxine Sodium (Synthroid)  125 mcg PO DAILY [facility]


   Last Admin: 11/28/18 09:38 Dose:  Not Given


Levothyroxine Sodium (Synthroid)  60 mcg IVP DAILY [facility]


   Last Admin: 12/01/18 10:33 Dose:  60 mcg


Lisinopril (Zestril)  20 mg PO DAILY [facility]


   Last Admin: 11/27/18 09:09 Dose:  Not Given


Lorazepam (Ativan)  0.5 mg IVP Q6H PRN; Protocol


   PRN Reason: Anxiety/Agitation


   Last Admin: 12/01/18 12:51 Dose:  0.5 mg


Metoprolol Succinate (Toprol Xl)  50 mg PO DAILY [facility]


   Last Admin: 11/27/18 09:07 Dose:  Not Given


Nystatin (Nystop Topical Powder)  0 gm TOP Q8H [facility]


   Last Admin: 12/01/18 13:18 Dose:  1 applic


Pantoprazole Sodium (Protonix Inj)  40 mg IVP DAILY [facility]


   Last Admin: 12/01/18 10:41 Dose:  40 mg


Prednisolone Acetate (Pred Forte 1% Opht Susp)  0 ml OS DAILY [facility]


   Last Admin: 12/01/18 10:41 Dose:  1 drop


Risperidone (Risperdal Oral Soln)  0.5 mg PO HS [facility]; Protocol


   Last Admin: 12/01/18 01:37 Dose:  Not Given


Thiamine HCl (Vitamin B1 Tab)  100 mg PO BID [facility]


   Last Admin: 12/01/18 10:40 Dose:  100 mg


Ziprasidone (Geodon Inj)  10 mg IM Q12 PRN; Protocol


   PRN Reason: severe agitaiton/psychosis


   Last Admin: 11/30/18 21:54 Dose:  10 mg











Results





- Vital Signs


Recent Vital Signs: 


                                Last Vital Signs











Temp  100 F H  12/01/18 14:35


 


Pulse  96 H  12/01/18 17:22


 


Resp  30 H  12/01/18 14:35


 


BP  120/82   12/01/18 17:22


 


Pulse Ox  97   12/01/18 14:35














- Labs


Result Diagrams: 


                                 12/01/18 09:20





                                 12/01/18 09:20


Labs: 


                         Laboratory Results - last 24 hr











  12/01/18 12/01/18 12/01/18





  08:06 08:15 09:20


 


WBC    15.9 H D


 


RBC    4.47


 


Hgb    14.0


 


Hct    43.1


 


MCV    96.4


 


MCH    31.3


 


MCHC    32.5


 


RDW    14.6 H


 


Plt Count    274


 


MPV    9.8


 


Gran %    82.1 H


 


Lymph % (Auto)    8.2 L


 


Mono % (Auto)    9.1 H


 


Eos % (Auto)    0.4 L


 


Baso % (Auto)    0.2


 


Gran #    13.08 H


 


Lymph # (Auto)    1.3


 


Mono # (Auto)    1.5 H


 


Eos # (Auto)    0.1


 


Baso # (Auto)    0.03


 


pCO2   33 L 


 


pO2   82.0 


 


HCO3   20.9 L 


 


ABG pH   7.41 


 


ABG Total CO2   21.9 L 


 


ABG O2 Saturation   98.1 H 


 


ABG O2 Content   17.2 


 


ABG Base Excess   -3.0 L 


 


ABG Hemoglobin   12.7 


 


ABG Carboxyhemoglobin   2.0 H 


 


POC ABG HHb (Measured)   1.9 


 


ABG Methemoglobin   0.3 


 


ABG O2 Capacity   17.5 


 


Hgb O2 Saturation   95.7 


 


FiO2   32.0 


 


Sodium   


 


Potassium   


 


Chloride   


 


Carbon Dioxide   


 


Anion Gap   


 


BUN   


 


Creatinine   


 


Est GFR ( Amer)   


 


Est GFR (Non-Af Amer)   


 


POC Glucose (mg/dL)  104  


 


Random Glucose   


 


Calcium   


 


Phosphorus   


 


Magnesium   


 


Total Bilirubin   


 


AST   


 


ALT   


 


Alkaline Phosphatase   


 


Total Protein   


 


Albumin   


 


Globulin   


 


Albumin/Globulin Ratio   














  12/01/18





  09:20


 


WBC 


 


RBC 


 


Hgb 


 


Hct 


 


MCV 


 


MCH 


 


MCHC 


 


RDW 


 


Plt Count 


 


MPV 


 


Gran % 


 


Lymph % (Auto) 


 


Mono % (Auto) 


 


Eos % (Auto) 


 


Baso % (Auto) 


 


Gran # 


 


Lymph # (Auto) 


 


Mono # (Auto) 


 


Eos # (Auto) 


 


Baso # (Auto) 


 


pCO2 


 


pO2 


 


HCO3 


 


ABG pH 


 


ABG Total CO2 


 


ABG O2 Saturation 


 


ABG O2 Content 


 


ABG Base Excess 


 


ABG Hemoglobin 


 


ABG Carboxyhemoglobin 


 


POC ABG HHb (Measured) 


 


ABG Methemoglobin 


 


ABG O2 Capacity 


 


Hgb O2 Saturation 


 


FiO2 


 


Sodium  153 H


 


Potassium  3.8


 


Chloride  119 H


 


Carbon Dioxide  23


 


Anion Gap  15


 


BUN  18


 


Creatinine  0.9


 


Est GFR ( Amer)  > 60


 


Est GFR (Non-Af Amer)  > 60


 


POC Glucose (mg/dL) 


 


Random Glucose  126 H


 


Calcium  9.0


 


Phosphorus  3.0


 


Magnesium  2.2


 


Total Bilirubin  1.4 H


 


AST  33


 


ALT  38


 


Alkaline Phosphatase  84


 


Total Protein  7.0


 


Albumin  3.8


 


Globulin  3.2


 


Albumin/Globulin Ratio  1.2














Assessment & Plan





- Assessment and Plan (Free Text)


Assessment: 





Overall this is an 82M with COPD/bullous emphysema, HTN, CAD who remains 

obtunded without a clear etiology. CT head was neg, chem relatively benign with 

normal ammonia. 





It is not due to hypercapnea given acceptable ABG.  At this point, he is 

demonstrating that he can still ventilate and his resp drive is in tact.  

Considering the ABG was from the AM, will repeat now and if he begins to retain 

PCO2, then will move to ICU. 





I would also give d5W and replaced free water deficit since he is not eating and

his sodium is climbing.


Consider LP and EEG





-Will f/u ABG (if relatively unchanged then will NOT require ICU level of care)





Vincent Bond MD


Pulmonary Critical Care and Sleep Medicine

## 2018-12-01 NOTE — CON
DATE OF CONSULTATION:  12/01/2018



HISTORY OF PRESENT ILLNESS:  The patient is an 82-year-old  male

with multiple medical issues.  Please refer to medical notes for

comprehensive details.  The patient has been followed by Psychiatry due to

episodes of agitation, disorientation/confusion, contact of delirium. 

Presentation is further complicated by the fact that patient has

periods of respiratory distress on the unit as well.  The patient has been

treated on the unit with Risperdal, Geodon, and Ativan.  He continues to

have episodes of agitation, restlessness, combative behavior and treatment

of this patient's delirium-associated behavioral issues has been tricky for

Psychiatry as primary goal is to calm the patient down, not necessarily to

sedate or knock him out~~medications that can potentially exacerbate his 
standing

respiratory distress.  I spoke with nursing.  He continues to be only oriented 
to himself and is not responsive

to commands.  Difficult to have any meaningful interview with him in this

respect.  His insight and judgment continues to be poor.  Prognosis is

guarded.



Vital signs and labs are reviewed by this provider.



PSYCHIATRIC MEDICATIONS:  Include Ativan 1 mg every 6 p.r.n., which the

patient received at 7:52 a.m. this morning as well as 1 time yesterday in

the afternoon.  Risperdal 0.5 mg at bedtime.  The patient has been unable

to take his medication.  He is refusing considering the fact that he is

unable to swallow and Geodon 10 mg every 12.  The patient has been

receiving his medication twice yesterday.  Last dose was last night at

2154, received 2 doses on 11/29/2018 and one dose on 11/28/2018.



IMPRESSION:  The patient has periods of hypoactive and multifactorial

delirium.



RECOMMENDATIONS:  At this time, we will continue with current management. 

I will change Ativan to 0.5 mg every 6 hours as needed for anxiety and

restless behavior in light of the patient's recent rapid response

due to respiratory distress this morning.  We will continue with Risperdal 0.5 
mg

if the patient is able to tolerate in liquid form, otherwise the patient

should continue with Geodon p.r.n.  Psychiatry will continue to follow up.





__________________________________________

Flex Amador MD







DD:  12/01/2018 11:21:30

DT:  12/01/2018 16:18:32

[facility] # [id number]

[email or web address]

## 2018-12-01 NOTE — RAD
Date of service: 



12/01/2018



HISTORY:

 sob 



COMPARISON:

Comparison made with chest radiograph 01/29/2018 as well as CT scan 

of the chest 11/26/2018 which imaged the mid to lower lung fields.  

The 



FINDINGS:



LUNGS:

Significant bullous emphysematous changes are less well seen on this 

study as compared to high-resolution CT abdomen pelvis which image 

both mid to lower lung fields..  Consolidation changes right lower 

lobe on the appear to have improved slightly. 



PLEURA:

No significant pleural effusion identified, no pneumothorax apparent.



CARDIOVASCULAR:

Persistent mediastinal shift from left-to-right.  Suspect mild aortic 

atherosclerotic calcification present.  There appears to be 

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 

aortic arch. 



Normal cardiac size. No pulmonary vascular congestion. 



OSSEOUS STRUCTURES:

No significant abnormalities.



VISUALIZED UPPER ABDOMEN:

Normal.



OTHER FINDINGS:

None.



IMPRESSION:

Persistent left-to-right mediastinal shift.  Questionable aneurysmal 

dilatation of the ascending and transverse portion of the aortic 

arch.  



Significant bullous emphysematous changes are less well seen on this 

study as compared to high-resolution CT abdomen pelvis which image 

both mid to lower lung fields..  Consolidation changes right lower 

lobe appear to have improved slightly...

## 2018-12-01 NOTE — CP.PCM.PN
<Fredo Lucero - Last Filed: 12/01/18 19:47>





Subjective





- Date & Time of Evaluation


Date of Evaluation: 12/01/18


Time of Evaluation: 08:20





- Subjective


Subjective: 





Progress Note for Dr. Tolentino Service





Patient seen and examined at bedside.  RRT this AM for rate of breathing (please

see RRT note for further details).  Patient at baseline breathing, remains 

altered and mostly incoherent.  BP/HR controlled.





Objective





- Vital Signs/Intake and Output


Vital Signs (last 24 hours): 


                                        











Temp Pulse Resp BP Pulse Ox


 


 99.1 F   119 H  28 H  168/105 H  96 


 


 12/01/18 06:00  12/01/18 06:00  12/01/18 06:00  12/01/18 06:00  12/01/18 06:00








Intake and Output: 


                                        











 12/01/18 12/01/18





 06:59 18:59


 


Intake Total 0 


 


Balance 0 














- Medications


Medications: 


                               Current Medications





Acetaminophen (Tylenol 325mg Tab)  650 mg PO Q6H PRN


   PRN Reason: Fever >100.4 F


Acetaminophen (Tylenol 325 Mg Supp)  325 mg RC Q6H PRN


   PRN Reason: Fever >100.4 F


Albuterol/Ipratropium (Duoneb 3 Mg/0.5 Mg (3 Ml) Ud)  3 ml IH Q2H PRN


   PRN Reason: Shortness of Breath


   Last Admin: 11/29/18 22:33 Dose:  3 ml


Albuterol/Ipratropium (Duoneb 3 Mg/0.5 Mg (3 Ml) Ud)  3 ml IH [id number] [facility]


   Last Admin: 12/01/18 07:28 Dose:  3 ml


Amlodipine Besylate (Norvasc)  10 mg PO DAILY [facility]


   Last Admin: 11/30/18 12:16 Dose:  Not Given


Aspirin (Ecotrin)  81 mg PO DAILY [facility]


   Last Admin: 11/30/18 12:16 Dose:  Not Given


Atorvastatin Calcium (Lipitor)  40 mg PO DAILY [facility]


   Last Admin: 11/30/18 12:16 Dose:  Not Given


Brimonidine Tartrate (Alphagan P 0.15% Opht)  1 drop OS Q8H [facility]


   Last Admin: 12/01/18 01:25 Dose:  1 drop


Cholecalciferol (Vitamin D)  2,000 intlu PO DAILY [facility]


   Last Admin: 11/30/18 12:18 Dose:  Not Given


Clonidine HCl (Catapres-Tts2 0.2 Mg/24 Hr)  1 patch TD Q7D@1000 [facility]


   Last Admin: 11/28/18 17:17 Dose:  1 patch


Diltiazem HCl (Cardizem)  30 mg PO QID [facility]


   Last Admin: 12/01/18 01:37 Dose:  Not Given


Enalaprilat (Vasotec Iv)  1.25 mg IVP Q6 [facility]


   Last Admin: 12/01/18 05:35 Dose:  1.25 mg


Home Med (Home Med)  0 unit OS TID [facility]


   Last Admin: 11/30/18 19:27 Dose:  Not Given


Hydralazine HCl (Apresoline)  10 mg IVP Q6 PRN


   PRN Reason: SBP > 160


   Last Admin: 11/29/18 05:57 Dose:  10 mg


Sodium Chloride (Sodium Chloride 0.9%)  1,000 mls @ 50 mls/hr IV .Q20H [facility]


   Last Admin: 11/29/18 04:41 Dose:  50 mls/hr


Vancomycin HCl (Vancomycin 1gm)  1 gm in 250 mls @ 167 mls/hr IVPB Q12H [facility]; 

Protocol


   Last Admin: 12/01/18 02:09 Dose:  167 mls/hr


Piperacillin Sod/Tazobactam Sod (Zosyn 3.375 In Ns 100ml)  100 mls @ 25 mls/hr 

IVPB Q8H [facility]; Protocol


   Last Admin: 12/01/18 01:24 Dose:  25 mls/hr


Levothyroxine Sodium (Synthroid)  125 mcg PO DAILY [facility]


   Last Admin: 11/28/18 09:38 Dose:  Not Given


Levothyroxine Sodium (Synthroid)  60 mcg IVP DAILY [facility]


   Last Admin: 11/30/18 10:56 Dose:  60 mcg


Lisinopril (Zestril)  20 mg PO DAILY [facility]


   Last Admin: 11/27/18 09:09 Dose:  Not Given


Lorazepam (Ativan)  1 mg IVP Q6H PRN; Protocol


   PRN Reason: Anxiety/Agitation


   Last Admin: 12/01/18 07:52 Dose:  1 mg


Metoprolol Succinate (Toprol Xl)  50 mg PO DAILY [facility]


   Last Admin: 11/27/18 09:07 Dose:  Not Given


Nystatin (Nystop Topical Powder)  0 gm TOP Q8H [facility]


   Last Admin: 11/30/18 15:10 Dose:  1 applic


Pantoprazole Sodium (Protonix Inj)  40 mg IVP DAILY [facility]


   Last Admin: 11/30/18 10:42 Dose:  40 mg


Prednisolone Acetate (Pred Forte 1% Opht Susp)  0 ml OS DAILY [facility]


   Last Admin: 11/30/18 12:17 Dose:  Not Given


Risperidone (Risperdal Oral Soln)  0.5 mg PO HS [facility]; Protocol


   Last Admin: 12/01/18 01:37 Dose:  Not Given


Thiamine HCl (Vitamin B1 Tab)  100 mg PO BID [facility]


   Last Admin: 11/30/18 19:28 Dose:  Not Given


Ziprasidone (Geodon Inj)  10 mg IM Q12 PRN; Protocol


   PRN Reason: severe agitaiton/psychosis


   Last Admin: 11/30/18 21:54 Dose:  10 mg











- Labs


Labs: 


                                        





                                 12/01/18 09:20 





                                 12/01/18 09:20 





                                        











PT  14.2 SECONDS (9.4-12.5)  H  11/27/18  05:15    


 


INR  1.23   11/27/18  05:15    


 


APTT  32.3 Seconds (25.1-36.5)   11/26/18  18:56    














- Additional Findings


Additional findings: 





- Constitutional


Appears: Non-toxic, Confused (incoherent speech, confused, not following 

commands), Chronically Ill





- Head Exam


Head Exam: ATRAUMATIC, NORMAL INSPECTION, NORMOCEPHALIC





- Eye Exam


Eye Exam: Normal appearance (Left eye normal appearance).  absent: Conjunctival 

injection, Scleral icterus


Pupil Exam: absent: Irregular, Unequal


Absent right eye, no discharge or swelling around R eye socket





- ENT Exam


ENT Exam: Mucous Membranes Moist





- Neck Exam


Neck exam: Positive for: Full Rom (passively, not following commands)





- Respiratory Exam


Respiratory Exam: Clear to Auscultation Bilateral, NORMAL BREATHING PATTERN 

(intermittent tachypnic episodes, usually preceding episode of agitation, but 

sats well during and not utilizing accessory muscles).  absent: Accessory Muscle

Use, Decreased Breath Sounds, Rales, Rhonchi, Wheezes





- Cardiovascular Exam


Cardiovascular Exam: Tachycardia (tachy to 100's), REGULAR RHYTHM, +S1, +S2.  

absent: Bradycardia, Irregular Rhythm, JVD, +S4





- GI/Abdominal Exam


GI & Abdominal Exam: Normal Bowel Sounds, Soft.  absent: Diminished Bowel S

ounds, Distended, Firm, Hyperactive Bowel Sounds, Hypoactive Bowel Sounds, Rigid





- Extremities Exam


Extremities exam: Positive for: normal capillary refill, pedal pulses present.  

Negative for: pedal edema





- Neurological Exam


altered, not following commands, attempting to speak but frequently incoherent 

or illogical, intermittently moving extremities spontaneously and trying to 

slide out of bed





- Psychiatric Exam


unable to assess, altered and incoherent, intermittently agitated





- Skin


Skin Exam: Dry, Intact, Normal Color, Warm





Assessment and Plan





- Assessment and Plan (Free Text)


Assessment: 





This is an 83 yo M with PMH of HTN, HLD, COPD, Hx SDH 2/2 fall trauma, CAD s/p 

stenting, Hypothyroidism, Hearing impairment of the right ear, and traumatic 

loss of right eye who [place] [facility] [place] several day history of altered 

mental status/agitation.  He is being worked up for septic vs neurologic cause 

of AMS, and is being evaluated for newly discovered AAA.


Plan: 





1) Progressively worsening AMS/Agitation


-more awake and active compared to arrival, but no appreciable change in last 2 

days


   As per wife, last episode like this took approx 6-8 weeks to resolve (when pt

had the subdural bleed)


-Etiology unclear, r/o septic vs acute neurologic etiology, Likely delirium 

component present


-Empirically on Vanco/Zosyn as per ID


-procal negative, blood and urine cx negative currently


-As per Neuro, likely toxic metabolic encephalopathy, MRI brain (still pending),

thiamine bid, BP control with goals 130's-140's/70's-80's


-As per Psych: ativan and geodon prn, risperidone qHS


-unable to take PO meds due to altered mental status, switching meds to IV 

formulation where feasible; will consider NGT for feeds and PO med resumption


-Aspiration precaution, head of bed to 30 degrees





2) AAA


-CT abdomen/pelvis concerning for for infrarenal abdominal aortic aneurysm measu

ring 5.4 cm (transverse) and mild aneurysmal dilatation of the distal abdominal 

aorta just above the bifurcation measuring 3.8 cm


-Aggressive BP control, goal is to maintain SBP < 140


-Pressure control with Vasotec q6, hydralazine PRN, clonidine 0.2mg patch; will 

resume PO cardiac meds after NGT placement


-IR consulted for possibility of endovascular repair, appreciate their input; 

not a candidate for repair, recs only conservative measures





3) Tachypnea


-RRT called for tachypnea, chronic issue for this patient, usually precedes an 

episode of agitation


-CXR this AM unremarkable, ABG unremarkable





Chronic issues:


-COPD: continue Duonebs, no wheezing so no need for steroids


-CAD s/p stenting: continue ASA, Lipitor


-Cataract surgery: continue eyedrops


-Hypothyroid: continue synthroid





Dispo: BP control, pending MRI; will need consideration for long term placement 

after workup for AMS completed and no acute treatable cause identified


Ppx: Protonix for GI, SCDs for DVT 





Reviewed and discussed with attending, Dr Tolentino





<Sebastian Tolentino S - Last Filed: 12/02/18 19:27>





Objective





- Vital Signs/Intake and Output


Vital Signs (last 24 hours): 


                                        











Temp Pulse Resp BP Pulse Ox


 


 102.5 F H  98 H  15   119/84   98 


 


 12/02/18 18:20  12/02/18 18:00  12/02/18 07:09  12/02/18 19:00  12/02/18 18:00








Intake and Output: 


                                        











 12/02/18 12/03/18





 18:59 06:59


 


Intake Total 570 


 


Balance 570 














- Medications


Medications: 


                               Current Medications





Acetaminophen (Tylenol 325mg Tab)  650 mg PO Q6H PRN


   PRN Reason: Fever >100.4 F


Acetaminophen (Tylenol 650 Mg Supp)  650 mg RC Q6H PRN


   PRN Reason: Fever >100.4 F


   Last Admin: 12/02/18 18:20 Dose:  650 mg


Albuterol/Ipratropium (Duoneb 3 Mg/0.5 Mg (3 Ml) Ud)  3 ml IH Q2H PRN


   PRN Reason: Shortness of Breath


   Last Admin: 12/01/18 23:46 Dose:  3 ml


Albuterol/Ipratropium (Duoneb 3 Mg/0.5 Mg (3 Ml) Ud)  3 ml IH [id number] [facility]


   Last Admin: 12/02/18 13:15 Dose:  3 ml


Artificial Tears (Artificial Tears Opht Oint)  1 gm OU Q6 [facility]


   Last Admin: 12/02/18 17:44 Dose:  1 applic


Aspirin (Ecotrin)  81 mg PO DAILY [facility]


   Last Admin: 12/02/18 14:01 Dose:  Not Given


Atorvastatin Calcium (Lipitor)  40 mg PO DAILY [facility]


   Last Admin: 12/02/18 14:03 Dose:  Not Given


Brimonidine Tartrate (Alphagan P 0.15% Opht)  1 drop OS Q8H [facility]


   Last Admin: 12/02/18 14:00 Dose:  1 drop


Cholecalciferol (Vitamin D)  2,000 intlu PO DAILY [facility]


   Last Admin: 12/02/18 14:05 Dose:  Not Given


Home Med (Home Med)  0 unit OS TID [facility]


   Last Admin: 12/02/18 17:41 Dose:  1 unit


Vancomycin HCl (Vancomycin 1gm)  1 gm in 250 mls @ 167 mls/hr IVPB Q12H [facility]; 

Protocol


   Last Admin: 12/02/18 14:09 Dose:  167 mls/hr


Dopamine HCl/Dextrose (Dopamine 400mg/250ml D5w)  400 mg in 250 mls @ 15.811 

mls/hr IV .[id number] PRN; Protocol


   PRN Reason: TITRATE PER MD ORDER


   Last Titration: 12/02/18 07:00 Dose:  0 mcg/kg/min, 0 mls/hr


NOREPINEPHRINE BIT/0.9 % NACL (Levophed 4 Mg/ 250 Ml Ns Premixed)  4 mg in 250 

mls @ 15 mls/hr IV .[id number] PRN; Protocol


   PRN Reason: TITRATE PER MD ORDER


   Last Admin: 12/02/18 16:29 Dose:  6 mcg/min, 22.5 mls/hr


Vasopressin 20 units/ Dextrose  101 mls @ 9.09 mls/hr IV .Q11H7M [facility]; Protocol


   Last Admin: 12/02/18 12:00 Dose:  9.09 mls/hr


Sodium Chloride (Sodium Chloride 0.45%)  1,000 mls @ 100 mls/hr IV .Q10H [facility]


   Last Admin: 12/02/18 12:30 Dose:  100 mls/hr


Dexmedetomidine HCl (Precedex 400mcg/100ml)  400 mcg in 100 mls @ 4.216 mls/hr 

IV .[id number] PRN; Protocol


   PRN Reason: Heart rate


   Last Titration: 12/02/18 05:19 Dose:  1 mcg/kg/hr, 21.081 mls/hr


Amiodarone HCl/Dextrose (Nexterone 360 Mg In D5w 200 Ml (Premix))  360 mg in 200

mls @ 16.667 mls/hr IV .Q12H [facility]; Protocol


   Last Admin: 12/02/18 14:30 Dose:  16.667 mls/hr


Meropenem/Sodium Chloride (Merrem Iv 500 Mg/Ns 50 Ml)  500 mg in 50 mls @ 100 

mls/hr IVPB Q8 [facility]; Protocol


Levothyroxine Sodium (Synthroid)  125 mcg PO DAILY [facility]


   Last Admin: 11/28/18 09:38 Dose:  Not Given


Levothyroxine Sodium (Synthroid)  60 mcg IVP DAILY [facility]


   Last Admin: 12/02/18 14:39 Dose:  60 mcg


Lorazepam (Ativan)  0.5 mg IVP Q6H [facility]; Protocol


   PRN Reason: Anxiety/Agitation


   Last Admin: 12/01/18 12:51 Dose:  0.5 mg


Nystatin (Nystop Topical Powder)  0 gm TOP Q8H [facility]


   Last Admin: 12/02/18 14:03 Dose:  1 applic


Pantoprazole Sodium (Protonix Inj)  40 mg IVP DAILY [facility]


   Last Admin: 12/02/18 14:10 Dose:  40 mg


Prednisolone Acetate (Pred Forte 1% Opht Susp)  0 ml OS DAILY [facility]


   Last Admin: 12/02/18 14:04 Dose:  1 drop


Risperidone (Risperdal Oral Soln)  0.5 mg PO HS [facility]; Protocol


   Last Admin: 12/01/18 22:46 Dose:  Not Given


Thiamine HCl (Vitamin B1 Tab)  100 mg PO BID [facility]


   Last Admin: 12/02/18 17:43 Dose:  Not Given


Ziprasidone (Geodon Inj)  10 mg IM Q12 PRN; Protocol


   PRN Reason: severe agitaiton/psychosis


   Last Admin: 11/30/18 21:54 Dose:  10 mg











- Labs


Labs: 


                                        





                                 12/02/18 01:00 





                                 12/02/18 01:00 





                                        











PT  14.2 SECONDS (9.4-12.5)  H  11/27/18  05:15    


 


INR  1.23   11/27/18  05:15    


 


APTT  32.3 Seconds (25.1-36.5)   11/26/18  18:56    














Assessment and Plan





- Assessment and Plan (Free Text)


Plan: 





Pt seen and examined.  I have reviewed the note of the medical resident and 

agree with it. I have reviewed the meds and labs of the pt. I have discussed the

assessment and plan with the resident. Pt is not as awake as yesterday. He had 

an episode of tachypnea. He was being treated with Abx and being followed by ID 

and Neuro. Prognosis is guarded. Follow vitals closely. Unable to take po. He 

may have aspirated but the staff is keeping the head of the bed at 30 degrees. I

saw the pt early in the morning. Dr Warner spoke to me and pt will be followed by

her for the weekend.

## 2018-12-02 LAB
ALBUMIN SERPL-MCNC: 3.2 G/DL (ref 3–4.8)
ALBUMIN/GLOB SERPL: 1.1 {RATIO} (ref 1.1–1.8)
ALT SERPL-CCNC: 149 U/L (ref 7–56)
APPEARANCE UR: (no result)
ARTERIAL BLOOD GAS O2 SAT: 100.3 % (ref 95–98)
ARTERIAL BLOOD GAS PCO2: 61 MM/HG (ref 35–45)
ARTERIAL BLOOD GAS TCO2: 24.2 MMOL.L (ref 22–28)
AST SERPL-CCNC: 151 U/L (ref 17–59)
BACTERIA #/AREA URNS HPF: (no result) /[HPF]
BASE EXCESS BLDV CALC-SCNC: -6.5 MMOL/L (ref 0–2)
BASOPHILS # BLD AUTO: 0.11 K/MM3 (ref 0–2)
BASOPHILS NFR BLD: 0.5 % (ref 0–3)
BILIRUB UR-MCNC: (no result) MG/DL
BUN SERPL-MCNC: 19 MG/DL (ref 7–21)
CALCIUM SERPL-MCNC: 8.6 MG/DL (ref 8.4–10.5)
COLOR UR: YELLOW
EOSINOPHIL # BLD: 0.1 10*3/UL (ref 0–0.7)
EOSINOPHIL NFR BLD: 0.5 % (ref 1.5–5)
EPI CELLS #/AREA URNS HPF: (no result) /HPF (ref 0–5)
ERYTHROCYTE [DISTWIDTH] IN BLOOD BY AUTOMATED COUNT: 14.5 % (ref 11.5–14.5)
GFR NON-AFRICAN AMERICAN: 58
GLUCOSE UR STRIP-MCNC: NEGATIVE MG/DL
GRANULOCYTES # BLD: 15.99 10*3/UL (ref 1.4–6.5)
GRANULOCYTES NFR BLD: 65.8 % (ref 50–68)
HCO3 BLDA-SCNC: 22.3 MMOL/L (ref 21–28)
HGB BLD-MCNC: 13 G/DL (ref 14–18)
INHALED O2 CONCENTRATION: 100 %
LEUKOCYTE ESTERASE UR-ACNC: NEGATIVE LEU/UL
LYMPHOCYTES # BLD: 6.6 10*3/UL (ref 1.2–3.4)
LYMPHOCYTES NFR BLD AUTO: 27.2 % (ref 22–35)
MCH RBC QN AUTO: 31.3 PG (ref 25–35)
MCHC RBC AUTO-ENTMCNC: 30.7 G/DL (ref 31–37)
MCV RBC AUTO: 101.9 FL (ref 80–105)
MONOCYTES # BLD AUTO: 1.5 10*3/UL (ref 0.1–0.6)
MONOCYTES NFR BLD: 6 % (ref 1–6)
PH BLDA: 7.17 [PH] (ref 7.35–7.45)
PH BLDV: 7.17 [PH] (ref 7.32–7.43)
PH UR STRIP: 6 [PH] (ref 4.7–8)
PLATELET # BLD: 274 10^3/UL (ref 120–450)
PMV BLD AUTO: 10.1 FL (ref 7–11)
PO2 BLDA: 471 MM/HG (ref 80–100)
PROT UR STRIP-MCNC: 30 MG/DL
RBC # BLD AUTO: 4.16 10^6/UL (ref 3.5–6.1)
RBC # UR STRIP: (no result) /UL
SP GR UR STRIP: >= 1.03 (ref 1–1.03)
TROPONIN I SERPL-MCNC: 0.12 NG/ML
UROBILINOGEN UR STRIP-ACNC: 0.2 E.U./DL
VENOUS BLOOD FIO2: 21 %
VENOUS BLOOD GAS PCO2: 63 (ref 40–60)
VENOUS BLOOD GAS PO2: 55 MM/HG (ref 30–55)
WBC # BLD AUTO: 24.3 10^3/UL (ref 4.5–11)
WBC #/AREA URNS HPF: (no result) /HPF (ref 0–6)

## 2018-12-02 PROCEDURE — 0BH18EZ INSERTION OF ENDOTRACHEAL AIRWAY INTO TRACHEA, VIA NATURAL OR ARTIFICIAL OPENING ENDOSCOPIC: ICD-10-PCS | Performed by: HOSPITALIST

## 2018-12-02 PROCEDURE — 06HY33Z INSERTION OF INFUSION DEVICE INTO LOWER VEIN, PERCUTANEOUS APPROACH: ICD-10-PCS | Performed by: HOSPITALIST

## 2018-12-02 PROCEDURE — 5A1945Z RESPIRATORY VENTILATION, 24-96 CONSECUTIVE HOURS: ICD-10-PCS | Performed by: HOSPITALIST

## 2018-12-02 PROCEDURE — B54BZZA ULTRASONOGRAPHY OF RIGHT LOWER EXTREMITY VEINS, GUIDANCE: ICD-10-PCS | Performed by: HOSPITALIST

## 2018-12-02 PROCEDURE — 04HY32Z INSERTION OF MONITORING DEVICE INTO LOWER ARTERY, PERCUTANEOUS APPROACH: ICD-10-PCS

## 2018-12-02 RX ADMIN — WHITE PETROLATUM MINERAL OIL SCH APPLIC: 150; 830 OINTMENT OPHTHALMIC at 14:01

## 2018-12-02 RX ADMIN — NYSTATIN SCH APPLIC: 100000 POWDER TOPICAL at 14:03

## 2018-12-02 RX ADMIN — VANCOMYCIN HYDROCHLORIDE SCH MLS/HR: 1 INJECTION, POWDER, LYOPHILIZED, FOR SOLUTION INTRAVENOUS at 14:09

## 2018-12-02 RX ADMIN — BRIMONIDINE TARTRATE SCH DROP: 1.5 SOLUTION/ DROPS OPHTHALMIC at 04:30

## 2018-12-02 RX ADMIN — BRIMONIDINE TARTRATE SCH DROP: 1.5 SOLUTION/ DROPS OPHTHALMIC at 22:16

## 2018-12-02 RX ADMIN — BRIMONIDINE TARTRATE SCH DROP: 1.5 SOLUTION/ DROPS OPHTHALMIC at 22:31

## 2018-12-02 RX ADMIN — WHITE PETROLATUM MINERAL OIL SCH APPLIC: 150; 830 OINTMENT OPHTHALMIC at 17:44

## 2018-12-02 RX ADMIN — AMIODARONE HYDROCHLORIDE SCH MLS/HR: 1.8 INJECTION, SOLUTION INTRAVENOUS at 14:30

## 2018-12-02 RX ADMIN — IPRATROPIUM BROMIDE AND ALBUTEROL SULFATE SCH ML: .5; 3 SOLUTION RESPIRATORY (INHALATION) at 13:15

## 2018-12-02 RX ADMIN — AMIODARONE HYDROCHLORIDE SCH: 1.8 INJECTION, SOLUTION INTRAVENOUS at 14:26

## 2018-12-02 RX ADMIN — PIPERACILLIN AND TAZOBACTAM SCH MLS/HR: 3; .375 INJECTION, POWDER, LYOPHILIZED, FOR SOLUTION INTRAVENOUS; PARENTERAL at 16:29

## 2018-12-02 RX ADMIN — Medication PRN MLS/HR: at 01:45

## 2018-12-02 RX ADMIN — Medication PRN MLS/HR: at 16:29

## 2018-12-02 RX ADMIN — VANCOMYCIN HYDROCHLORIDE SCH MLS/HR: 1 INJECTION, POWDER, LYOPHILIZED, FOR SOLUTION INTRAVENOUS at 01:46

## 2018-12-02 RX ADMIN — LEVOTHYROXINE SODIUM ANHYDROUS SCH MCG: 100 INJECTION, POWDER, LYOPHILIZED, FOR SOLUTION INTRAVENOUS at 14:39

## 2018-12-02 RX ADMIN — IPRATROPIUM BROMIDE AND ALBUTEROL SULFATE SCH ML: .5; 3 SOLUTION RESPIRATORY (INHALATION) at 08:42

## 2018-12-02 RX ADMIN — VASOPRESSIN SCH MLS/HR: 20 INJECTION, SOLUTION INTRAMUSCULAR; SUBCUTANEOUS at 01:58

## 2018-12-02 RX ADMIN — VITAMIN D, TAB 1000IU (100/BT) SCH: 25 TAB at 14:05

## 2018-12-02 RX ADMIN — MEROPENEM AND SODIUM CHLORIDE SCH MLS/HR: 500 INJECTION, SOLUTION INTRAVENOUS at 22:17

## 2018-12-02 RX ADMIN — IPRATROPIUM BROMIDE AND ALBUTEROL SULFATE SCH ML: .5; 3 SOLUTION RESPIRATORY (INHALATION) at 02:00

## 2018-12-02 RX ADMIN — AMIODARONE HYDROCHLORIDE SCH MLS/HR: 1.8 INJECTION, SOLUTION INTRAVENOUS at 08:12

## 2018-12-02 RX ADMIN — PREDNISOLONE ACETATE SCH DROP: 10 SUSPENSION/ DROPS OPHTHALMIC at 14:04

## 2018-12-02 RX ADMIN — VASOPRESSIN SCH MLS/HR: 20 INJECTION, SOLUTION INTRAMUSCULAR; SUBCUTANEOUS at 12:00

## 2018-12-02 RX ADMIN — INSULIN HUMAN SCH UNIT: 100 INJECTION, SOLUTION PARENTERAL at 20:27

## 2018-12-02 RX ADMIN — NYSTATIN SCH APPLIC: 100000 POWDER TOPICAL at 20:19

## 2018-12-02 RX ADMIN — PIPERACILLIN AND TAZOBACTAM SCH MLS/HR: 3; .375 INJECTION, POWDER, LYOPHILIZED, FOR SOLUTION INTRAVENOUS; PARENTERAL at 09:30

## 2018-12-02 RX ADMIN — NYSTATIN SCH APPLIC: 100000 POWDER TOPICAL at 04:15

## 2018-12-02 RX ADMIN — IPRATROPIUM BROMIDE AND ALBUTEROL SULFATE SCH ML: .5; 3 SOLUTION RESPIRATORY (INHALATION) at 19:29

## 2018-12-02 RX ADMIN — Medication PRN MLS/HR: at 09:15

## 2018-12-02 RX ADMIN — WHITE PETROLATUM MINERAL OIL SCH APPLIC: 150; 830 OINTMENT OPHTHALMIC at 22:59

## 2018-12-02 RX ADMIN — BRIMONIDINE TARTRATE SCH DROP: 1.5 SOLUTION/ DROPS OPHTHALMIC at 14:00

## 2018-12-02 NOTE — CP.CCUPN
<Dionte Courtney - Last Filed: 12/02/18 16:08>





CCU Subjective





- Physician Review


Subjective (Free Text): 





Dionte Courtney, PGY1 ICU Progress Note for Dr. Bond





Patient was seen at bedside this morning. Patient had code blue called x3 times 

overnight. He was intubated and had placement of femoral TLC access overnight. 

This morning, patient coded again; pulses were not intact. CPR was started 

immediately. ACLS protocol was followed. ROSC was obtained. Arterial line was 

placed for monitoring of blood pressure and pulse. After the code, patient was 

noted to be on amiodarone, vasopressin, and levophed drips. Family member was 

present later in the day. After in-length d/w patient's family and Dr. Bond, 

patient was made DNR. ROS unable to be obtained due to patient's critical 

condition. 








CCU Objective





- Vital Signs / Intake & Output


Vital Signs (Last 4 hours): 


Vital Signs











  Pulse BP Pulse Ox


 


 12/02/18 14:00  95 H  131/71  97


 


 12/02/18 13:00  98 H  135/75  96











Intake and Output (Last 8hrs): 


                                 Intake & Output











 12/02/18 12/02/18 12/02/18





 06:59 14:59 22:59


 


Intake Total 3450 385 


 


Output Total 800  


 


Balance 2650 385 


 


Intake:   


 


  IV 3450 385 


 


    Right Femoral 3400  


 


Output:   


 


  Urine 800  


 


    Straight 800  














- Physical Exam


Physical Exam Limitations: Positive for: Clinical Condition


Head: Positive for: Atraumatic, Normocephalic


Pupils: Positive for: Other (L-eye is opaque ).  Negative for: PERRL


Extroacular Muscles: Negative for: EOMI


Mouth: Positive for: Other (ET tube in place)


Upper Extremity: Negative for: Cyanosis, Edema


Lower Extremity: Positive for: NORMAL PULSES, Other (A-line and femoral TLC in 

place at the right groin).  Negative for: Edema


Neurological: Negative for: GCS=15


Skin: Positive for: Warm, Dry


Psychiatric: Negative for: Alert, Oriented x 3





- Medications


Active Medications: 


Active Medications











Generic Name Dose Route Start Last Admin





  Trade Name Freq  PRN Reason Stop Dose Admin


 


Acetaminophen  650 mg  11/26/18 21:12  





  Tylenol 325mg Tab  PO   





  Q6H PRN   





  Fever >100.4 F   





     





     





     


 


Acetaminophen  325 mg  11/26/18 21:17  12/02/18 05:14





  Tylenol 325 Mg Supp  RC   325 mg





  Q6H PRN   Administration





  Fever >100.4 F   





     





     





     


 


Albuterol/Ipratropium  3 ml  11/27/18 05:25  12/01/18 23:46





  Duoneb 3 Mg/0.5 Mg (3 Ml) Ud  IH   3 ml





  Q2H PRN   Administration





  Shortness of Breath   





     





     





     


 


Albuterol/Ipratropium  3 ml  11/27/18 08:00  12/02/18 13:15





  Duoneb 3 Mg/0.5 Mg (3 Ml) Ud  IH   3 ml





  [id number] LEONARDA   Administration





     





     





     





     


 


Artificial Tears  1 gm  12/02/18 12:00  12/02/18 14:01





  Artificial Tears Opht Oint  OU   1 applic





  Q6 LEONARDA   Administration





     





     





     





     


 


Aspirin  81 mg  11/27/18 10:00  12/02/18 14:01





  Ecotrin  PO   Not Given





  DAILY LEONARDA   





     





     





     





     


 


Atorvastatin Calcium  40 mg  11/28/18 14:54  12/02/18 14:03





  Lipitor  PO   Not Given





  DAILY LEONARDA   





     





     





     





     


 


Brimonidine Tartrate  1 drop  11/26/18 20:30  12/02/18 14:00





  Alphagan P 0.15% Opht  OS   1 drop





  Q8H LEONARDA   Administration





     





     





     





     


 


Cholecalciferol  2,000 intlu  11/27/18 10:00  12/02/18 14:05





  Vitamin D  PO   Not Given





  DAILY [facility]   





     





     





     





     


 


Home Med  0 unit  11/27/18 18:00  12/02/18 14:30





  Home Med  OS   1 unit





  TID LEONARDA   Administration





     





     





     





     


 


Vancomycin HCl  1 gm in 250 mls @ 167 mls/hr  11/27/18 13:15  12/02/18 14:09





  Vancomycin 1gm  IVPB   167 mls/hr





  Q12H LEONARDA   Administration





     





     





  Protocol   





     


 


Piperacillin Sod/Tazobactam Sod  100 mls @ 25 mls/hr  11/29/18 16:00  12/02/18 

09:30





  Zosyn 3.375 In Ns 100ml  IVPB   25 mls/hr





  Q8H LEONARDA   Administration





     





     





  Protocol   





     


 


Dopamine HCl/Dextrose  400 mg in 250 mls @ 15.811 mls/hr  12/02/18 00:57  

12/02/18 07:00





  Dopamine 400mg/250ml D5w  IV   0 mcg/kg/min





  .[id number] PRN   0 mls/hr





  TITRATE PER MD ORDER   Titration





     





  Protocol   





  5 MCG/KG/MIN   


 


NOREPINEPHRINE BIT/0.9 % NACL  4 mg in 250 mls @ 15 mls/hr  12/02/18 01:09  

12/02/18 09:33





  Levophed 4 Mg/ 250 Ml Ns Premixed  IV   18 mcg/min





  .[id number] PRN   67.5 mls/hr





  TITRATE PER MD ORDER   Titration





     





  Protocol   





  4 MCG/MIN   


 


Vasopressin 20 units/ Dextrose  101 mls @ 9.09 mls/hr  12/02/18 01:45  12/02/18 

12:00





  IV   9.09 mls/hr





  .[id number] LEONARDA   Administration





     





     





  Protocol   





  0.03 U/MIN   


 


Sodium Chloride  1,000 mls @ 100 mls/hr  12/02/18 02:30  12/02/18 12:30





  Sodium Chloride 0.45%  IV   100 mls/hr





  .Q10H LEONARDA   Administration





     





     





     





     


 


Dexmedetomidine HCl  400 mcg in 100 mls @ 4.216 mls/hr  12/02/18 02:50  12/02/18

05:19





  Precedex 400mcg/100ml  IV   1 mcg/kg/hr





  .[id number] PRN   21.081 mls/hr





  Heart rate   Titration





     





  Protocol   





  0.2 MCG/KG/HR   


 


Amiodarone HCl/Dextrose  360 mg in 200 mls @ 16.667 mls/hr  12/02/18 14:16  

12/02/18 14:30





  Nexterone 360 Mg In D5w 200 Ml (Premix)  IV   16.667 mls/hr





  .Q12H LEONARDA   Administration





     





     





  Protocol   





  0.5 MG/MIN   


 


Levothyroxine Sodium  125 mcg  11/27/18 10:00  11/28/18 09:38





  Synthroid  PO   Not Given





  DAILY LEONARDA   





     





     





     





     


 


Levothyroxine Sodium  60 mcg  11/29/18 10:00  12/02/18 14:39





  Synthroid  IVP   60 mcg





  DAILY LEONARDA   Administration





     





     





     





     


 


Lorazepam  0.5 mg  12/01/18 11:22  12/01/18 12:51





  Ativan  IVP   0.5 mg





  Q6H PRN   Administration





  Anxiety/Agitation   





     





  Protocol   





     


 


Nystatin  0 gm  11/28/18 04:15  12/02/18 14:03





  Nystop Topical Powder  TOP   1 applic





  Q8H LEONARDA   Administration





     





     





     





     


 


Pantoprazole Sodium  40 mg  11/27/18 10:00  12/02/18 14:10





  Protonix Inj  IVP   40 mg





  DAILY LEONARDA   Administration





     





     





     





     


 


Prednisolone Acetate  0 ml  11/27/18 16:00  12/02/18 14:04





  Pred Forte 1% Opht Susp  OS   1 drop





  DAILY LEONARDA   Administration





     





     





     





     


 


Risperidone  0.5 mg  11/26/18 22:00  12/01/18 22:46





  Risperdal Oral Soln  PO   Not Given





  HS LEONARDA   





     





     





  Protocol   





     


 


Thiamine HCl  100 mg  11/28/18 18:00  12/02/18 14:05





  Vitamin B1 Tab  PO   Not Given





  BID LEONARDA   





     





     





     





     


 


Ziprasidone  10 mg  11/28/18 09:25  11/30/18 21:54





  Geodon Inj  IM   10 mg





  Q12 PRN   Administration





  severe agitaiton/psychosis   





     





  Protocol   





     














- Patient Studies


Lab Studies: 


                              Microbiology Studies











 12/02/18 02:05 Gram Stain - Final





 Trachasp 


 


 11/26/18 21:29 Blood Culture - Final





 Blood-Venous    NO GROWTH AFTER 5 DAYS





 Gram Stain - Final





    TEST NOT PERFORMED


 


 11/26/18 21:29 Blood Culture - Final





 Blood-Venous    NO GROWTH AFTER 5 DAYS





 Gram Stain - Final





    TEST NOT PERFORMED








                                   Lab Studies











  12/02/18 12/02/18 12/02/18 Range/Units





  11:52 06:00 02:15 


 


WBC     (4.5-11.0)  10^3/uL


 


RBC     (3.5-6.1)  10^6/uL


 


Hgb     (14.0-18.0)  g/dL


 


Hct     (42.0-52.0)  %


 


MCV     (80.0-105.0)  fl


 


MCH     (25.0-35.0)  pg


 


MCHC     (31.0-37.0)  g/dl


 


RDW     (11.5-14.5)  %


 


Plt Count     (120.0-450.0)  10^3/uL


 


MPV     (7.0-11.0)  fl


 


Gran %     (50.0-68.0)  %


 


Lymph % (Auto)     (22.0-35.0)  %


 


Mono % (Auto)     (1.0-6.0)  %


 


Eos % (Auto)     (1.5-5.0)  %


 


Baso % (Auto)     (0.0-3.0)  %


 


Gran #     (1.4-6.5)  


 


Lymph # (Auto)     (1.2-3.4)  


 


Mono # (Auto)     (0.1-0.6)  


 


Eos # (Auto)     (0.0-0.7)  


 


Baso # (Auto)     (0.0-2.0)  K/mm3


 


pCO2    61 H  (35-45)  mm/Hg


 


pO2   55  471.0 H  ()  mm/Hg


 


HCO3    22.3  (21-28)  mmol/L


 


ABG pH    7.17 L*  (7.35-7.45)  


 


ABG Total CO2    24.2  (22-28)  mmol.L


 


ABG O2 Saturation    100.3 H  (95-98)  %


 


ABG O2 Content     (15-23)  ML/dl


 


ABG Base Excess    -7.1 L  (-2.0-3.0)  mmol/L


 


ABG Hemoglobin     (11.7-17.4)  g/dL


 


ABG Carboxyhemoglobin     (0.5-1.5)  %


 


POC ABG HHb (Measured)     (0-5)  %


 


ABG Methemoglobin     (0.0-3.0)  %


 


ABG O2 Capacity     (16-24)  mL/dl


 


ABG Potassium    3.4 L  (3.6-5.2)  mmol/L


 


VBG pH   7.17 L*   (7.32-7.43)  


 


VBG pCO2   63.0 H   (40-60)  


 


VBG HCO3   23.0   (21-28)  mmol/l


 


VBG Total CO2   24.9   (22-28)  mmol.L


 


VBG O2 Sat (Calc)   87.3 H   (40-65)  %


 


VBG Base Excess   -6.5 L   (0.0-2.0)  mmol/L


 


VBG Potassium   4.1   (3.6-5.2)  mmol/L


 


Hgb O2 Saturation     (95.0-98.0)  %


 


Glucose   188 H  158 H  ()  mg/dl


 


Lactate   2.0  3.1 H  (0.7-2.1)  mmol/L


 


FiO2   21.0  100.0  %


 


Sodium   154.0 H  155.0 H  (132-148)  mmol/L


 


Potassium     (3.6-5.0)  mmol/L


 


Chloride   118.0 H  121.0 H  ()  mmol/L


 


Carbon Dioxide     (21-33)  mmol/L


 


Anion Gap     (10-20)  


 


BUN     (7-21)  mg/dL


 


Creatinine     (0.8-1.5)  mg/dl


 


Est GFR (African Amer)     


 


Est GFR (Non-Af Amer)     


 


POC Glucose (mg/dL)  200 H    ()  mg/dL


 


Random Glucose     ()  mg/dL


 


Calcium     (8.4-10.5)  mg/dL


 


Phosphorus     (2.5-4.5)  mg/dL


 


Magnesium     (1.7-2.2)  mg/dL


 


Total Bilirubin     (0.2-1.3)  mg/dL


 


AST     (17-59)  U/L


 


ALT     (7-56)  U/L


 


Alkaline Phosphatase     ()  U/L


 


Troponin I     ng/mL


 


Total Protein     (5.8-8.3)  g/dL


 


Albumin     (3.0-4.8)  g/dL


 


Globulin     gm/dL


 


Albumin/Globulin Ratio     (1.1-1.8)  


 


TSH 3rd Generation     (0.46-4.68)  mIU/mL


 


Arterial Blood Potassium    3.4 L  (3.6-5.2)  mmol/L


 


Venous Blood Potassium   4.1   (3.6-5.2)  mmol/L


 


Urine Color     (YELLOW)  


 


Urine Appearance     (CLEAR)  


 


Urine pH     (4.7-8.0)  


 


Ur Specific Gravity     (1.005-1.035)  


 


Urine Protein     (<30 mg/dL)  mg/dL


 


Urine Glucose (UA)     (NEGATIVE)  mg/dL


 


Urine Ketones     (NEGATIVE)  mg/dL


 


Urine Blood     (NEGATIVE)  


 


Urine Nitrate     (NEGATIVE)  


 


Urine Bilirubin     (NEGATIVE)  


 


Urine Urobilinogen     (<1 E.U./dL)  E.U./dL


 


Ur Leukocyte Esterase     (NEGATIVE)  Suad/uL


 


Urine RBC     (0-2)  /hpf


 


Urine WBC     (0-6)  /hpf


 


Ur Epithelial Cells     (0-5)  /hpf


 


Urine Bacteria     (NEG)  














  12/02/18 12/02/18 12/02/18 Range/Units





  01:30 01:00 01:00 


 


WBC     (4.5-11.0)  10^3/uL


 


RBC     (3.5-6.1)  10^6/uL


 


Hgb     (14.0-18.0)  g/dL


 


Hct     (42.0-52.0)  %


 


MCV     (80.0-105.0)  fl


 


MCH     (25.0-35.0)  pg


 


MCHC     (31.0-37.0)  g/dl


 


RDW     (11.5-14.5)  %


 


Plt Count     (120.0-450.0)  10^3/uL


 


MPV     (7.0-11.0)  fl


 


Gran %     (50.0-68.0)  %


 


Lymph % (Auto)     (22.0-35.0)  %


 


Mono % (Auto)     (1.0-6.0)  %


 


Eos % (Auto)     (1.5-5.0)  %


 


Baso % (Auto)     (0.0-3.0)  %


 


Gran #     (1.4-6.5)  


 


Lymph # (Auto)     (1.2-3.4)  


 


Mono # (Auto)     (0.1-0.6)  


 


Eos # (Auto)     (0.0-0.7)  


 


Baso # (Auto)     (0.0-2.0)  K/mm3


 


pCO2     (35-45)  mm/Hg


 


pO2     ()  mm/Hg


 


HCO3     (21-28)  mmol/L


 


ABG pH     (7.35-7.45)  


 


ABG Total CO2     (22-28)  mmol.L


 


ABG O2 Saturation     (95-98)  %


 


ABG O2 Content     (15-23)  ML/dl


 


ABG Base Excess     (-2.0-3.0)  mmol/L


 


ABG Hemoglobin     (11.7-17.4)  g/dL


 


ABG Carboxyhemoglobin     (0.5-1.5)  %


 


POC ABG HHb (Measured)     (0-5)  %


 


ABG Methemoglobin     (0.0-3.0)  %


 


ABG O2 Capacity     (16-24)  mL/dl


 


ABG Potassium     (3.6-5.2)  mmol/L


 


VBG pH     (7.32-7.43)  


 


VBG pCO2     (40-60)  


 


VBG HCO3     (21-28)  mmol/l


 


VBG Total CO2     (22-28)  mmol.L


 


VBG O2 Sat (Calc)     (40-65)  %


 


VBG Base Excess     (0.0-2.0)  mmol/L


 


VBG Potassium     (3.6-5.2)  mmol/L


 


Hgb O2 Saturation     (95.0-98.0)  %


 


Glucose     ()  mg/dl


 


Lactate     (0.7-2.1)  mmol/L


 


FiO2     %


 


Sodium    154 H  (132-148)  mmol/L


 


Potassium    4.2  (3.6-5.0)  mmol/L


 


Chloride    122 H  ()  mmol/L


 


Carbon Dioxide    22  (21-33)  mmol/L


 


Anion Gap    14  (10-20)  


 


BUN    19  (7-21)  mg/dL


 


Creatinine    1.2  (0.8-1.5)  mg/dl


 


Est GFR (African Amer)    > 60  


 


Est GFR (Non-Af Amer)    58  


 


POC Glucose (mg/dL)     ()  mg/dL


 


Random Glucose    111 H  ()  mg/dL


 


Calcium    8.6  (8.4-10.5)  mg/dL


 


Phosphorus   5.1 H   (2.5-4.5)  mg/dL


 


Magnesium   2.5 H   (1.7-2.2)  mg/dL


 


Total Bilirubin    1.3  (0.2-1.3)  mg/dL


 


AST    151 H D  (17-59)  U/L


 


ALT    149 H  (7-56)  U/L


 


Alkaline Phosphatase    78  ()  U/L


 


Troponin I   0.12  D   ng/mL


 


Total Protein    6.2  (5.8-8.3)  g/dL


 


Albumin    3.2  (3.0-4.8)  g/dL


 


Globulin    2.9  gm/dL


 


Albumin/Globulin Ratio    1.1  (1.1-1.8)  


 


TSH 3rd Generation     (0.46-4.68)  mIU/mL


 


Arterial Blood Potassium     (3.6-5.2)  mmol/L


 


Venous Blood Potassium     (3.6-5.2)  mmol/L


 


Urine Color  Yellow    (YELLOW)  


 


Urine Appearance  Sl cloudy    (CLEAR)  


 


Urine pH  6.0    (4.7-8.0)  


 


Ur Specific Gravity  >= 1.030    (1.005-1.035)  


 


Urine Protein  30 H    (<30 mg/dL)  mg/dL


 


Urine Glucose (UA)  Negative    (NEGATIVE)  mg/dL


 


Urine Ketones  15 H    (NEGATIVE)  mg/dL


 


Urine Blood  Moderate H    (NEGATIVE)  


 


Urine Nitrate  Negative    (NEGATIVE)  


 


Urine Bilirubin  Small H    (NEGATIVE)  


 


Urine Urobilinogen  0.2    (<1 E.U./dL)  E.U./dL


 


Ur Leukocyte Esterase  Negative    (NEGATIVE)  Suad/uL


 


Urine RBC  5 - 10    (0-2)  /hpf


 


Urine WBC  0 - 2    (0-6)  /hpf


 


Ur Epithelial Cells  0 - 2    (0-5)  /hpf


 


Urine Bacteria  Few    (NEG)  














  12/02/18 12/01/18 12/01/18 Range/Units





  01:00 23:52 18:25 


 


WBC  24.3 H D    (4.5-11.0)  10^3/uL


 


RBC  4.16    (3.5-6.1)  10^6/uL


 


Hgb  13.0 L    (14.0-18.0)  g/dL


 


Hct  42.4    (42.0-52.0)  %


 


MCV  101.9  D    (80.0-105.0)  fl


 


MCH  31.3    (25.0-35.0)  pg


 


MCHC  30.7 L    (31.0-37.0)  g/dl


 


RDW  14.5    (11.5-14.5)  %


 


Plt Count  274    (120.0-450.0)  10^3/uL


 


MPV  10.1    (7.0-11.0)  fl


 


Gran %  65.8    (50.0-68.0)  %


 


Lymph % (Auto)  27.2    (22.0-35.0)  %


 


Mono % (Auto)  6.0    (1.0-6.0)  %


 


Eos % (Auto)  0.5 L    (1.5-5.0)  %


 


Baso % (Auto)  0.5    (0.0-3.0)  %


 


Gran #  15.99 H    (1.4-6.5)  


 


Lymph # (Auto)  6.6 H    (1.2-3.4)  


 


Mono # (Auto)  1.5 H    (0.1-0.6)  


 


Eos # (Auto)  0.1    (0.0-0.7)  


 


Baso # (Auto)  0.11    (0.0-2.0)  K/mm3


 


pCO2    36  (35-45)  mm/Hg


 


pO2    139.0 H  ()  mm/Hg


 


HCO3    22.3  (21-28)  mmol/L


 


ABG pH    7.40  (7.35-7.45)  


 


ABG Total CO2    23.4  (22-28)  mmol.L


 


ABG O2 Saturation    99.3 H  (95-98)  %


 


ABG O2 Content    18.0  (15-23)  ML/dl


 


ABG Base Excess    -2.0  (-2.0-3.0)  mmol/L


 


ABG Hemoglobin    13.1  (11.7-17.4)  g/dL


 


ABG Carboxyhemoglobin    1.8 H  (0.5-1.5)  %


 


POC ABG HHb (Measured)    0.7  (0-5)  %


 


ABG Methemoglobin    0.9  (0.0-3.0)  %


 


ABG O2 Capacity    18.1  (16-24)  mL/dl


 


ABG Potassium     (3.6-5.2)  mmol/L


 


VBG pH     (7.32-7.43)  


 


VBG pCO2     (40-60)  


 


VBG HCO3     (21-28)  mmol/l


 


VBG Total CO2     (22-28)  mmol.L


 


VBG O2 Sat (Calc)     (40-65)  %


 


VBG Base Excess     (0.0-2.0)  mmol/L


 


VBG Potassium     (3.6-5.2)  mmol/L


 


Hgb O2 Saturation    96.6  (95.0-98.0)  %


 


Glucose     ()  mg/dl


 


Lactate     (0.7-2.1)  mmol/L


 


FiO2    40.0  %


 


Sodium     (132-148)  mmol/L


 


Potassium     (3.6-5.0)  mmol/L


 


Chloride     ()  mmol/L


 


Carbon Dioxide     (21-33)  mmol/L


 


Anion Gap     (10-20)  


 


BUN     (7-21)  mg/dL


 


Creatinine     (0.8-1.5)  mg/dl


 


Est GFR (African Amer)     


 


Est GFR (Non-Af Amer)     


 


POC Glucose (mg/dL)   110   ()  mg/dL


 


Random Glucose     ()  mg/dL


 


Calcium     (8.4-10.5)  mg/dL


 


Phosphorus     (2.5-4.5)  mg/dL


 


Magnesium     (1.7-2.2)  mg/dL


 


Total Bilirubin     (0.2-1.3)  mg/dL


 


AST     (17-59)  U/L


 


ALT     (7-56)  U/L


 


Alkaline Phosphatase     ()  U/L


 


Troponin I     ng/mL


 


Total Protein     (5.8-8.3)  g/dL


 


Albumin     (3.0-4.8)  g/dL


 


Globulin     gm/dL


 


Albumin/Globulin Ratio     (1.1-1.8)  


 


TSH 3rd Generation     (0.46-4.68)  mIU/mL


 


Arterial Blood Potassium     (3.6-5.2)  mmol/L


 


Venous Blood Potassium     (3.6-5.2)  mmol/L


 


Urine Color     (YELLOW)  


 


Urine Appearance     (CLEAR)  


 


Urine pH     (4.7-8.0)  


 


Ur Specific Gravity     (1.005-1.035)  


 


Urine Protein     (<30 mg/dL)  mg/dL


 


Urine Glucose (UA)     (NEGATIVE)  mg/dL


 


Urine Ketones     (NEGATIVE)  mg/dL


 


Urine Blood     (NEGATIVE)  


 


Urine Nitrate     (NEGATIVE)  


 


Urine Bilirubin     (NEGATIVE)  


 


Urine Urobilinogen     (<1 E.U./dL)  E.U./dL


 


Ur Leukocyte Esterase     (NEGATIVE)  Suad/uL


 


Urine RBC     (0-2)  /hpf


 


Urine WBC     (0-6)  /hpf


 


Ur Epithelial Cells     (0-5)  /hpf


 


Urine Bacteria     (NEG)  














  12/01/18 Range/Units





  06:00 


 


WBC   (4.5-11.0)  10^3/uL


 


RBC   (3.5-6.1)  10^6/uL


 


Hgb   (14.0-18.0)  g/dL


 


Hct   (42.0-52.0)  %


 


MCV   (80.0-105.0)  fl


 


MCH   (25.0-35.0)  pg


 


MCHC   (31.0-37.0)  g/dl


 


RDW   (11.5-14.5)  %


 


Plt Count   (120.0-450.0)  10^3/uL


 


MPV   (7.0-11.0)  fl


 


Gran %   (50.0-68.0)  %


 


Lymph % (Auto)   (22.0-35.0)  %


 


Mono % (Auto)   (1.0-6.0)  %


 


Eos % (Auto)   (1.5-5.0)  %


 


Baso % (Auto)   (0.0-3.0)  %


 


Gran #   (1.4-6.5)  


 


Lymph # (Auto)   (1.2-3.4)  


 


Mono # (Auto)   (0.1-0.6)  


 


Eos # (Auto)   (0.0-0.7)  


 


Baso # (Auto)   (0.0-2.0)  K/mm3


 


pCO2   (35-45)  mm/Hg


 


pO2   ()  mm/Hg


 


HCO3   (21-28)  mmol/L


 


ABG pH   (7.35-7.45)  


 


ABG Total CO2   (22-28)  mmol.L


 


ABG O2 Saturation   (95-98)  %


 


ABG O2 Content   (15-23)  ML/dl


 


ABG Base Excess   (-2.0-3.0)  mmol/L


 


ABG Hemoglobin   (11.7-17.4)  g/dL


 


ABG Carboxyhemoglobin   (0.5-1.5)  %


 


POC ABG HHb (Measured)   (0-5)  %


 


ABG Methemoglobin   (0.0-3.0)  %


 


ABG O2 Capacity   (16-24)  mL/dl


 


ABG Potassium   (3.6-5.2)  mmol/L


 


VBG pH   (7.32-7.43)  


 


VBG pCO2   (40-60)  


 


VBG HCO3   (21-28)  mmol/l


 


VBG Total CO2   (22-28)  mmol.L


 


VBG O2 Sat (Calc)   (40-65)  %


 


VBG Base Excess   (0.0-2.0)  mmol/L


 


VBG Potassium   (3.6-5.2)  mmol/L


 


Hgb O2 Saturation   (95.0-98.0)  %


 


Glucose   ()  mg/dl


 


Lactate   (0.7-2.1)  mmol/L


 


FiO2   %


 


Sodium   (132-148)  mmol/L


 


Potassium   (3.6-5.0)  mmol/L


 


Chloride   ()  mmol/L


 


Carbon Dioxide   (21-33)  mmol/L


 


Anion Gap   (10-20)  


 


BUN   (7-21)  mg/dL


 


Creatinine   (0.8-1.5)  mg/dl


 


Est GFR (African Amer)   


 


Est GFR (Non-Af Amer)   


 


POC Glucose (mg/dL)   ()  mg/dL


 


Random Glucose   ()  mg/dL


 


Calcium   (8.4-10.5)  mg/dL


 


Phosphorus   (2.5-4.5)  mg/dL


 


Magnesium   (1.7-2.2)  mg/dL


 


Total Bilirubin   (0.2-1.3)  mg/dL


 


AST   (17-59)  U/L


 


ALT   (7-56)  U/L


 


Alkaline Phosphatase   ()  U/L


 


Troponin I   ng/mL


 


Total Protein   (5.8-8.3)  g/dL


 


Albumin   (3.0-4.8)  g/dL


 


Globulin   gm/dL


 


Albumin/Globulin Ratio   (1.1-1.8)  


 


TSH 3rd Generation  3.80  (0.46-4.68)  mIU/mL


 


Arterial Blood Potassium   (3.6-5.2)  mmol/L


 


Venous Blood Potassium   (3.6-5.2)  mmol/L


 


Urine Color   (YELLOW)  


 


Urine Appearance   (CLEAR)  


 


Urine pH   (4.7-8.0)  


 


Ur Specific Gravity   (1.005-1.035)  


 


Urine Protein   (<30 mg/dL)  mg/dL


 


Urine Glucose (UA)   (NEGATIVE)  mg/dL


 


Urine Ketones   (NEGATIVE)  mg/dL


 


Urine Blood   (NEGATIVE)  


 


Urine Nitrate   (NEGATIVE)  


 


Urine Bilirubin   (NEGATIVE)  


 


Urine Urobilinogen   (<1 E.U./dL)  E.U./dL


 


Ur Leukocyte Esterase   (NEGATIVE)  Suad/uL


 


Urine RBC   (0-2)  /hpf


 


Urine WBC   (0-6)  /hpf


 


Ur Epithelial Cells   (0-5)  /hpf


 


Urine Bacteria   (NEG)  








                         Laboratory Results - last 24 hr











  12/01/18 12/01/18 12/01/18





  06:00 18:25 23:52


 


WBC   


 


RBC   


 


Hgb   


 


Hct   


 


MCV   


 


MCH   


 


MCHC   


 


RDW   


 


Plt Count   


 


MPV   


 


Gran %   


 


Lymph % (Auto)   


 


Mono % (Auto)   


 


Eos % (Auto)   


 


Baso % (Auto)   


 


Gran #   


 


Lymph # (Auto)   


 


Mono # (Auto)   


 


Eos # (Auto)   


 


Baso # (Auto)   


 


pCO2   36 


 


pO2   139.0 H 


 


HCO3   22.3 


 


ABG pH   7.40 


 


ABG Total CO2   23.4 


 


ABG O2 Saturation   99.3 H 


 


ABG O2 Content   18.0 


 


ABG Base Excess   -2.0 


 


ABG Hemoglobin   13.1 


 


ABG Carboxyhemoglobin   1.8 H 


 


POC ABG HHb (Measured)   0.7 


 


ABG Methemoglobin   0.9 


 


ABG O2 Capacity   18.1 


 


ABG Potassium   


 


VBG pH   


 


VBG pCO2   


 


VBG HCO3   


 


VBG Total CO2   


 


VBG O2 Sat (Calc)   


 


VBG Base Excess   


 


VBG Potassium   


 


Hgb O2 Saturation   96.6 


 


Glucose   


 


Lactate   


 


FiO2   40.0 


 


Sodium   


 


Potassium   


 


Chloride   


 


Carbon Dioxide   


 


Anion Gap   


 


BUN   


 


Creatinine   


 


Est GFR ( Amer)   


 


Est GFR (Non-Af Amer)   


 


POC Glucose (mg/dL)    110


 


Random Glucose   


 


Calcium   


 


Phosphorus   


 


Magnesium   


 


Total Bilirubin   


 


AST   


 


ALT   


 


Alkaline Phosphatase   


 


Troponin I   


 


Total Protein   


 


Albumin   


 


Globulin   


 


Albumin/Globulin Ratio   


 


TSH 3rd Generation  3.80  


 


Arterial Blood Potassium   


 


Venous Blood Potassium   


 


Urine Color   


 


Urine Appearance   


 


Urine pH   


 


Ur Specific Gravity   


 


Urine Protein   


 


Urine Glucose (UA)   


 


Urine Ketones   


 


Urine Blood   


 


Urine Nitrate   


 


Urine Bilirubin   


 


Urine Urobilinogen   


 


Ur Leukocyte Esterase   


 


Urine RBC   


 


Urine WBC   


 


Ur Epithelial Cells   


 


Urine Bacteria   














  12/02/18 12/02/18 12/02/18





  01:00 01:00 01:00


 


WBC  24.3 H D  


 


RBC  4.16  


 


Hgb  13.0 L  


 


Hct  42.4  


 


MCV  101.9  D  


 


MCH  31.3  


 


MCHC  30.7 L  


 


RDW  14.5  


 


Plt Count  274  


 


MPV  10.1  


 


Gran %  65.8  


 


Lymph % (Auto)  27.2  


 


Mono % (Auto)  6.0  


 


Eos % (Auto)  0.5 L  


 


Baso % (Auto)  0.5  


 


Gran #  15.99 H  


 


Lymph # (Auto)  6.6 H  


 


Mono # (Auto)  1.5 H  


 


Eos # (Auto)  0.1  


 


Baso # (Auto)  0.11  


 


pCO2   


 


pO2   


 


HCO3   


 


ABG pH   


 


ABG Total CO2   


 


ABG O2 Saturation   


 


ABG O2 Content   


 


ABG Base Excess   


 


ABG Hemoglobin   


 


ABG Carboxyhemoglobin   


 


POC ABG HHb (Measured)   


 


ABG Methemoglobin   


 


ABG O2 Capacity   


 


ABG Potassium   


 


VBG pH   


 


VBG pCO2   


 


VBG HCO3   


 


VBG Total CO2   


 


VBG O2 Sat (Calc)   


 


VBG Base Excess   


 


VBG Potassium   


 


Hgb O2 Saturation   


 


Glucose   


 


Lactate   


 


FiO2   


 


Sodium   154 H 


 


Potassium   4.2 


 


Chloride   122 H 


 


Carbon Dioxide   22 


 


Anion Gap   14 


 


BUN   19 


 


Creatinine   1.2 


 


Est GFR ( Amer)   > 60 


 


Est GFR (Non-Af Amer)   58 


 


POC Glucose (mg/dL)   


 


Random Glucose   111 H 


 


Calcium   8.6 


 


Phosphorus    5.1 H


 


Magnesium    2.5 H


 


Total Bilirubin   1.3 


 


AST   151 H D 


 


ALT   149 H 


 


Alkaline Phosphatase   78 


 


Troponin I    0.12  D


 


Total Protein   6.2 


 


Albumin   3.2 


 


Globulin   2.9 


 


Albumin/Globulin Ratio   1.1 


 


TSH 3rd Generation   


 


Arterial Blood Potassium   


 


Venous Blood Potassium   


 


Urine Color   


 


Urine Appearance   


 


Urine pH   


 


Ur Specific Gravity   


 


Urine Protein   


 


Urine Glucose (UA)   


 


Urine Ketones   


 


Urine Blood   


 


Urine Nitrate   


 


Urine Bilirubin   


 


Urine Urobilinogen   


 


Ur Leukocyte Esterase   


 


Urine RBC   


 


Urine WBC   


 


Ur Epithelial Cells   


 


Urine Bacteria   














  12/02/18 12/02/18 12/02/18





  01:30 02:15 06:00


 


WBC   


 


RBC   


 


Hgb   


 


Hct   


 


MCV   


 


MCH   


 


MCHC   


 


RDW   


 


Plt Count   


 


MPV   


 


Gran %   


 


Lymph % (Auto)   


 


Mono % (Auto)   


 


Eos % (Auto)   


 


Baso % (Auto)   


 


Gran #   


 


Lymph # (Auto)   


 


Mono # (Auto)   


 


Eos # (Auto)   


 


Baso # (Auto)   


 


pCO2   61 H 


 


pO2   471.0 H  55


 


HCO3   22.3 


 


ABG pH   7.17 L* 


 


ABG Total CO2   24.2 


 


ABG O2 Saturation   100.3 H 


 


ABG O2 Content   


 


ABG Base Excess   -7.1 L 


 


ABG Hemoglobin   


 


ABG Carboxyhemoglobin   


 


POC ABG HHb (Measured)   


 


ABG Methemoglobin   


 


ABG O2 Capacity   


 


ABG Potassium   3.4 L 


 


VBG pH    7.17 L*


 


VBG pCO2    63.0 H


 


VBG HCO3    23.0


 


VBG Total CO2    24.9


 


VBG O2 Sat (Calc)    87.3 H


 


VBG Base Excess    -6.5 L


 


VBG Potassium    4.1


 


Hgb O2 Saturation   


 


Glucose   158 H  188 H


 


Lactate   3.1 H  2.0


 


FiO2   100.0  21.0


 


Sodium   155.0 H  154.0 H


 


Potassium   


 


Chloride   121.0 H  118.0 H


 


Carbon Dioxide   


 


Anion Gap   


 


BUN   


 


Creatinine   


 


Est GFR ( Amer)   


 


Est GFR (Non-Af Amer)   


 


POC Glucose (mg/dL)   


 


Random Glucose   


 


Calcium   


 


Phosphorus   


 


Magnesium   


 


Total Bilirubin   


 


AST   


 


ALT   


 


Alkaline Phosphatase   


 


Troponin I   


 


Total Protein   


 


Albumin   


 


Globulin   


 


Albumin/Globulin Ratio   


 


TSH 3rd Generation   


 


Arterial Blood Potassium   3.4 L 


 


Venous Blood Potassium    4.1


 


Urine Color  Yellow  


 


Urine Appearance  Sl cloudy  


 


Urine pH  6.0  


 


Ur Specific Gravity  >= 1.030  


 


Urine Protein  30 H  


 


Urine Glucose (UA)  Negative  


 


Urine Ketones  15 H  


 


Urine Blood  Moderate H  


 


Urine Nitrate  Negative  


 


Urine Bilirubin  Small H  


 


Urine Urobilinogen  0.2  


 


Ur Leukocyte Esterase  Negative  


 


Urine RBC  5 - 10  


 


Urine WBC  0 - 2  


 


Ur Epithelial Cells  0 - 2  


 


Urine Bacteria  Few  














  12/02/18





  11:52


 


WBC 


 


RBC 


 


Hgb 


 


Hct 


 


MCV 


 


MCH 


 


MCHC 


 


RDW 


 


Plt Count 


 


MPV 


 


Gran % 


 


Lymph % (Auto) 


 


Mono % (Auto) 


 


Eos % (Auto) 


 


Baso % (Auto) 


 


Gran # 


 


Lymph # (Auto) 


 


Mono # (Auto) 


 


Eos # (Auto) 


 


Baso # (Auto) 


 


pCO2 


 


pO2 


 


HCO3 


 


ABG pH 


 


ABG Total CO2 


 


ABG O2 Saturation 


 


ABG O2 Content 


 


ABG Base Excess 


 


ABG Hemoglobin 


 


ABG Carboxyhemoglobin 


 


POC ABG HHb (Measured) 


 


ABG Methemoglobin 


 


ABG O2 Capacity 


 


ABG Potassium 


 


VBG pH 


 


VBG pCO2 


 


VBG HCO3 


 


VBG Total CO2 


 


VBG O2 Sat (Calc) 


 


VBG Base Excess 


 


VBG Potassium 


 


Hgb O2 Saturation 


 


Glucose 


 


Lactate 


 


FiO2 


 


Sodium 


 


Potassium 


 


Chloride 


 


Carbon Dioxide 


 


Anion Gap 


 


BUN 


 


Creatinine 


 


Est GFR ( Amer) 


 


Est GFR (Non-Af Amer) 


 


POC Glucose (mg/dL)  200 H


 


Random Glucose 


 


Calcium 


 


Phosphorus 


 


Magnesium 


 


Total Bilirubin 


 


AST 


 


ALT 


 


Alkaline Phosphatase 


 


Troponin I 


 


Total Protein 


 


Albumin 


 


Globulin 


 


Albumin/Globulin Ratio 


 


TSH 3rd Generation 


 


Arterial Blood Potassium 


 


Venous Blood Potassium 


 


Urine Color 


 


Urine Appearance 


 


Urine pH 


 


Ur Specific Gravity 


 


Urine Protein 


 


Urine Glucose (UA) 


 


Urine Ketones 


 


Urine Blood 


 


Urine Nitrate 


 


Urine Bilirubin 


 


Urine Urobilinogen 


 


Ur Leukocyte Esterase 


 


Urine RBC 


 


Urine WBC 


 


Ur Epithelial Cells 


 


Urine Bacteria 











EKG/Cardiology Studies: 


Cardiology / EKG Studies





12/02/18 02:11


EKG [ELECTROCARDIOGRAM] Stat 


   Comment: 


   Reason For Exam: Cardiopulmonary arrest











Fingerstick Blood Sugar Results: 110





Review of Systems





- Review of Systems


Systems not reviewed;Unavailable: Acuity of Condition





Critical Care Progress Note





- Ventilator Checklist


Head of Bed 30 Degrees: Yes


Daily Sedation Vacation: Yes


Daily Assessment of Readiness to Wean: Yes


Daily Spontaneous Breathing Trial: Yes


PUD Prophalyxis: Yes


DVT Prophylaxis: Yes


Oral Care with Chlorhexidine Gluconate {CHG}: Yes





- Vent Settings


MODE:: PRVC


TIDAL VOLUME:: 450


RESP RATE:: 20


FIO2:: 50


PEEP:: 5





- Extremities/Vascular


Does the Patient have a Central Venous Catheter?: Yes


Insertion Site: Femoral Vein


Does the Patient need a Central Venous Catheter?: Yes


Does the Patient have a Owen Catheter?: Yes


Does the Patient need a Owen Catheter?: Yes


Catheter Insertion Criteria: Need for accurate measurement of output in 

critically ill patient





- Restraints


Justification for Restraints: High risk for self extubation





- Prophylaxis GI


Prophylaxis GI: PPI





- Prophylaxis DVT


Prophylaxis DVT: SCDs





- Nutrition


Nutrition: 


                                    Nutrition











 Category Date Time Status


 


 NPO Diet [DIET] Diets  12/02/18 Breakfast Ordered














Assessment/Plan





- Assessment and Plan (Free Text)


Assessment: 





Patient is a 82 year old male with PMHx of HTN, COPD, hx of small bilateral 

subdural hematoma secondary to trauma from fall without neurosurgical 

intervention(4/2018), hypothyroidism, HLD, CAD s/p cardiac stent 2003 who 

[place] [facility] [place] history of altered mental status/agitation. 

During hospital course, patient had multiple episodes of cardiopulmonary arrest 

while he was getting NG tube placement. ACLS protocol was initiated and patient 

was brought to the ICU. Patient coded again in the ICU. ROSC was obtained. 

Family members made patient DNR. Overall prognosis is poor. 


Plan: 





Neurology: 


- Not alert, awake, or following commands. Patient is s/p x4 cardiopulmonary 

arrests 


- Head CT: no acute intracranial abnormalities


- Neurology consulted, follow up recs 


- ID Consulted, follow up recs 





Cardio:


- s/p Code Blue in the ICU, ROSC was achieved. ACLS protocol was followed 

appropriately. Patient made DNR via family. 


- Arterial line was placed in the right femoral artery 


- TLC femoral line in place


- c/w levophed, dopamine, vasopressin for blood pressure control


- Maintain MAP >65


- Hx CAD, HTN, HLD, Infrarenal AAA 5.4cm 


- Echocardiogram (5/2018): EF 55-60%, mild AS, Aortic root is borderline 

enlarged, no vegetation or thrombus noted





Pulm:


- intubated on the ventilator. ET tube in place. PRVC vent settings. 


- CXR: appropriate lines are in place. 


- c/w daily abg and daily CXR 


- COPD, hx Emphysema





GI: 


- Transaminitis: 151/149; trending upwards


- Abd/Pelvis CT without IV contrast showing hepatomegaly, multiple low-

attenuation lesions in the renal cortex likely representing benign cysts, large 

hiatus hernia, infrarenal abdominal aortic aneurysm measuring 5.4cm


- Protonix for GI ppx 


- NPO diet except meds





Renal:


- Hypernatremia


- Monitor electrolytes





ID:


- Septic Shock; c/w pressors. 


- Leukocytosis trending upwards. Patient had Tmax 101.4  


- f/u septic workup 


- antibiotics as per ID


- ID consulted, appreciated recs





Heme:


- DVT ppx 


- H/H stable at this time 





Code Status: DNR 





Dispo: monitor patient in the ICU. Patient has poor prognosis. DNR. 





Case was discussed and reviewed with Attending Physician, Dr. Bond











<Vincent Bond - Last Filed: 12/02/18 18:04>





CCU Objective





- Vital Signs / Intake & Output


Vital Signs (Last 4 hours): 


Vital Signs











  Pulse BP Pulse Ox


 


 12/02/18 17:00  91 H  121/70  96


 


 12/02/18 16:00  92 H  117/69  98


 


 12/02/18 15:00  93 H  122/71  98











Intake and Output (Last 8hrs): 


                                 Intake & Output











 12/02/18 12/02/18 12/02/18





 06:59 14:59 22:59


 


Intake Total 3450 540 30


 


Output Total 800  


 


Balance 2650 540 30


 


Intake:   


 


  IV 3450 540 30


 


    Right Femoral 3400  


 


Output:   


 


  Urine 800  


 


    Straight 800  














- Medications


Active Medications: 


Active Medications











Generic Name Dose Route Start Last Admin





  Trade Name Freq  PRN Reason Stop Dose Admin


 


Acetaminophen  650 mg  11/26/18 21:12  





  Tylenol 325mg Tab  PO   





  Q6H PRN   





  Fever >100.4 F   





     





     





     


 


Acetaminophen  325 mg  11/26/18 21:17  12/02/18 05:14





  Tylenol 325 Mg Supp  RC   325 mg





  Q6H PRN   Administration





  Fever >100.4 F   





     





     





     


 


Albuterol/Ipratropium  3 ml  11/27/18 05:25  12/01/18 23:46





  Duoneb 3 Mg/0.5 Mg (3 Ml) Ud  IH   3 ml





  Q2H PRN   Administration





  Shortness of Breath   





     





     





     


 


Albuterol/Ipratropium  3 ml  11/27/18 08:00  12/02/18 13:15





  Duoneb 3 Mg/0.5 Mg (3 Ml) Ud  IH   3 ml





  [id number] LEONARDA   Administration





     





     





     





     


 


Artificial Tears  1 gm  12/02/18 12:00  12/02/18 17:44





  Artificial Tears Opht Oint  OU   1 applic





  Q6 LEONARDA   Administration





     





     





     





     


 


Aspirin  81 mg  11/27/18 10:00  12/02/18 14:01





  Ecotrin  PO   Not Given





  DAILY LEONARDA   





     





     





     





     


 


Atorvastatin Calcium  40 mg  11/28/18 14:54  12/02/18 14:03





  Lipitor  PO   Not Given





  DAILY LEONARDA   





     





     





     





     


 


Brimonidine Tartrate  1 drop  11/26/18 20:30  12/02/18 14:00





  Alphagan P 0.15% Opht  OS   1 drop





  Q8H LEONARDA   Administration





     





     





     





     


 


Cholecalciferol  2,000 intlu  11/27/18 10:00  12/02/18 14:05





  Vitamin D  PO   Not Given





  DAILY LEONARDA   





     





     





     





     


 


Home Med  0 unit  11/27/18 18:00  12/02/18 17:41





  Home Med  OS   1 unit





  TID LEONARDA   Administration





     





     





     





     


 


Vancomycin HCl  1 gm in 250 mls @ 167 mls/hr  11/27/18 13:15  12/02/18 14:09





  Vancomycin 1gm  IVPB   167 mls/hr





  Q12H LEONARDA   Administration





     





     





  Protocol   





     


 


Piperacillin Sod/Tazobactam Sod  100 mls @ 25 mls/hr  11/29/18 16:00  12/02/18 

16:29





  Zosyn 3.375 In Ns 100ml  IVPB   25 mls/hr





  Q8H LEONARDA   Administration





     





     





  Protocol   





     


 


Dopamine HCl/Dextrose  400 mg in 250 mls @ 15.811 mls/hr  12/02/18 00:57  

12/02/18 07:00





  Dopamine 400mg/250ml D5w  IV   0 mcg/kg/min





  .[id number] PRN   0 mls/hr





  TITRATE PER MD ORDER   Titration





     





  Protocol   





  5 MCG/KG/MIN   


 


NOREPINEPHRINE BIT/0.9 % NACL  4 mg in 250 mls @ 15 mls/hr  12/02/18 01:09  

12/02/18 16:29





  Levophed 4 Mg/ 250 Ml Ns Premixed  IV   6 mcg/min





  .[id number] PRN   22.5 mls/hr





  TITRATE PER MD ORDER   Administration





     





  Protocol   





  4 MCG/MIN   


 


Vasopressin 20 units/ Dextrose  101 mls @ 9.09 mls/hr  12/02/18 01:45  12/02/18 

12:00





  IV   9.09 mls/hr





  .[id number] LEONARDA   Administration





     





     





  Protocol   





  0.03 U/MIN   


 


Sodium Chloride  1,000 mls @ 100 mls/hr  12/02/18 02:30  12/02/18 12:30





  Sodium Chloride 0.45%  IV   100 mls/hr





  .Q10H LEONARDA   Administration





     





     





     





     


 


Dexmedetomidine HCl  400 mcg in 100 mls @ 4.216 mls/hr  12/02/18 02:50  12/02/18

 05:19





  Precedex 400mcg/100ml  IV   1 mcg/kg/hr





  .[id number] PRN   21.081 mls/hr





  Heart rate   Titration





     





  Protocol   





  0.2 MCG/KG/HR   


 


Amiodarone HCl/Dextrose  360 mg in 200 mls @ 16.667 mls/hr  12/02/18 14:16  

12/02/18 14:30





  Nexterone 360 Mg In D5w 200 Ml (Premix)  IV   16.667 mls/hr





  .Q12H LEONARDA   Administration





     





     





  Protocol   





  0.5 MG/MIN   


 


Levothyroxine Sodium  125 mcg  11/27/18 10:00  11/28/18 09:38





  Synthroid  PO   Not Given





  DAILY LEONARDA   





     





     





     





     


 


Levothyroxine Sodium  60 mcg  11/29/18 10:00  12/02/18 14:39





  Synthroid  IVP   60 mcg





  DAILY LEONARDA   Administration





     





     





     





     


 


Lorazepam  0.5 mg  12/01/18 11:22  12/01/18 12:51





  Ativan  IVP   0.5 mg





  Q6H PRN   Administration





  Anxiety/Agitation   





     





  Protocol   





     


 


Nystatin  0 gm  11/28/18 04:15  12/02/18 14:03





  Nystop Topical Powder  TOP   1 applic





  Q8H LEONARDA   Administration





     





     





     





     


 


Pantoprazole Sodium  40 mg  11/27/18 10:00  12/02/18 14:10





  Protonix Inj  IVP   40 mg





  DAILY LEONARDA   Administration





     





     





     





     


 


Prednisolone Acetate  0 ml  11/27/18 16:00  12/02/18 14:04





  Pred Forte 1% Opht Susp  OS   1 drop





  DAILY LEONARDA   Administration





     





     





     





     


 


Risperidone  0.5 mg  11/26/18 22:00  12/01/18 22:46





  Risperdal Oral Soln  PO   Not Given





  HS LEONARDA   





     





     





  Protocol   





     


 


Thiamine HCl  100 mg  11/28/18 18:00  12/02/18 17:43





  Vitamin B1 Tab  PO   Not Given





  BID LEONARDA   





     





     





     





     


 


Ziprasidone  10 mg  11/28/18 09:25  11/30/18 21:54





  Geodon Inj  IM   10 mg





  Q12 PRN   Administration





  severe agitaiton/psychosis   





     





  Protocol   





     














- Patient Studies


Lab Studies: 


                              Microbiology Studies











 12/02/18 02:05 Gram Stain - Final





 Trachasp 


 


 11/26/18 21:29 Blood Culture - Final





 Blood-Venous    NO GROWTH AFTER 5 DAYS





 Gram Stain - Final





    TEST NOT PERFORMED


 


 11/26/18 21:29 Blood Culture - Final





 Blood-Venous    NO GROWTH AFTER 5 DAYS





 Gram Stain - Final





    TEST NOT PERFORMED








                                   Lab Studies











  12/02/18 12/02/18 12/02/18 Range/Units





  16:08 11:52 06:00 


 


WBC     (4.5-11.0)  10^3/uL


 


RBC     (3.5-6.1)  10^6/uL


 


Hgb     (14.0-18.0)  g/dL


 


Hct     (42.0-52.0)  %


 


MCV     (80.0-105.0)  fl


 


MCH     (25.0-35.0)  pg


 


MCHC     (31.0-37.0)  g/dl


 


RDW     (11.5-14.5)  %


 


Plt Count     (120.0-450.0)  10^3/uL


 


MPV     (7.0-11.0)  fl


 


Gran %     (50.0-68.0)  %


 


Lymph % (Auto)     (22.0-35.0)  %


 


Mono % (Auto)     (1.0-6.0)  %


 


Eos % (Auto)     (1.5-5.0)  %


 


Baso % (Auto)     (0.0-3.0)  %


 


Gran #     (1.4-6.5)  


 


Lymph # (Auto)     (1.2-3.4)  


 


Mono # (Auto)     (0.1-0.6)  


 


Eos # (Auto)     (0.0-0.7)  


 


Baso # (Auto)     (0.0-2.0)  K/mm3


 


pCO2     (35-45)  mm/Hg


 


pO2    55  ()  mm/Hg


 


HCO3     (21-28)  mmol/L


 


ABG pH     (7.35-7.45)  


 


ABG Total CO2     (22-28)  mmol.L


 


ABG O2 Saturation     (95-98)  %


 


ABG O2 Content     (15-23)  ML/dl


 


ABG Base Excess     (-2.0-3.0)  mmol/L


 


ABG Hemoglobin     (11.7-17.4)  g/dL


 


ABG Carboxyhemoglobin     (0.5-1.5)  %


 


POC ABG HHb (Measured)     (0-5)  %


 


ABG Methemoglobin     (0.0-3.0)  %


 


ABG O2 Capacity     (16-24)  mL/dl


 


ABG Potassium     (3.6-5.2)  mmol/L


 


VBG pH    7.17 L*  (7.32-7.43)  


 


VBG pCO2    63.0 H  (40-60)  


 


VBG HCO3    23.0  (21-28)  mmol/l


 


VBG Total CO2    24.9  (22-28)  mmol.L


 


VBG O2 Sat (Calc)    87.3 H  (40-65)  %


 


VBG Base Excess    -6.5 L  (0.0-2.0)  mmol/L


 


VBG Potassium    4.1  (3.6-5.2)  mmol/L


 


Hgb O2 Saturation     (95.0-98.0)  %


 


Glucose    188 H  ()  mg/dl


 


Lactate    2.0  (0.7-2.1)  mmol/L


 


FiO2    21.0  %


 


Sodium    154.0 H  (132-148)  mmol/L


 


Potassium     (3.6-5.0)  mmol/L


 


Chloride    118.0 H  ()  mmol/L


 


Carbon Dioxide     (21-33)  mmol/L


 


Anion Gap     (10-20)  


 


BUN     (7-21)  mg/dL


 


Creatinine     (0.8-1.5)  mg/dl


 


Est GFR (African Amer)     


 


Est GFR (Non-Af Amer)     


 


POC Glucose (mg/dL)  266 H  200 H   ()  mg/dL


 


Random Glucose     ()  mg/dL


 


Calcium     (8.4-10.5)  mg/dL


 


Phosphorus     (2.5-4.5)  mg/dL


 


Magnesium     (1.7-2.2)  mg/dL


 


Total Bilirubin     (0.2-1.3)  mg/dL


 


AST     (17-59)  U/L


 


ALT     (7-56)  U/L


 


Alkaline Phosphatase     ()  U/L


 


Troponin I     ng/mL


 


Total Protein     (5.8-8.3)  g/dL


 


Albumin     (3.0-4.8)  g/dL


 


Globulin     gm/dL


 


Albumin/Globulin Ratio     (1.1-1.8)  


 


Procalcitonin     (0.19-0.49)  NG/ML


 


TSH 3rd Generation     (0.46-4.68)  mIU/mL


 


Arterial Blood Potassium     (3.6-5.2)  mmol/L


 


Venous Blood Potassium    4.1  (3.6-5.2)  mmol/L


 


Urine Color     (YELLOW)  


 


Urine Appearance     (CLEAR)  


 


Urine pH     (4.7-8.0)  


 


Ur Specific Gravity     (1.005-1.035)  


 


Urine Protein     (<30 mg/dL)  mg/dL


 


Urine Glucose (UA)     (NEGATIVE)  mg/dL


 


Urine Ketones     (NEGATIVE)  mg/dL


 


Urine Blood     (NEGATIVE)  


 


Urine Nitrate     (NEGATIVE)  


 


Urine Bilirubin     (NEGATIVE)  


 


Urine Urobilinogen     (<1 E.U./dL)  E.U./dL


 


Ur Leukocyte Esterase     (NEGATIVE)  Suad/uL


 


Urine RBC     (0-2)  /hpf


 


Urine WBC     (0-6)  /hpf


 


Ur Epithelial Cells     (0-5)  /hpf


 


Urine Bacteria     (NEG)  














  12/02/18 12/02/18 12/02/18 Range/Units





  02:15 01:30 01:00 


 


WBC     (4.5-11.0)  10^3/uL


 


RBC     (3.5-6.1)  10^6/uL


 


Hgb     (14.0-18.0)  g/dL


 


Hct     (42.0-52.0)  %


 


MCV     (80.0-105.0)  fl


 


MCH     (25.0-35.0)  pg


 


MCHC     (31.0-37.0)  g/dl


 


RDW     (11.5-14.5)  %


 


Plt Count     (120.0-450.0)  10^3/uL


 


MPV     (7.0-11.0)  fl


 


Gran %     (50.0-68.0)  %


 


Lymph % (Auto)     (22.0-35.0)  %


 


Mono % (Auto)     (1.0-6.0)  %


 


Eos % (Auto)     (1.5-5.0)  %


 


Baso % (Auto)     (0.0-3.0)  %


 


Gran #     (1.4-6.5)  


 


Lymph # (Auto)     (1.2-3.4)  


 


Mono # (Auto)     (0.1-0.6)  


 


Eos # (Auto)     (0.0-0.7)  


 


Baso # (Auto)     (0.0-2.0)  K/mm3


 


pCO2  61 H    (35-45)  mm/Hg


 


pO2  471.0 H    ()  mm/Hg


 


HCO3  22.3    (21-28)  mmol/L


 


ABG pH  7.17 L*    (7.35-7.45)  


 


ABG Total CO2  24.2    (22-28)  mmol.L


 


ABG O2 Saturation  100.3 H    (95-98)  %


 


ABG O2 Content     (15-23)  ML/dl


 


ABG Base Excess  -7.1 L    (-2.0-3.0)  mmol/L


 


ABG Hemoglobin     (11.7-17.4)  g/dL


 


ABG Carboxyhemoglobin     (0.5-1.5)  %


 


POC ABG HHb (Measured)     (0-5)  %


 


ABG Methemoglobin     (0.0-3.0)  %


 


ABG O2 Capacity     (16-24)  mL/dl


 


ABG Potassium  3.4 L    (3.6-5.2)  mmol/L


 


VBG pH     (7.32-7.43)  


 


VBG pCO2     (40-60)  


 


VBG HCO3     (21-28)  mmol/l


 


VBG Total CO2     (22-28)  mmol.L


 


VBG O2 Sat (Calc)     (40-65)  %


 


VBG Base Excess     (0.0-2.0)  mmol/L


 


VBG Potassium     (3.6-5.2)  mmol/L


 


Hgb O2 Saturation     (95.0-98.0)  %


 


Glucose  158 H    ()  mg/dl


 


Lactate  3.1 H    (0.7-2.1)  mmol/L


 


FiO2  100.0    %


 


Sodium  155.0 H    (132-148)  mmol/L


 


Potassium     (3.6-5.0)  mmol/L


 


Chloride  121.0 H    ()  mmol/L


 


Carbon Dioxide     (21-33)  mmol/L


 


Anion Gap     (10-20)  


 


BUN     (7-21)  mg/dL


 


Creatinine     (0.8-1.5)  mg/dl


 


Est GFR (African Amer)     


 


Est GFR (Non-Af Amer)     


 


POC Glucose (mg/dL)     ()  mg/dL


 


Random Glucose     ()  mg/dL


 


Calcium     (8.4-10.5)  mg/dL


 


Phosphorus     (2.5-4.5)  mg/dL


 


Magnesium     (1.7-2.2)  mg/dL


 


Total Bilirubin     (0.2-1.3)  mg/dL


 


AST     (17-59)  U/L


 


ALT     (7-56)  U/L


 


Alkaline Phosphatase     ()  U/L


 


Troponin I     ng/mL


 


Total Protein     (5.8-8.3)  g/dL


 


Albumin     (3.0-4.8)  g/dL


 


Globulin     gm/dL


 


Albumin/Globulin Ratio     (1.1-1.8)  


 


Procalcitonin    0.09 L  (0.19-0.49)  NG/ML


 


TSH 3rd Generation     (0.46-4.68)  mIU/mL


 


Arterial Blood Potassium  3.4 L    (3.6-5.2)  mmol/L


 


Venous Blood Potassium     (3.6-5.2)  mmol/L


 


Urine Color   Yellow   (YELLOW)  


 


Urine Appearance   Sl cloudy   (CLEAR)  


 


Urine pH   6.0   (4.7-8.0)  


 


Ur Specific Gravity   >= 1.030   (1.005-1.035)  


 


Urine Protein   30 H   (<30 mg/dL)  mg/dL


 


Urine Glucose (UA)   Negative   (NEGATIVE)  mg/dL


 


Urine Ketones   15 H   (NEGATIVE)  mg/dL


 


Urine Blood   Moderate H   (NEGATIVE)  


 


Urine Nitrate   Negative   (NEGATIVE)  


 


Urine Bilirubin   Small H   (NEGATIVE)  


 


Urine Urobilinogen   0.2   (<1 E.U./dL)  E.U./dL


 


Ur Leukocyte Esterase   Negative   (NEGATIVE)  Suad/uL


 


Urine RBC   5 - 10   (0-2)  /hpf


 


Urine WBC   0 - 2   (0-6)  /hpf


 


Ur Epithelial Cells   0 - 2   (0-5)  /hpf


 


Urine Bacteria   Few   (NEG)  














  12/02/18 12/02/18 12/02/18 Range/Units





  01:00 01:00 01:00 


 


WBC    24.3 H D  (4.5-11.0)  10^3/uL


 


RBC    4.16  (3.5-6.1)  10^6/uL


 


Hgb    13.0 L  (14.0-18.0)  g/dL


 


Hct    42.4  (42.0-52.0)  %


 


MCV    101.9  D  (80.0-105.0)  fl


 


MCH    31.3  (25.0-35.0)  pg


 


MCHC    30.7 L  (31.0-37.0)  g/dl


 


RDW    14.5  (11.5-14.5)  %


 


Plt Count    274  (120.0-450.0)  10^3/uL


 


MPV    10.1  (7.0-11.0)  fl


 


Gran %    65.8  (50.0-68.0)  %


 


Lymph % (Auto)    27.2  (22.0-35.0)  %


 


Mono % (Auto)    6.0  (1.0-6.0)  %


 


Eos % (Auto)    0.5 L  (1.5-5.0)  %


 


Baso % (Auto)    0.5  (0.0-3.0)  %


 


Gran #    15.99 H  (1.4-6.5)  


 


Lymph # (Auto)    6.6 H  (1.2-3.4)  


 


Mono # (Auto)    1.5 H  (0.1-0.6)  


 


Eos # (Auto)    0.1  (0.0-0.7)  


 


Baso # (Auto)    0.11  (0.0-2.0)  K/mm3


 


pCO2     (35-45)  mm/Hg


 


pO2     ()  mm/Hg


 


HCO3     (21-28)  mmol/L


 


ABG pH     (7.35-7.45)  


 


ABG Total CO2     (22-28)  mmol.L


 


ABG O2 Saturation     (95-98)  %


 


ABG O2 Content     (15-23)  ML/dl


 


ABG Base Excess     (-2.0-3.0)  mmol/L


 


ABG Hemoglobin     (11.7-17.4)  g/dL


 


ABG Carboxyhemoglobin     (0.5-1.5)  %


 


POC ABG HHb (Measured)     (0-5)  %


 


ABG Methemoglobin     (0.0-3.0)  %


 


ABG O2 Capacity     (16-24)  mL/dl


 


ABG Potassium     (3.6-5.2)  mmol/L


 


VBG pH     (7.32-7.43)  


 


VBG pCO2     (40-60)  


 


VBG HCO3     (21-28)  mmol/l


 


VBG Total CO2     (22-28)  mmol.L


 


VBG O2 Sat (Calc)     (40-65)  %


 


VBG Base Excess     (0.0-2.0)  mmol/L


 


VBG Potassium     (3.6-5.2)  mmol/L


 


Hgb O2 Saturation     (95.0-98.0)  %


 


Glucose     ()  mg/dl


 


Lactate     (0.7-2.1)  mmol/L


 


FiO2     %


 


Sodium   154 H   (132-148)  mmol/L


 


Potassium   4.2   (3.6-5.0)  mmol/L


 


Chloride   122 H   ()  mmol/L


 


Carbon Dioxide   22   (21-33)  mmol/L


 


Anion Gap   14   (10-20)  


 


BUN   19   (7-21)  mg/dL


 


Creatinine   1.2   (0.8-1.5)  mg/dl


 


Est GFR (African Amer)   > 60   


 


Est GFR (Non-Af Amer)   58   


 


POC Glucose (mg/dL)     ()  mg/dL


 


Random Glucose   111 H   ()  mg/dL


 


Calcium   8.6   (8.4-10.5)  mg/dL


 


Phosphorus  5.1 H    (2.5-4.5)  mg/dL


 


Magnesium  2.5 H    (1.7-2.2)  mg/dL


 


Total Bilirubin   1.3   (0.2-1.3)  mg/dL


 


AST   151 H D   (17-59)  U/L


 


ALT   149 H   (7-56)  U/L


 


Alkaline Phosphatase   78   ()  U/L


 


Troponin I  0.12  D    ng/mL


 


Total Protein   6.2   (5.8-8.3)  g/dL


 


Albumin   3.2   (3.0-4.8)  g/dL


 


Globulin   2.9   gm/dL


 


Albumin/Globulin Ratio   1.1   (1.1-1.8)  


 


Procalcitonin     (0.19-0.49)  NG/ML


 


TSH 3rd Generation     (0.46-4.68)  mIU/mL


 


Arterial Blood Potassium     (3.6-5.2)  mmol/L


 


Venous Blood Potassium     (3.6-5.2)  mmol/L


 


Urine Color     (YELLOW)  


 


Urine Appearance     (CLEAR)  


 


Urine pH     (4.7-8.0)  


 


Ur Specific Gravity     (1.005-1.035)  


 


Urine Protein     (<30 mg/dL)  mg/dL


 


Urine Glucose (UA)     (NEGATIVE)  mg/dL


 


Urine Ketones     (NEGATIVE)  mg/dL


 


Urine Blood     (NEGATIVE)  


 


Urine Nitrate     (NEGATIVE)  


 


Urine Bilirubin     (NEGATIVE)  


 


Urine Urobilinogen     (<1 E.U./dL)  E.U./dL


 


Ur Leukocyte Esterase     (NEGATIVE)  Suad/uL


 


Urine RBC     (0-2)  /hpf


 


Urine WBC     (0-6)  /hpf


 


Ur Epithelial Cells     (0-5)  /hpf


 


Urine Bacteria     (NEG)  














  12/01/18 12/01/18 12/01/18 Range/Units





  23:52 18:25 06:00 


 


WBC     (4.5-11.0)  10^3/uL


 


RBC     (3.5-6.1)  10^6/uL


 


Hgb     (14.0-18.0)  g/dL


 


Hct     (42.0-52.0)  %


 


MCV     (80.0-105.0)  fl


 


MCH     (25.0-35.0)  pg


 


MCHC     (31.0-37.0)  g/dl


 


RDW     (11.5-14.5)  %


 


Plt Count     (120.0-450.0)  10^3/uL


 


MPV     (7.0-11.0)  fl


 


Gran %     (50.0-68.0)  %


 


Lymph % (Auto)     (22.0-35.0)  %


 


Mono % (Auto)     (1.0-6.0)  %


 


Eos % (Auto)     (1.5-5.0)  %


 


Baso % (Auto)     (0.0-3.0)  %


 


Gran #     (1.4-6.5)  


 


Lymph # (Auto)     (1.2-3.4)  


 


Mono # (Auto)     (0.1-0.6)  


 


Eos # (Auto)     (0.0-0.7)  


 


Baso # (Auto)     (0.0-2.0)  K/mm3


 


pCO2   36   (35-45)  mm/Hg


 


pO2   139.0 H   ()  mm/Hg


 


HCO3   22.3   (21-28)  mmol/L


 


ABG pH   7.40   (7.35-7.45)  


 


ABG Total CO2   23.4   (22-28)  mmol.L


 


ABG O2 Saturation   99.3 H   (95-98)  %


 


ABG O2 Content   18.0   (15-23)  ML/dl


 


ABG Base Excess   -2.0   (-2.0-3.0)  mmol/L


 


ABG Hemoglobin   13.1   (11.7-17.4)  g/dL


 


ABG Carboxyhemoglobin   1.8 H   (0.5-1.5)  %


 


POC ABG HHb (Measured)   0.7   (0-5)  %


 


ABG Methemoglobin   0.9   (0.0-3.0)  %


 


ABG O2 Capacity   18.1   (16-24)  mL/dl


 


ABG Potassium     (3.6-5.2)  mmol/L


 


VBG pH     (7.32-7.43)  


 


VBG pCO2     (40-60)  


 


VBG HCO3     (21-28)  mmol/l


 


VBG Total CO2     (22-28)  mmol.L


 


VBG O2 Sat (Calc)     (40-65)  %


 


VBG Base Excess     (0.0-2.0)  mmol/L


 


VBG Potassium     (3.6-5.2)  mmol/L


 


Hgb O2 Saturation   96.6   (95.0-98.0)  %


 


Glucose     ()  mg/dl


 


Lactate     (0.7-2.1)  mmol/L


 


FiO2   40.0   %


 


Sodium     (132-148)  mmol/L


 


Potassium     (3.6-5.0)  mmol/L


 


Chloride     ()  mmol/L


 


Carbon Dioxide     (21-33)  mmol/L


 


Anion Gap     (10-20)  


 


BUN     (7-21)  mg/dL


 


Creatinine     (0.8-1.5)  mg/dl


 


Est GFR (African Amer)     


 


Est GFR (Non-Af Amer)     


 


POC Glucose (mg/dL)  110    ()  mg/dL


 


Random Glucose     ()  mg/dL


 


Calcium     (8.4-10.5)  mg/dL


 


Phosphorus     (2.5-4.5)  mg/dL


 


Magnesium     (1.7-2.2)  mg/dL


 


Total Bilirubin     (0.2-1.3)  mg/dL


 


AST     (17-59)  U/L


 


ALT     (7-56)  U/L


 


Alkaline Phosphatase     ()  U/L


 


Troponin I     ng/mL


 


Total Protein     (5.8-8.3)  g/dL


 


Albumin     (3.0-4.8)  g/dL


 


Globulin     gm/dL


 


Albumin/Globulin Ratio     (1.1-1.8)  


 


Procalcitonin     (0.19-0.49)  NG/ML


 


TSH 3rd Generation    3.80  (0.46-4.68)  mIU/mL


 


Arterial Blood Potassium     (3.6-5.2)  mmol/L


 


Venous Blood Potassium     (3.6-5.2)  mmol/L


 


Urine Color     (YELLOW)  


 


Urine Appearance     (CLEAR)  


 


Urine pH     (4.7-8.0)  


 


Ur Specific Gravity     (1.005-1.035)  


 


Urine Protein     (<30 mg/dL)  mg/dL


 


Urine Glucose (UA)     (NEGATIVE)  mg/dL


 


Urine Ketones     (NEGATIVE)  mg/dL


 


Urine Blood     (NEGATIVE)  


 


Urine Nitrate     (NEGATIVE)  


 


Urine Bilirubin     (NEGATIVE)  


 


Urine Urobilinogen     (<1 E.U./dL)  E.U./dL


 


Ur Leukocyte Esterase     (NEGATIVE)  Suad/uL


 


Urine RBC     (0-2)  /hpf


 


Urine WBC     (0-6)  /hpf


 


Ur Epithelial Cells     (0-5)  /hpf


 


Urine Bacteria     (NEG)  








                         Laboratory Results - last 24 hr











  12/01/18 12/01/18 12/01/18





  06:00 18:25 23:52


 


WBC   


 


RBC   


 


Hgb   


 


Hct   


 


MCV   


 


MCH   


 


MCHC   


 


RDW   


 


Plt Count   


 


MPV   


 


Gran %   


 


Lymph % (Auto)   


 


Mono % (Auto)   


 


Eos % (Auto)   


 


Baso % (Auto)   


 


Gran #   


 


Lymph # (Auto)   


 


Mono # (Auto)   


 


Eos # (Auto)   


 


Baso # (Auto)   


 


pCO2   36 


 


pO2   139.0 H 


 


HCO3   22.3 


 


ABG pH   7.40 


 


ABG Total CO2   23.4 


 


ABG O2 Saturation   99.3 H 


 


ABG O2 Content   18.0 


 


ABG Base Excess   -2.0 


 


ABG Hemoglobin   13.1 


 


ABG Carboxyhemoglobin   1.8 H 


 


POC ABG HHb (Measured)   0.7 


 


ABG Methemoglobin   0.9 


 


ABG O2 Capacity   18.1 


 


ABG Potassium   


 


VBG pH   


 


VBG pCO2   


 


VBG HCO3   


 


VBG Total CO2   


 


VBG O2 Sat (Calc)   


 


VBG Base Excess   


 


VBG Potassium   


 


Hgb O2 Saturation   96.6 


 


Glucose   


 


Lactate   


 


FiO2   40.0 


 


Sodium   


 


Potassium   


 


Chloride   


 


Carbon Dioxide   


 


Anion Gap   


 


BUN   


 


Creatinine   


 


Est GFR ( Amer)   


 


Est GFR (Non-Af Amer)   


 


POC Glucose (mg/dL)    110


 


Random Glucose   


 


Calcium   


 


Phosphorus   


 


Magnesium   


 


Total Bilirubin   


 


AST   


 


ALT   


 


Alkaline Phosphatase   


 


Troponin I   


 


Total Protein   


 


Albumin   


 


Globulin   


 


Albumin/Globulin Ratio   


 


Procalcitonin   


 


TSH 3rd Generation  3.80  


 


Arterial Blood Potassium   


 


Venous Blood Potassium   


 


Urine Color   


 


Urine Appearance   


 


Urine pH   


 


Ur Specific Gravity   


 


Urine Protein   


 


Urine Glucose (UA)   


 


Urine Ketones   


 


Urine Blood   


 


Urine Nitrate   


 


Urine Bilirubin   


 


Urine Urobilinogen   


 


Ur Leukocyte Esterase   


 


Urine RBC   


 


Urine WBC   


 


Ur Epithelial Cells   


 


Urine Bacteria   














  12/02/18 12/02/18 12/02/18





  01:00 01:00 01:00


 


WBC  24.3 H D  


 


RBC  4.16  


 


Hgb  13.0 L  


 


Hct  42.4  


 


MCV  101.9  D  


 


MCH  31.3  


 


MCHC  30.7 L  


 


RDW  14.5  


 


Plt Count  274  


 


MPV  10.1  


 


Gran %  65.8  


 


Lymph % (Auto)  27.2  


 


Mono % (Auto)  6.0  


 


Eos % (Auto)  0.5 L  


 


Baso % (Auto)  0.5  


 


Gran #  15.99 H  


 


Lymph # (Auto)  6.6 H  


 


Mono # (Auto)  1.5 H  


 


Eos # (Auto)  0.1  


 


Baso # (Auto)  0.11  


 


pCO2   


 


pO2   


 


HCO3   


 


ABG pH   


 


ABG Total CO2   


 


ABG O2 Saturation   


 


ABG O2 Content   


 


ABG Base Excess   


 


ABG Hemoglobin   


 


ABG Carboxyhemoglobin   


 


POC ABG HHb (Measured)   


 


ABG Methemoglobin   


 


ABG O2 Capacity   


 


ABG Potassium   


 


VBG pH   


 


VBG pCO2   


 


VBG HCO3   


 


VBG Total CO2   


 


VBG O2 Sat (Calc)   


 


VBG Base Excess   


 


VBG Potassium   


 


Hgb O2 Saturation   


 


Glucose   


 


Lactate   


 


FiO2   


 


Sodium   154 H 


 


Potassium   4.2 


 


Chloride   122 H 


 


Carbon Dioxide   22 


 


Anion Gap   14 


 


BUN   19 


 


Creatinine   1.2 


 


Est GFR ( Amer)   > 60 


 


Est GFR (Non-Af Amer)   58 


 


POC Glucose (mg/dL)   


 


Random Glucose   111 H 


 


Calcium   8.6 


 


Phosphorus    5.1 H


 


Magnesium    2.5 H


 


Total Bilirubin   1.3 


 


AST   151 H D 


 


ALT   149 H 


 


Alkaline Phosphatase   78 


 


Troponin I    0.12  D


 


Total Protein   6.2 


 


Albumin   3.2 


 


Globulin   2.9 


 


Albumin/Globulin Ratio   1.1 


 


Procalcitonin   


 


TSH 3rd Generation   


 


Arterial Blood Potassium   


 


Venous Blood Potassium   


 


Urine Color   


 


Urine Appearance   


 


Urine pH   


 


Ur Specific Gravity   


 


Urine Protein   


 


Urine Glucose (UA)   


 


Urine Ketones   


 


Urine Blood   


 


Urine Nitrate   


 


Urine Bilirubin   


 


Urine Urobilinogen   


 


Ur Leukocyte Esterase   


 


Urine RBC   


 


Urine WBC   


 


Ur Epithelial Cells   


 


Urine Bacteria   














  12/02/18 12/02/18 12/02/18





  01:00 01:30 02:15


 


WBC   


 


RBC   


 


Hgb   


 


Hct   


 


MCV   


 


MCH   


 


MCHC   


 


RDW   


 


Plt Count   


 


MPV   


 


Gran %   


 


Lymph % (Auto)   


 


Mono % (Auto)   


 


Eos % (Auto)   


 


Baso % (Auto)   


 


Gran #   


 


Lymph # (Auto)   


 


Mono # (Auto)   


 


Eos # (Auto)   


 


Baso # (Auto)   


 


pCO2    61 H


 


pO2    471.0 H


 


HCO3    22.3


 


ABG pH    7.17 L*


 


ABG Total CO2    24.2


 


ABG O2 Saturation    100.3 H


 


ABG O2 Content   


 


ABG Base Excess    -7.1 L


 


ABG Hemoglobin   


 


ABG Carboxyhemoglobin   


 


POC ABG HHb (Measured)   


 


ABG Methemoglobin   


 


ABG O2 Capacity   


 


ABG Potassium    3.4 L


 


VBG pH   


 


VBG pCO2   


 


VBG HCO3   


 


VBG Total CO2   


 


VBG O2 Sat (Calc)   


 


VBG Base Excess   


 


VBG Potassium   


 


Hgb O2 Saturation   


 


Glucose    158 H


 


Lactate    3.1 H


 


FiO2    100.0


 


Sodium    155.0 H


 


Potassium   


 


Chloride    121.0 H


 


Carbon Dioxide   


 


Anion Gap   


 


BUN   


 


Creatinine   


 


Est GFR ( Amer)   


 


Est GFR (Non-Af Amer)   


 


POC Glucose (mg/dL)   


 


Random Glucose   


 


Calcium   


 


Phosphorus   


 


Magnesium   


 


Total Bilirubin   


 


AST   


 


ALT   


 


Alkaline Phosphatase   


 


Troponin I   


 


Total Protein   


 


Albumin   


 


Globulin   


 


Albumin/Globulin Ratio   


 


Procalcitonin  0.09 L  


 


TSH 3rd Generation   


 


Arterial Blood Potassium    3.4 L


 


Venous Blood Potassium   


 


Urine Color   Yellow 


 


Urine Appearance   Sl cloudy 


 


Urine pH   6.0 


 


Ur Specific Gravity   >= 1.030 


 


Urine Protein   30 H 


 


Urine Glucose (UA)   Negative 


 


Urine Ketones   15 H 


 


Urine Blood   Moderate H 


 


Urine Nitrate   Negative 


 


Urine Bilirubin   Small H 


 


Urine Urobilinogen   0.2 


 


Ur Leukocyte Esterase   Negative 


 


Urine RBC   5 - 10 


 


Urine WBC   0 - 2 


 


Ur Epithelial Cells   0 - 2 


 


Urine Bacteria   Few 














  12/02/18 12/02/18 12/02/18





  06:00 11:52 16:08


 


WBC   


 


RBC   


 


Hgb   


 


Hct   


 


MCV   


 


MCH   


 


MCHC   


 


RDW   


 


Plt Count   


 


MPV   


 


Gran %   


 


Lymph % (Auto)   


 


Mono % (Auto)   


 


Eos % (Auto)   


 


Baso % (Auto)   


 


Gran #   


 


Lymph # (Auto)   


 


Mono # (Auto)   


 


Eos # (Auto)   


 


Baso # (Auto)   


 


pCO2   


 


pO2  55  


 


HCO3   


 


ABG pH   


 


ABG Total CO2   


 


ABG O2 Saturation   


 


ABG O2 Content   


 


ABG Base Excess   


 


ABG Hemoglobin   


 


ABG Carboxyhemoglobin   


 


POC ABG HHb (Measured)   


 


ABG Methemoglobin   


 


ABG O2 Capacity   


 


ABG Potassium   


 


VBG pH  7.17 L*  


 


VBG pCO2  63.0 H  


 


VBG HCO3  23.0  


 


VBG Total CO2  24.9  


 


VBG O2 Sat (Calc)  87.3 H  


 


VBG Base Excess  -6.5 L  


 


VBG Potassium  4.1  


 


Hgb O2 Saturation   


 


Glucose  188 H  


 


Lactate  2.0  


 


FiO2  21.0  


 


Sodium  154.0 H  


 


Potassium   


 


Chloride  118.0 H  


 


Carbon Dioxide   


 


Anion Gap   


 


BUN   


 


Creatinine   


 


Est GFR ( Amer)   


 


Est GFR (Non-Af Amer)   


 


POC Glucose (mg/dL)   200 H  266 H


 


Random Glucose   


 


Calcium   


 


Phosphorus   


 


Magnesium   


 


Total Bilirubin   


 


AST   


 


ALT   


 


Alkaline Phosphatase   


 


Troponin I   


 


Total Protein   


 


Albumin   


 


Globulin   


 


Albumin/Globulin Ratio   


 


Procalcitonin   


 


TSH 3rd Generation   


 


Arterial Blood Potassium   


 


Venous Blood Potassium  4.1  


 


Urine Color   


 


Urine Appearance   


 


Urine pH   


 


Ur Specific Gravity   


 


Urine Protein   


 


Urine Glucose (UA)   


 


Urine Ketones   


 


Urine Blood   


 


Urine Nitrate   


 


Urine Bilirubin   


 


Urine Urobilinogen   


 


Ur Leukocyte Esterase   


 


Urine RBC   


 


Urine WBC   


 


Ur Epithelial Cells   


 


Urine Bacteria   











EKG/Cardiology Studies: 


Cardiology / EKG Studies





12/02/18 02:11


EKG [ELECTROCARDIOGRAM] Stat 


   Comment: 


   Reason For Exam: Cardiopulmonary arrest














Critical Care Progress Note





- Nutrition


Nutrition: 


                                    Nutrition











 Category Date Time Status


 


 NPO Diet [DIET] Diets  12/02/18 Breakfast Ordered














Addendum


Addendum: 





12/02/18 18:03


MICU ATTENDING ADDENDUM:


Patient seen and examined with housestaff. Case discussed in round with 

housestaff. Agree with note above with the following additions/exception:





82 year old male with PMHx of HTN, COPD, hx of small bilateral subdural hematoma

secondary to trauma from fall without neurosurgical intervention(4/2018), 

hypothyroidism, HLD, CAD s/p cardiac stent 2003 admitted to ICU after cardiac 

arrest over night.





Patient was initially admitted for confusion and AMS with negative neuro work 

and metabolic workup for causes.  I evaluated him yesterday evening and he was 

lethargic but arousable.  ABG shows he was having good air exchange and no 

problems maintaining his airway. Overnight, during placement of NGT tube she 

went into PEA Arrest.  Eventually ROSC achieved.





I am not sure of the etiology of arrest. It would be odd if the NGT insertion 

into the lung caused sudden cardiac arrest. This morning approx 0730 he again 

went into PEA arrest and we achieved ROSC. At one point he went into Vtach and I

deliver unsych shock.  Amio was started.





At this point we will maintain his BP with levophed to MAP > 65


Cont vent support, wean down fio2 if tolerated


Cont abx


Repeat CXR to ensure no aspiration/ards etc


While we look for causes, will check CTA Chest to r/o PE





His family was called several times by the night team but they were not able to 

reach them. This morning his wife arrived after his code and I updated her.  I 

informed her in regards to my evaluation of his last night. In addition, I 

informed her that an NGT tube was placed into the lung by accident and his went 

into cardiac arrest after. I shared my experience of seeing several NGT tubes ac

cidentally going into the lung abd being minimally consequential once identified

and removed so I could not confirm cause-effect relationship of his cardiac 

arrest. I answered all of her questions and let her know he was critically ill. 

Her son and daugher arrived later and I explained the above to them as well.  

They 3 of them were adamant on makign him DNR because they did not think he 

would want more CPR.  We agree that it would be beneficial to continue care 

otherwise while we cont workup.  





Rest of care above


Vincent Bond MD


Pulmonary Critical Care and Sleep Medicine


Critical Care Time: 70 minutes

## 2018-12-02 NOTE — PCM.PROC
Procedures


Attestation:: I certify that I have explained the specified Operation(s) or 

Procedure(s), risks, benefits and reasonable alternatives to the Patient and/or 

other person responsible. The opportunity was given to ask questions and all 

questions answered





- Intubation


Time Out Performed: Yes


Sedative: None


Laryngoscope: Luis Fernando (3)


ET Tube Size: 8.0


ET Tube Uncuffed: Yes


ET Tube Secured at Depth: 26


ET Tube Secured Locarion: Teeth


ET Tube Placement Confirmation: Visualized Passing Through Cords, Breath Sounds 

Equal Bilaterally, No Breath Sounds Over Epigastrum, Confirmation w/Capnometry


Patient Tolerated Procedure: Well


Procedure Immediate Complications: None


Additional comments: 





some emesis on posterior pharynx noted during intubation

## 2018-12-02 NOTE — PCM.RRT
RRT Nurse Assessment





- Situation


Date: 12/02/18


Time RRT was called: 00:01


RRT Responder Arrival Time: 00:01


RRT Location:: [address] [place] [address]


Room Number: 577-1


RRT Reason for Call: O2 Saturation below 90%


RRT Called By: RN





- IV


IV Inserted during RRT?: No





- Respiratory


Oxygen Delivery Method: Non Rebreather @%


Oxygen Flow Rate: 3


Received Nebulizer Treatments:: Yes


Secretions Suctioned?: Yes


Was the Patient Intubated?: Yes





- Medication


Medications Administered During RRT: epinephrine x 4





- Diagnostic Test Ordered


EKG: No


Chest X-Ray: Yes


CT Scan: No





- Stat Labs Ordered


RRT Stat Labs Ordered: ABG


CPR started during RRT?: Yes





- Vital Signs


Vital Sign: 


Rapid Response Vital Sign





Blood Pressure                   147/127


Pulse Rate                       52


Respiratory Rate                 18


Temperature                      98.7 F


Oxygen Saturation                95











- Finger Stick Blood Glucose


Finger Stick Blood Glucose: 110





- Time RRT Ended


Time RRT Ended: 00:20





- Vital Signs at end of RRT


Vital Signs at end of RRT: 


Rapid Response End Vital Sign





Blood Pressure                   140/95


Pulse Rate                       177


Respiratory Rate                 18


Temperature                      98.7 F


O2 Sat by Pulse Oximetry         95











- Recommendations


Notifications: Attending Physician





- Respiratory


Oxygen Delivery Method: Non Rebreather @%


Oxygen Flow Rate: 3





Addendum


Addendum: 





12/02/18 18:05


CODE TOSIN called this morning around 0730am as patient was in PEA arrest. 

Already intubated and had right fem TLC.


Chest compressions, EPi and bicarb given.  No pulses after several rounds until 

finally he had an organized rhythem that looked like Vtach.  I then synchronized

cardioverted him and he went into sinus tachycardia with a pulse.  A-line placed

and pressors titrate down as his BP was actually elevated.  Family was notifed. 

See official code sheet for details of time and meds given.  See my daily ICU 

progress note for plan post-code.


Vincent Bond MD


ICU Attending

## 2018-12-02 NOTE — CARD
--------------- APPROVED REPORT --------------





Date of service: 12/02/2018



EKG Measurement

Heart [id number]

AZ [id number]

[id number]

[id number]

[id number]



<Conclusion>

Sinus tachycardia

Left axis deviation

left anterior fascicular block

Abnormal ECG

## 2018-12-02 NOTE — RAD
Date of service: 



12/01/2018



HISTORY:

 NGT placement 



COMPARISON:

Comparison made with prior chest radiograph 12/01/2018 at 08:26 



FINDINGS:

Situ NGT-feeding tube the tip of which extends over the medial aspect 

of the lower left lung apparently within a hiatal hernia. 



LUNGS:

Mild bibasilar atelectasis left greater than right. 



PLEURA:

No significant pleural effusion identified, no pneumothorax apparent.



CARDIOVASCULAR:

Suspect mild aortic atherosclerotic calcification present.  Apparent 

aneurysmal dilatation of the ascending thoracic aorta 



Cardiomegaly.  No pulmonary vascular congestion.  Right-sided 

mediastinal shift again noted 



OSSEOUS STRUCTURES:

No significant abnormalities.



VISUALIZED UPPER ABDOMEN:

Normal.



OTHER FINDINGS:

None.



IMPRESSION:

Bibasilar atelectasis.  Situ NGT the tip of which lies a hiatal 

hernia

## 2018-12-02 NOTE — CP.PCM.PN
Subjective





- Date & Time of Evaluation


Date of Evaluation: 12/02/18


Time of Evaluation: 16:45





- Subjective


Subjective: 


Infectious Disease Follow Up:


December 2, 2018





83 [place] [facility] of [place] and worsening agitation.  

Unable to obtain further information from the patient as he is currently sedated

with Ativan.  History and events obtained from family, staff, and hospital 

chart.  The PMHx includes HTN, COPD, hx of small bilateral subdural hematoma 

secondary to trauma from fall without neurosurgical intervention(4/2018), 

hypothyroidism, HLD, CAD s/p cardiac stent 2003.  As per wife, the patient was 

having odd behavior 6 days prior to admission with disorganized and incoherent 

speech.  The patient has become more agitated and restless each day.  He was 

found to be tachycardic with low grade fevers up to 100.5 F.





He remains confused and is currently in soft restraints.  The patient's wife is 

in the room.  No other sick contacts.  The [facility].  He received steroid eye drops and ophthalmic antibiotics 

since that time.  As per the wife, the patient has been able to recognize her 

once in while during her visit.  He is otherwise confused and restless.





On Zosyn and Vancomycin IV.





Noted events starting from last night that led to Multiple Code Blue events.  

The patient was intubated and ventilated.  Desaturations prior to Code Blue 

event.  Patient's mentation appears the same.  He has been stable since the last

Code Blue this morning.





Objective





- Vital Signs/Intake and Output


Vital Signs (last 24 hours): 


                                        











Temp Pulse Resp BP Pulse Ox


 


 102.5 F H  91 H  15   111/62   98 


 


 12/02/18 18:20  12/02/18 18:00  12/02/18 07:09  12/02/18 18:00  12/02/18 18:00








Intake and Output: 


                                        











 12/02/18 12/02/18





 06:59 18:59


 


Intake Total 3750 570


 


Output Total 800 


 


Balance 2950 570














- Medications


Medications: 


                               Current Medications





Acetaminophen (Tylenol 325mg Tab)  650 mg PO Q6H PRN


   PRN Reason: Fever >100.4 F


Acetaminophen (Tylenol 650 Mg Supp)  650 mg RC Q6H PRN


   PRN Reason: Fever >100.4 F


   Last Admin: 12/02/18 18:20 Dose:  650 mg


Albuterol/Ipratropium (Duoneb 3 Mg/0.5 Mg (3 Ml) Ud)  3 ml IH Q2H PRN


   PRN Reason: Shortness of Breath


   Last Admin: 12/01/18 23:46 Dose:  3 ml


Albuterol/Ipratropium (Duoneb 3 Mg/0.5 Mg (3 Ml) Ud)  3 ml IH [id number] [facility]


   Last Admin: 12/02/18 13:15 Dose:  3 ml


Artificial Tears (Artificial Tears Opht Oint)  1 gm OU Q6 [facility]


   Last Admin: 12/02/18 17:44 Dose:  1 applic


Aspirin (Ecotrin)  81 mg PO DAILY [facility]


   Last Admin: 12/02/18 14:01 Dose:  Not Given


Atorvastatin Calcium (Lipitor)  40 mg PO DAILY [facility]


   Last Admin: 12/02/18 14:03 Dose:  Not Given


Brimonidine Tartrate (Alphagan P 0.15% Opht)  1 drop OS Q8H [facility]


   Last Admin: 12/02/18 14:00 Dose:  1 drop


Cholecalciferol (Vitamin D)  2,000 intlu PO DAILY [facility]


   Last Admin: 12/02/18 14:05 Dose:  Not Given


Home Med (Home Med)  0 unit OS TID [facility]


   Last Admin: 12/02/18 17:41 Dose:  1 unit


Vancomycin HCl (Vancomycin 1gm)  1 gm in 250 mls @ 167 mls/hr IVPB Q12H [facility]; 

Protocol


   Last Admin: 12/02/18 14:09 Dose:  167 mls/hr


Piperacillin Sod/Tazobactam Sod (Zosyn 3.375 In Ns 100ml)  100 mls @ 25 mls/hr 

IVPB Q8H [facility]; Protocol


   Last Admin: 12/02/18 16:29 Dose:  25 mls/hr


Dopamine HCl/Dextrose (Dopamine 400mg/250ml D5w)  400 mg in 250 mls @ 15.811 

mls/hr IV .[id number] PRN; Protocol


   PRN Reason: TITRATE PER MD ORDER


   Last Titration: 12/02/18 07:00 Dose:  0 mcg/kg/min, 0 mls/hr


NOREPINEPHRINE BIT/0.9 % NACL (Levophed 4 Mg/ 250 Ml Ns Premixed)  4 mg in 250 

mls @ 15 mls/hr IV .C11H01N PRN; Protocol


   PRN Reason: TITRATE PER MD ORDER


   Last Admin: 12/02/18 16:29 Dose:  6 mcg/min, 22.5 mls/hr


Vasopressin 20 units/ Dextrose  101 mls @ 9.09 mls/hr IV .Q11H7M [facility]; Protocol


   Last Admin: 12/02/18 12:00 Dose:  9.09 mls/hr


Sodium Chloride (Sodium Chloride 0.45%)  1,000 mls @ 100 mls/hr IV .Q10H [facility]


   Last Admin: 12/02/18 12:30 Dose:  100 mls/hr


Dexmedetomidine HCl (Precedex 400mcg/100ml)  400 mcg in 100 mls @ 4.216 mls/hr 

IV .A19G55H [facility]N; Protocol


   PRN Reason: Heart rate


   Last Titration: 12/02/18 05:19 Dose:  1 mcg/kg/hr, 21.081 mls/hr


Amiodarone HCl/Dextrose (Nexterone 360 Mg In D5w 200 Ml (Premix))  360 mg in 200

mls @ 16.667 mls/hr IV .Q12H [facility]; Protocol


   Last Admin: 12/02/18 14:30 Dose:  16.667 mls/hr


Levothyroxine Sodium (Synthroid)  125 mcg PO DAILY [facility]


   Last Admin: 11/28/18 09:38 Dose:  Not Given


Levothyroxine Sodium (Synthroid)  60 mcg IVP DAILY [facility]


   Last Admin: 12/02/18 14:39 Dose:  60 mcg


Lorazepam (Ativan)  0.5 mg IVP Q6H [facility]; Protocol


   PRN Reason: Anxiety/Agitation


   Last Admin: 12/01/18 12:51 Dose:  0.5 mg


Nystatin (Nystop Topical Powder)  0 gm TOP Q8H [facility]


   Last Admin: 12/02/18 14:03 Dose:  1 applic


Pantoprazole Sodium (Protonix Inj)  40 mg IVP DAILY [facility]


   Last Admin: 12/02/18 14:10 Dose:  40 mg


Prednisolone Acetate (Pred Forte 1% Opht Susp)  0 ml OS DAILY [facility]


   Last Admin: 12/02/18 14:04 Dose:  1 drop


Risperidone (Risperdal Oral Soln)  0.5 mg PO HS [facility]; Protocol


   Last Admin: 12/01/18 22:46 Dose:  Not Given


Thiamine HCl (Vitamin B1 Tab)  100 mg PO BID [facility]


   Last Admin: 12/02/18 17:43 Dose:  Not Given


Ziprasidone (Geodon Inj)  10 mg IM Q12 [facility]; Protocol


   PRN Reason: severe agitaiton/psychosis


   Last Admin: 11/30/18 21:54 Dose:  10 mg











- Labs


Labs: 


                                        





                                 12/02/18 01:00 





                                 12/02/18 01:00 





                                        











PT  14.2 SECONDS (9.4-12.5)  H  11/27/18  05:15    


 


INR  1.23   11/27/18  05:15    


 


APTT  32.3 Seconds (25.1-36.5)   11/26/18  18:56    














- Constitutional


Appears: Agitated, Confused, Chronically Ill





- Head Exam


Head Exam: ATRAUMATIC, NORMOCEPHALIC





- Eye Exam


Eye Exam: EOMI, Normal appearance (left eye)


Pupil Exam: Irregular, Unequal


Additional comments: 


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





- ENT Exam


ENT Exam: Mucous Membranes Moist, Normal External Ear Exam, TM's Normal 

Bilaterally





- Neck Exam


Neck Exam: Full ROM, Normal Inspection





- Respiratory Exam


Respiratory Exam: Decreased Breath Sounds.  absent: Rales, Rhonchi, Wheezes


Additional comments: 


Intubated and ventilated.





- Cardiovascular Exam


Cardiovascular Exam: REGULAR RHYTHM, RRR, +S1, +S2





- GI/Abdominal Exam


GI & Abdominal Exam: Soft, Normal Bowel Sounds.  absent: Distended, Tenderness





- Extremities Exam


Extremities Exam: Full ROM, Normal Inspection





- Neurological Exam


Additional comments: 


Not following commands, restless, confused, incoherent usually during this 

hospitalization.  However, the patient has had periods of lucency as per wife.  

Currently intubated and ventilated.





- Psychiatric Exam


Additional comments: 


Intubated, Ventilated, and sedated.





- Skin


Skin Exam: Dry, Intact, Normal Color





Assessment and Plan





- Assessment and Plan (Free Text)


Assessment: 


83 yo  male brought in for AMS and Confusion.  CT of Abdomen showing 

Infrarenal Aortic Hernia and right lung base consolidation suggestive of a 

pneumonia.  On Rocephin and Vancomycin IV.  CT head did not show any bleeding in

the brain.  Procalcitonin sent.  Supportive care.  Pan cultures sent.  Mild 

leukocytosis.





Awaiting ammonia and tox screen.  Procalcitonin <0.05.  This argues against a 

septic or pneumonic process.  Ammonia < 9.





Patient with Hypothyroidism, CAD, COPD, and severe hearing impairment.





An LP may be useful on this patient.





Leukocytosis of 24.3 today.  Cultures remain negative.





Events of the last 24 hours noted.  Code Blue events.  On Zosyn and IV 

Vancomycin.  Will switch to Meropenem from Zosyn.





Poor prognosis.





Thank you for allowing me to participate in the care of the patient, we will 

follow with you.

## 2018-12-02 NOTE — RAD
Date of service: 



12/02/2018



HISTORY:

 intubaton 



COMPARISON:

Comparison made with chest radiograph 12/01/2018 at 00:00 hours 



FINDINGS:

Situ ETT tip which lies approximately 5.4 cm above errol. 



LUNGS:

No active pulmonary disease.



PLEURA:

No significant pleural effusion identified, no pneumothorax apparent.



CARDIOVASCULAR:

Suspect mild aortic atherosclerotic calcification present.



Normal cardiac size. No pulmonary vascular congestion. 



OSSEOUS STRUCTURES:

No significant abnormalities.



VISUALIZED UPPER ABDOMEN:

Normal.



OTHER FINDINGS:

None.



IMPRESSION:

ETT as described. No acute infiltrates

## 2018-12-02 NOTE — CP.PCM.PN
Subjective





- Date & Time of Evaluation


Date of Evaluation: 12/02/18


Time of Evaluation: 02:17





- Subjective


Subjective: 





Delon Lee D.O. PGY-3, Night Officer On Duty





Intern Dr. Leslie received page that patient had fever of 101F. We had received 

sign out that patient had RRT earlier for tachypnea which he has been having 

along with agitation and that he may require NGT placement for medication 

administration. Accompanied Dr. Leslei to bedside. We proceeded to insert a NGT 

after administration of cetacaine spray. Nurse at bedside. Patient definitely 

agitated and tachypneic. 





NGT insertion difficult as patient altered and cannot swallow. CXR ordered. 

While waiting for xray patient more agitated but BP was 160 but sats difficult 

to measure via monitor, 80s, so respiratory therapy started respiratory 

treatment as he appeared to have some wheezing.





Once xray arrived and started setting up, RT calls out that patient gasping for 

air. Re-evaluation reveals no pulse and so Code Blue called.





CPR started immediately. ACLS protocol was followed and able to obtain ROSC 

after a few rounds of epinephrine. Patient subsequently intubated by me. Shortly

after intubation patient once again has loss of pulses and patient coded again. 

ACLS protocol followed. ROSC again obtained and patient moved to ICU.





Shortly upon arrival to ICU patient once again coded. ACLS protocol again 

initiated and able to obtain ROSC. Call placed to family without response. Given

emergent nature, right femoral central line placed by me. Patient started on 

dobutamine drip, however pressures unstable. Levophed drip started and pressures

maintained low. Vasopressin added with mild improvement. 





Repeat CXR shows good placement of ET tube. 





Quick bedside US evaluation shows tachycardia but no notable pericardial 

effusion, negative Novoa's sign, no pneumothorax/no lung point, and very 

large abdominal aorta noted. IVC not collapsing with inspiration. 





Call placed to Dr. Tolentino, case discussed and updates given. Placing repeat 

call out to family. Repeat labs ordered. Workup underway. 





Discussed and reviewed case and management with Dr. Kiley butler who was 

present for majority of above. 





Objective





- Vital Signs/Intake and Output


Vital Signs (last 24 hours): 


                                        











Temp Pulse Resp BP Pulse Ox


 


 101.4 F H  122 H  20   66/46 L  95 


 


 12/01/18 22:39  12/02/18 01:43  12/01/18 22:39  12/02/18 01:58  12/01/18 22:39








Intake and Output: 


                                        











 12/01/18 12/02/18





 18:59 06:59


 


Intake Total 600 


 


Balance 600 














- Medications


Medications: 


                               Current Medications





Acetaminophen (Tylenol 325mg Tab)  650 mg PO Q6H PRN


   PRN Reason: Fever >100.4 F


Acetaminophen (Tylenol 325 Mg Supp)  325 mg RC Q6H PRN


   PRN Reason: Fever >100.4 F


   Last Admin: 12/01/18 22:06 Dose:  325 mg


Albuterol/Ipratropium (Duoneb 3 Mg/0.5 Mg (3 Ml) Ud)  3 ml IH Q2H PRN


   PRN Reason: Shortness of Breath


   Last Admin: 12/01/18 23:46 Dose:  3 ml


Albuterol/Ipratropium (Duoneb 3 Mg/0.5 Mg (3 Ml) Ud)  3 ml IH [id number] [facility]


   Last Admin: 12/01/18 21:01 Dose:  3 ml


Amlodipine Besylate (Norvasc)  10 mg PO DAILY [facility]


   Last Admin: 12/01/18 10:29 Dose:  10 mg


Aspirin (Ecotrin)  81 mg PO DAILY [facility]


   Last Admin: 12/01/18 10:32 Dose:  81 mg


Atorvastatin Calcium (Lipitor)  40 mg PO DAILY [facility]


   Last Admin: 12/01/18 10:30 Dose:  40 mg


Brimonidine Tartrate (Alphagan P 0.15% Opht)  1 drop OS Q8H [facility]


   Last Admin: 12/01/18 21:11 Dose:  1 drop


Cholecalciferol (Vitamin D)  2,000 intlu PO DAILY [facility]


   Last Admin: 12/01/18 10:40 Dose:  2,000 intlu


Clonidine HCl (Catapres-Tts2 0.2 Mg/24 Hr)  1 patch TD Q7D@1000 [facility]


   Last Admin: 11/28/18 17:17 Dose:  1 patch


Diltiazem HCl (Cardizem)  30 mg PO QID [facility]


   Last Admin: 12/01/18 22:45 Dose:  Not Given


Enalaprilat (Vasotec Iv)  1.25 mg IVP Q6 [facility]


   Last Admin: 12/01/18 23:14 Dose:  1.25 mg


Home Med (Home Med)  0 unit OS TID [facility]


   Last Admin: 12/01/18 17:24 Dose:  1 unit


Hydralazine HCl (Apresoline)  10 mg IVP Q6 PRN


   PRN Reason: SBP > 160


   Last Admin: 12/01/18 10:26 Dose:  10 mg


Vancomycin HCl (Vancomycin 1gm)  1 gm in 250 mls @ 167 mls/hr IVPB Q12H [facility]; 

Protocol


   Last Admin: 12/02/18 01:46 Dose:  167 mls/hr


Piperacillin Sod/Tazobactam Sod (Zosyn 3.375 In Ns 100ml)  100 mls @ 25 mls/hr 

IVPB Q8H [facility]; Protocol


   Last Admin: 12/01/18 23:15 Dose:  25 mls/hr


Dopamine HCl/Dextrose (Dopamine 400mg/250ml D5w)  400 mg in 250 mls @ 15.811 

mls/hr IV .[id number] [facility]; Protocol


   PRN Reason: TITRATE PER MD ORDER


   Last Admin: 12/02/18 01:43 Dose:  5 mcg/kg/min, 15.811 mls/hr


NOREPINEPHRINE BIT/0.9 % NACL (Levophed 4 Mg/ 250 Ml Ns Premixed)  4 mg in 250 

mls @ 15 mls/hr IV .B09G48O [facility]N; Protocol


   PRN Reason: TITRATE PER MD ORDER


   Last Admin: 12/02/18 01:45 Dose:  4 mcg/min, 15 mls/hr


Sodium Chloride (Sodium Chloride 0.9%)  1,000 mls @ 100 mls/hr IV .Q10H [facility]


Vasopressin 20 units/ Dextrose  101 mls @ 9.09 mls/hr IV .Q11H7M [facility]; Protocol


   Last Admin: 12/02/18 01:58 Dose:  9.09 mls/hr


Levothyroxine Sodium (Synthroid)  125 mcg PO DAILY [facility]


   Last Admin: 11/28/18 09:38 Dose:  Not Given


Levothyroxine Sodium (Synthroid)  60 mcg IVP DAILY [facility]


   Last Admin: 12/01/18 10:33 Dose:  60 mcg


Lisinopril (Zestril)  20 mg PO DAILY [facility]


   Last Admin: 11/27/18 09:09 Dose:  Not Given


Lorazepam (Ativan)  0.5 mg IVP Q6H [facility]; Protocol


   PRN Reason: Anxiety/Agitation


   Last Admin: 12/01/18 12:51 Dose:  0.5 mg


Metoprolol Succinate (Toprol Xl)  50 mg PO DAILY [facility]


   Last Admin: 11/27/18 09:07 Dose:  Not Given


Nystatin (Nystop Topical Powder)  0 gm TOP Q8H [facility]


   Last Admin: 12/01/18 21:11 Dose:  1 applic


Pantoprazole Sodium (Protonix Inj)  40 mg IVP DAILY [facility]


   Last Admin: 12/01/18 10:41 Dose:  40 mg


Prednisolone Acetate (Pred Forte 1% Opht Susp)  0 ml OS DAILY [facility]


   Last Admin: 12/01/18 10:41 Dose:  1 drop


Risperidone (Risperdal Oral Soln)  0.5 mg PO HS [facility]; Protocol


   Last Admin: 12/01/18 22:46 Dose:  Not Given


Thiamine HCl (Vitamin B1 Tab)  100 mg PO BID [facility]


   Last Admin: 12/01/18 19:30 Dose:  Not Given


Ziprasidone (Geodon Inj)  10 mg IM Q12 PRN; Protocol


   PRN Reason: severe agitaiton/psychosis


   Last Admin: 11/30/18 21:54 Dose:  10 mg











- Labs


Labs: 


                                        





                                 12/02/18 01:00 





                                 12/02/18 01:00 





                                        











PT  14.2 SECONDS (9.4-12.5)  H  11/27/18  05:15    


 


INR  1.23   11/27/18  05:15    


 


APTT  32.3 Seconds (25.1-36.5)   11/26/18  18:56

## 2018-12-02 NOTE — PCM.PROC
<Delon Lee - Last Filed: 12/02/18 08:19>





Procedures


Attestation:: I certify that I have explained the specified Operation(s) or 

Procedure(s), risks, benefits and reasonable alternatives to the Patient and/or 

other person responsible. The opportunity was given to ask questions and all 

questions answered





- Arterial Line


  ** Right Femoral


Aseptic technique was employed throughout the procedure: Hand Hygiene done prior

to procedure, Full sterile barriers (mask, hair cover, sterile gown, sterile 

gloves), Full body sterile drape, Chloraprep Antiseptic: 2 minute prep for 

Femoral


Time Out Performed: Yes


Pt. placed on Pulse Ox Monitor: Yes


Central Line Prep: Chlorhexidine-Alcohol Combination


Ultrasound Used for Placement: No


Gauge (Size): 20 gauge


Technique Used: Guide Wire Technique


Secured by: Suture


Post procedure dressing: Clear vapor permeable, Chlorhexidine disc (Biopatch)


Patient Tolerated Procedure: well


Immediate Complications: none





<Vincent Bond - Last Filed: 12/02/18 19:05>





Addendum


Addendum: 





12/02/18 19:04


MIC[facility] Attending:





I was present for procedure. agree with above


Vincent Bond MD

## 2018-12-02 NOTE — PCM.PROC
Procedures


Attestation:: I certify that I have explained the specified Operation(s) or 

Procedure(s), risks, benefits and reasonable alternatives to the Patient and/or 

other person responsible. The opportunity was given to ask questions and all 

questions answered





- Central Line Placement


  ** Right Femoral Triple Lumen Catheter


Aseptic technique was employed throughout the procedure: Hand Hygiene done prior

to procedure, Full sterile barriers (mask, hair cover, sterile gown, sterile 

gloves), Full body sterile drape, Chloraprep Antiseptic: 2 minute prep for 

Femoral


CVP Time Out Performed: Yes


Pt. Placed on Pulse Ox Monitor: Yes


Central Line Prep: Chlorhexidine-Alcohol Combination


Ultrasound Used for Placement: Yes


Central Line Lumen Inserted: triple


Central Line Length: 20 cm


Post Procedure: Sutured in Place, Good Blood Return, All Ports Aspirated, 

Flushed, Capped, Sterile Dressing Applied


Secured by: Securement device


Post procedure dressing: Clear vapor permeable, Chlorhexidine disc (Biopatch)


Post Procedure X-Ray: No


Patient Tolerated Procedure: Well, No Complications


Immediate Complications: None

## 2018-12-02 NOTE — RAD
Date of service: 



12/02/2018



HISTORY:

Intubated; hypoxic 



COMPARISON:

Comparison chest 12.  2.18 at 01:01hrs. 



FINDINGS:

Situ tip which lies 9.3 cm above errol. 



LUNGS:

There appears to be some mild left apical linear scarring and apical 

pleural thickening.  No acute consolidation 



PLEURA:

No significant pleural effusion identified, no pneumothorax apparent.



CARDIOVASCULAR:

No aortic atherosclerotic calcification present.



Normal cardiac size. No pulmonary vascular congestion. 



OSSEOUS STRUCTURES:

No significant abnormalities.



VISUALIZED UPPER ABDOMEN:

Normal.



OTHER FINDINGS:

None.



IMPRESSION:

ETT as above. 



There appears to be some mild left apical linear scarring and apical 

pleural thickening.  No acute consolidation

## 2018-12-02 NOTE — CP.PCM.CON
<Segundo Perez - Last Filed: 12/02/18 01:16>





History of Present Illness





- History of Present Illness


History of Present Illness: 


ICU Consult Note 





HPI: 82 year old male with past medical history of HTN, COPD, hx of small 

bilateral subdural hematoma secondary to trauma from fall without neurosurgical 

intervention(4/2018), hypothyroidism, HLD, CAD s/p cardiac stent 2003 who 

[place] [facility] [place] altered [place] status/agitation. 

Patient was getting NG tube placement when pulse was not elicited and rapid 

response was called. Patient had asystole and then achieved ROSC was intubated 

and brought down to the ICU. Patient then had 2 subsequent code blued called in 

the ICU and achieved ROSC both times. Unable to obtain ROS due to patients 

status. 








PMH: HTN, HLD, COPD, Hx SDH, Hypothyroidism, Hearing impairment of the right ear

and loss of right eye secondary to  service


PSH: Cardiac stent 2003, Cataract surgery, 


SOCHx: Tobacco: Former EtOH: Denies, ID: Denies 


FMH: Unable to obtain 


ALL: NKDA


MEDS: MAR 








Review of Systems





- Review of Systems


Review of Systems: 


intubated, unable to obtain 








Past Patient History





- Infectious Disease


Hx of Infectious Diseases: None





- Tetanus Immunizations


Tetanus Immunization: Up to Date





- Past Social History


Smoking Status: Former Smoker


Alcohol: None


Drugs: Denies





- CARDIAC


Hx Hypertension: Yes





- PULMONARY


Hx Chronic Obstructive Pulmonary Disease (COPD): Yes





- NEUROLOGICAL


Hx Neurological Disorder: Yes





- HEENT


Hx HEENT Problems: Yes


Hx Blind: Yes (RIGHT EYE)


Hx Cataracts: Yes (LEFT EYE)


Hx Deafness: Yes (RIGHT EAR)





- RENAL


Hx Chronic Kidney Disease: No





- HEMATOLOGICAL/ONCOLOGICAL


Hx Blood Disorders: No





- INTEGUMENTARY


Hx Dermatological Problems: No





- MUSCULOSKELETAL/RHEUMATOLOGICAL


Hx Musculoskeletal Disorders: No


Hx Falls: Yes





- GASTROINTESTINAL


Hx Gastrointestinal Disorders: No





- GENITOURINARY/GYNECOLOGICAL


Hx Genitourinary Disorders: No





- PSYCHIATRIC


Hx Depression: No


Hx Emotional Abuse: No


Hx Physical Abuse: No


Hx Substance Use: No





- SURGICAL HISTORY


Hx Surgeries: Yes (CARDIAC STENT 2003, R EYE SX, AP, T&A)





- ANESTHESIA


Hx Anesthesia: Yes


Hx Anesthesia Reactions: No


Hx Malignant Hyperthermia: No





Meds


Allergies/Adverse Reactions: 


                                    Allergies











Allergy/AdvReac Type Severity Reaction Status Date / Time


 


No Known Allergies Allergy   Verified 04/28/18 15:33














- Medications


Medications: 


                               Current Medications





Acetaminophen (Tylenol 325mg Tab)  650 mg PO Q6H PRN


   PRN Reason: Fever >100.4 F


Acetaminophen (Tylenol 325 Mg Supp)  325 mg RC Q6H PRN


   PRN Reason: Fever >100.4 F


   Last Admin: 12/01/18 22:06 Dose:  325 mg


Albuterol/Ipratropium (Duoneb 3 Mg/0.5 Mg (3 Ml) Ud)  3 ml IH Q2H PRN


   PRN Reason: Shortness of Breath


   Last Admin: 12/01/18 23:46 Dose:  3 ml


Albuterol/Ipratropium (Duoneb 3 Mg/0.5 Mg (3 Ml) Ud)  3 ml IH [id number] [facility]


   Last Admin: 12/01/18 21:01 Dose:  3 ml


Amlodipine Besylate (Norvasc)  10 mg PO DAILY [facility]


   Last Admin: 12/01/18 10:29 Dose:  10 mg


Aspirin (Ecotrin)  81 mg PO DAILY [facility]


   Last Admin: 12/01/18 10:32 Dose:  81 mg


Atorvastatin Calcium (Lipitor)  40 mg PO DAILY [facility]


   Last Admin: 12/01/18 10:30 Dose:  40 mg


Brimonidine Tartrate (Alphagan P 0.15% Opht)  1 drop OS Q8H [facility]


   Last Admin: 12/01/18 21:11 Dose:  1 drop


Cholecalciferol (Vitamin D)  2,000 intlu PO DAILY [facility]


   Last Admin: 12/01/18 10:40 Dose:  2,000 intlu


Clonidine HCl (Catapres-Tts2 0.2 Mg/24 Hr)  1 patch TD Q7D@1000 [facility]


   Last Admin: 11/28/18 17:17 Dose:  1 patch


Diltiazem HCl (Cardizem)  30 mg PO QID [facility]


   Last Admin: 12/01/18 22:45 Dose:  Not Given


Enalaprilat (Vasotec Iv)  1.25 mg IVP Q6 [facility]


   Last Admin: 12/01/18 23:14 Dose:  1.25 mg


Home Med (Home Med)  0 unit OS TID [facility]


   Last Admin: 12/01/18 17:24 Dose:  1 unit


Hydralazine HCl (Apresoline)  10 mg IVP Q6 PRN


   PRN Reason: SBP > 160


   Last Admin: 12/01/18 10:26 Dose:  10 mg


Sodium Chloride (Sodium Chloride 0.9%)  1,000 mls @ 50 mls/hr IV .Q20H [facility]


   Last Admin: 12/01/18 13:15 Dose:  50 mls/hr


Vancomycin HCl (Vancomycin 1gm)  1 gm in 250 mls @ 167 mls/hr IVPB Q12H [facility]; 

Protocol


   Last Admin: 12/01/18 13:12 Dose:  167 mls/hr


Piperacillin Sod/Tazobactam Sod (Zosyn 3.375 In Ns 100ml)  100 mls @ 25 mls/hr 

IVPB Q8H [facility]; Protocol


   Last Admin: 12/01/18 23:15 Dose:  25 mls/hr


Levothyroxine Sodium (Synthroid)  125 mcg PO DAILY [facility]


   Last Admin: 11/28/18 09:38 Dose:  Not Given


Levothyroxine Sodium (Synthroid)  60 mcg IVP DAILY [facility]


   Last Admin: 12/01/18 10:33 Dose:  60 mcg


Lisinopril (Zestril)  20 mg PO DAILY [facility]


   Last Admin: 11/27/18 09:09 Dose:  Not Given


Lorazepam (Ativan)  0.5 mg IVP Q6H PRN; Protocol


   PRN Reason: Anxiety/Agitation


   Last Admin: 12/01/18 12:51 Dose:  0.5 mg


Metoprolol Succinate (Toprol Xl)  50 mg PO DAILY [facility]


   Last Admin: 11/27/18 09:07 Dose:  Not Given


Nystatin (Nystop Topical Powder)  0 gm TOP Q8H [facility]


   Last Admin: 12/01/18 21:11 Dose:  1 applic


Pantoprazole Sodium (Protonix Inj)  40 mg IVP DAILY [facility]


   Last Admin: 12/01/18 10:41 Dose:  40 mg


Prednisolone Acetate (Pred Forte 1% Opht Susp)  0 ml OS DAILY [facility]


   Last Admin: 12/01/18 10:41 Dose:  1 drop


Risperidone (Risperdal Oral Soln)  0.5 mg PO HS [facility]; Protocol


   Last Admin: 12/01/18 22:46 Dose:  Not Given


Thiamine HCl (Vitamin B1 Tab)  100 mg PO BID [facility]


   Last Admin: 12/01/18 19:30 Dose:  Not Given


Ziprasidone (Geodon Inj)  10 mg IM Q12 PRN; Protocol


   PRN Reason: severe agitaiton/psychosis


   Last Admin: 11/30/18 21:54 Dose:  10 mg











Physical Exam





- Constitutional


Appears: Toxic, In Acute Distress





- Head Exam


Head Exam: ATRAUMATIC, NORMAL INSPECTION, NORMOCEPHALIC





- Eye Exam


Eye Exam: PERRL





- ENT Exam


ENT Exam: Mucous Membranes Moist





- Respiratory Exam


Respiratory Exam: NORMAL BREATHING PATTERN





- Cardiovascular Exam


Cardiovascular Exam: REGULAR RHYTHM, +S1, +S2





- GI/Abdominal Exam


GI & Abdominal Exam: Pulsatile Mass, Soft





- Extremities Exam


Extremities exam: Positive for: pedal pulses present.  Negative for: pedal edema





- Neurological Exam


Neurological exam: Altered





Results





- Vital Signs


Recent Vital Signs: 


                                Last Vital Signs











Temp  101.4 F H  12/01/18 22:39


 


Pulse  105 H  12/01/18 22:45


 


Resp  20   12/01/18 22:39


 


BP  159/62 H  12/01/18 23:14


 


Pulse Ox  95   12/01/18 22:39














- Labs


Result Diagrams: 


                                 12/01/18 09:20





                                 12/01/18 09:20


Labs: 


                         Laboratory Results - last 24 hr











  12/01/18 12/01/18 12/01/18





  06:00 08:06 08:15


 


WBC   


 


RBC   


 


Hgb   


 


Hct   


 


MCV   


 


MCH   


 


MCHC   


 


RDW   


 


Plt Count   


 


MPV   


 


Gran %   


 


Lymph % (Auto)   


 


Mono % (Auto)   


 


Eos % (Auto)   


 


Baso % (Auto)   


 


Gran #   


 


Lymph # (Auto)   


 


Mono # (Auto)   


 


Eos # (Auto)   


 


Baso # (Auto)   


 


pCO2    33 L


 


pO2    82.0


 


HCO3    20.9 L


 


ABG pH    7.41


 


ABG Total CO2    21.9 L


 


ABG O2 Saturation    98.1 H


 


ABG O2 Content    17.2


 


ABG Base Excess    -3.0 L


 


ABG Hemoglobin    12.7


 


ABG Carboxyhemoglobin    2.0 H


 


POC ABG HHb (Measured)    1.9


 


ABG Methemoglobin    0.3


 


ABG O2 Capacity    17.5


 


Hgb O2 Saturation    95.7


 


FiO2    32.0


 


Sodium   


 


Potassium   


 


Chloride   


 


Carbon Dioxide   


 


Anion Gap   


 


BUN   


 


Creatinine   


 


Est GFR ( Amer)   


 


Est GFR (Non-Af Amer)   


 


POC Glucose (mg/dL)   104 


 


Random Glucose   


 


Calcium   


 


Phosphorus   


 


Magnesium   


 


Total Bilirubin   


 


AST   


 


ALT   


 


Alkaline Phosphatase   


 


Total Protein   


 


Albumin   


 


Globulin   


 


Albumin/Globulin Ratio   


 


TSH 3rd Generation  3.80  














  12/01/18 12/01/18 12/01/18





  09:20 09:20 18:25


 


WBC  15.9 H D  


 


RBC  4.47  


 


Hgb  14.0  


 


Hct  43.1  


 


MCV  96.4  


 


MCH  31.3  


 


MCHC  32.5  


 


RDW  14.6 H  


 


Plt Count  274  


 


MPV  9.8  


 


Gran %  82.1 H  


 


Lymph % (Auto)  8.2 L  


 


Mono % (Auto)  9.1 H  


 


Eos % (Auto)  0.4 L  


 


Baso % (Auto)  0.2  


 


Gran #  13.08 H  


 


Lymph # (Auto)  1.3  


 


Mono # (Auto)  1.5 H  


 


Eos # (Auto)  0.1  


 


Baso # (Auto)  0.03  


 


pCO2    36


 


pO2    139.0 H


 


HCO3    22.3


 


ABG pH    7.40


 


ABG Total CO2    23.4


 


ABG O2 Saturation    99.3 H


 


ABG O2 Content    18.0


 


ABG Base Excess    -2.0


 


ABG Hemoglobin    13.1


 


ABG Carboxyhemoglobin    1.8 H


 


POC ABG HHb (Measured)    0.7


 


ABG Methemoglobin    0.9


 


ABG O2 Capacity    18.1


 


Hgb O2 Saturation    96.6


 


FiO2    40.0


 


Sodium   153 H 


 


Potassium   3.8 


 


Chloride   119 H 


 


Carbon Dioxide   23 


 


Anion Gap   15 


 


BUN   18 


 


Creatinine   0.9 


 


Est GFR ( Amer)   > 60 


 


Est GFR (Non-Af Amer)   > 60 


 


POC Glucose (mg/dL)   


 


Random Glucose   126 H 


 


Calcium   9.0 


 


Phosphorus   3.0 


 


Magnesium   2.2 


 


Total Bilirubin   1.4 H 


 


AST   33 


 


ALT   38 


 


Alkaline Phosphatase   84 


 


Total Protein   7.0 


 


Albumin   3.8 


 


Globulin   3.2 


 


Albumin/Globulin Ratio   1.2 


 


TSH 3rd Generation   














Assessment & Plan





- Assessment and Plan (Free Text)


Assessment: 


82 year old male with past medical history of HTN, COPD, hx of small bilateral 

subdural hematoma secondary to trauma from fall without neurosurgical 

intervention(4/2018), hypothyroidism, HLD, CAD s/p cardiac stent 2003 who pr

[place] [facility] [place] altered [place] status/agitation. 

Patient was getting NG tube placement when pulse was not elicited and rapid 

response was called. Code blue was called 2 times in the ICU, patient is 

intubated. 





Plan: 


Neurology: 


- Altered mental Status


- Etiology: Sepsis vs. encephalitis vs. medication vs. delirium 


- Head CT: no acute intracranial abnormalities


- Neurology consulted, follow recs 


- ID Consulted 


- Maintain euglycemia





Cardiology: 


- s/p code blue achieved ROSC three times


- Hx CAD, HTN, HLD, Infrarenal AAA 5.4cm 


- femora line placement 


- levophed, dopamine 


- troponin 


- Maintain MAP >65


- Echocardiogram(5/2018): EF 55-60%, mild AS, Aortic root is borderline 

enlarged, no vegetation or thrombus noted


- daily labs





Pulmonology:


- COPD, hx Emphysema


- intubated on the ventilator 


- Continue home meds


- will obtain ABG 


- CXR: 


- daily chest xrays 





GI: 


- Abd/Pelvis CT without IV contrast showing hepatomegaly, multiple low-

attenuation lesions in the renal cortex likely representing benign cysts, large 

hiatus hernia, infrarenal abdominal aortic aneurysm measuring 5.4cm


- LFTs wnl


- Protonix 


- NPO diet except meds





Infectious Disease: 


- Leukocytosis, afebrile 


- antibiotics as per ID


- ID consulted, appreciate recs


- blood cultures, urine cultures ordered





Psych: 


- AMS


- continue to monitor for agitation





PPX


- lovenox 


- SCD








<Whitney Cao - Last Filed: 12/02/18 02:09>





Meds





- Medications


Medications: 


                               Current Medications





Acetaminophen (Tylenol 325mg Tab)  650 mg PO Q6H PRN


   PRN Reason: Fever >100.4 F


Acetaminophen (Tylenol 325 Mg Supp)  325 mg RC Q6H PRN


   PRN Reason: Fever >100.4 F


   Last Admin: 12/01/18 22:06 Dose:  325 mg


Albuterol/Ipratropium (Duoneb 3 Mg/0.5 Mg (3 Ml) Ud)  3 ml IH Q2H PRN


   PRN Reason: Shortness of Breath


   Last Admin: 12/01/18 23:46 Dose:  3 ml


Albuterol/Ipratropium (Duoneb 3 Mg/0.5 Mg (3 Ml) Ud)  3 ml IH [id number] [facility]


   Last Admin: 12/01/18 21:01 Dose:  3 ml


Amlodipine Besylate (Norvasc)  10 mg PO DAILY [facility]


   Last Admin: 12/01/18 10:29 Dose:  10 mg


Aspirin (Ecotrin)  81 mg PO DAILY [facility]


   Last Admin: 12/01/18 10:32 Dose:  81 mg


Atorvastatin Calcium (Lipitor)  40 mg PO DAILY [facility]


   Last Admin: 12/01/18 10:30 Dose:  40 mg


Brimonidine Tartrate (Alphagan P 0.15% Opht)  1 drop OS Q8H [facility]


   Last Admin: 12/01/18 21:11 Dose:  1 drop


Cholecalciferol (Vitamin D)  2,000 intlu PO DAILY [facility]


   Last Admin: 12/01/18 10:40 Dose:  2,000 intlu


Clonidine HCl (Catapres-Tts2 0.2 Mg/24 Hr)  1 patch TD Q7D@1000 [facility]


   Last Admin: 11/28/18 17:17 Dose:  1 patch


Diltiazem HCl (Cardizem)  30 mg PO QID [facility]


   Last Admin: 12/01/18 22:45 Dose:  Not Given


Enalaprilat (Vasotec Iv)  1.25 mg IVP Q6 [facility]


   Last Admin: 12/01/18 23:14 Dose:  1.25 mg


Home Med (Home Med)  0 unit OS TID [facility]


   Last Admin: 12/01/18 17:24 Dose:  1 unit


Hydralazine HCl (Apresoline)  10 mg IVP Q6 PRN


   PRN Reason: SBP > 160


   Last Admin: 12/01/18 10:26 Dose:  10 mg


Vancomycin HCl (Vancomycin 1gm)  1 gm in 250 mls @ 167 mls/hr IVPB Q12H [facility]; P

rotocol


   Last Admin: 12/02/18 01:46 Dose:  167 mls/hr


Piperacillin Sod/Tazobactam Sod (Zosyn 3.375 In Ns 100ml)  100 mls @ 25 mls/hr 

IVPB Q8H [facility]; Protocol


   Last Admin: 12/01/18 23:15 Dose:  25 mls/hr


Dopamine HCl/Dextrose (Dopamine 400mg/250ml D5w)  400 mg in 250 mls @ 15.811 

mls/hr IV .[id number] PRN; Protocol


   PRN Reason: TITRATE PER MD ORDER


   Last Admin: 12/02/18 01:43 Dose:  5 mcg/kg/min, 15.811 mls/hr


NOREPINEPHRINE BIT/0.9 % NACL (Levophed 4 Mg/ 250 Ml Ns Premixed)  4 mg in 250 

mls @ 15 mls/hr IV .[id number] PRN; Protocol


   PRN Reason: TITRATE PER MD ORDER


   Last Admin: 12/02/18 01:45 Dose:  4 mcg/min, 15 mls/hr


Sodium Chloride (Sodium Chloride 0.9%)  1,000 mls @ 100 mls/hr IV .Q10H [facility]


Vasopressin 20 units/ Dextrose  101 mls @ 9.09 mls/hr IV .Q11H7M [facility]; Protocol


   Last Admin: 12/02/18 01:58 Dose:  9.09 mls/hr


Levothyroxine Sodium (Synthroid)  125 mcg PO DAILY [facility]


   Last Admin: 11/28/18 09:38 Dose:  Not Given


Levothyroxine Sodium (Synthroid)  60 mcg IVP DAILY [facility]


   Last Admin: 12/01/18 10:33 Dose:  60 mcg


Lisinopril (Zestril)  20 mg PO DAILY [facility]


   Last Admin: 11/27/18 09:09 Dose:  Not Given


Lorazepam (Ativan)  0.5 mg IVP Q6H PRN; Protocol


   PRN Reason: Anxiety/Agitation


   Last Admin: 12/01/18 12:51 Dose:  0.5 mg


Metoprolol Succinate (Toprol Xl)  50 mg PO DAILY [facility]


   Last Admin: 11/27/18 09:07 Dose:  Not Given


Nystatin (Nystop Topical Powder)  0 gm TOP Q8H [facility]


   Last Admin: 12/01/18 21:11 Dose:  1 applic


Pantoprazole Sodium (Protonix Inj)  40 mg IVP DAILY [facility]


   Last Admin: 12/01/18 10:41 Dose:  40 mg


Prednisolone Acetate (Pred Forte 1% Opht Susp)  0 ml OS DAILY [facility]


   Last Admin: 12/01/18 10:41 Dose:  1 drop


Risperidone (Risperdal Oral Soln)  0.5 mg PO HS [facility]; Protocol


   Last Admin: 12/01/18 22:46 Dose:  Not Given


Thiamine HCl (Vitamin B1 Tab)  100 mg PO BID [facility]


   Last Admin: 12/01/18 19:30 Dose:  Not Given


Ziprasidone (Geodon Inj)  10 mg IM Q12 PRN; Protocol


   PRN Reason: severe agitaiton/psychosis


   Last Admin: 11/30/18 21:54 Dose:  10 mg











Results





- Vital Signs


Recent Vital Signs: 


                                Last Vital Signs











Temp  101.4 F H  12/01/18 22:39


 


Pulse  122 H  12/02/18 01:43


 


Resp  20   12/01/18 22:39


 


BP  66/46 L  12/02/18 01:58


 


Pulse Ox  95   12/01/18 22:39














- Labs


Result Diagrams: 


                                 12/02/18 01:00





                                 12/02/18 01:00


Labs: 


                         Laboratory Results - last 24 hr











  12/01/18 12/01/18 12/01/18





  06:00 08:06 08:15


 


WBC   


 


RBC   


 


Hgb   


 


Hct   


 


MCV   


 


MCH   


 


MCHC   


 


RDW   


 


Plt Count   


 


MPV   


 


Gran %   


 


Lymph % (Auto)   


 


Mono % (Auto)   


 


Eos % (Auto)   


 


Baso % (Auto)   


 


Gran #   


 


Lymph # (Auto)   


 


Mono # (Auto)   


 


Eos # (Auto)   


 


Baso # (Auto)   


 


pCO2    33 L


 


pO2    82.0


 


HCO3    20.9 L


 


ABG pH    7.41


 


ABG Total CO2    21.9 L


 


ABG O2 Saturation    98.1 H


 


ABG O2 Content    17.2


 


ABG Base Excess    -3.0 L


 


ABG Hemoglobin    12.7


 


ABG Carboxyhemoglobin    2.0 H


 


POC ABG HHb (Measured)    1.9


 


ABG Methemoglobin    0.3


 


ABG O2 Capacity    17.5


 


Hgb O2 Saturation    95.7


 


FiO2    32.0


 


Sodium   


 


Potassium   


 


Chloride   


 


Carbon Dioxide   


 


Anion Gap   


 


BUN   


 


Creatinine   


 


Est GFR ( Amer)   


 


Est GFR (Non-Af Amer)   


 


POC Glucose (mg/dL)   104 


 


Random Glucose   


 


Calcium   


 


Phosphorus   


 


Magnesium   


 


Total Bilirubin   


 


AST   


 


ALT   


 


Alkaline Phosphatase   


 


Troponin I   


 


Total Protein   


 


Albumin   


 


Globulin   


 


Albumin/Globulin Ratio   


 


TSH 3rd Generation  3.80  














  12/01/18 12/01/18 12/01/18





  09:20 09:20 18:25


 


WBC  15.9 H D  


 


RBC  4.47  


 


Hgb  14.0  


 


Hct  43.1  


 


MCV  96.4  


 


MCH  31.3  


 


MCHC  32.5  


 


RDW  14.6 H  


 


Plt Count  274  


 


MPV  9.8  


 


Gran %  82.1 H  


 


Lymph % (Auto)  8.2 L  


 


Mono % (Auto)  9.1 H  


 


Eos % (Auto)  0.4 L  


 


Baso % (Auto)  0.2  


 


Gran #  13.08 H  


 


Lymph # (Auto)  1.3  


 


Mono # (Auto)  1.5 H  


 


Eos # (Auto)  0.1  


 


Baso # (Auto)  0.03  


 


pCO2    36


 


pO2    139.0 H


 


HCO3    22.3


 


ABG pH    7.40


 


ABG Total CO2    23.4


 


ABG O2 Saturation    99.3 H


 


ABG O2 Content    18.0


 


ABG Base Excess    -2.0


 


ABG Hemoglobin    13.1


 


ABG Carboxyhemoglobin    1.8 H


 


POC ABG HHb (Measured)    0.7


 


ABG Methemoglobin    0.9


 


ABG O2 Capacity    18.1


 


Hgb O2 Saturation    96.6


 


FiO2    40.0


 


Sodium   153 H 


 


Potassium   3.8 


 


Chloride   119 H 


 


Carbon Dioxide   23 


 


Anion Gap   15 


 


BUN   18 


 


Creatinine   0.9 


 


Est GFR ( Amer)   > 60 


 


Est GFR (Non-Af Amer)   > 60 


 


POC Glucose (mg/dL)   


 


Random Glucose   126 H 


 


Calcium   9.0 


 


Phosphorus   3.0 


 


Magnesium   2.2 


 


Total Bilirubin   1.4 H 


 


AST   33 


 


ALT   38 


 


Alkaline Phosphatase   84 


 


Troponin I   


 


Total Protein   7.0 


 


Albumin   3.8 


 


Globulin   3.2 


 


Albumin/Globulin Ratio   1.2 


 


TSH 3rd Generation   














  12/02/18 12/02/18 12/02/18





  01:00 01:00 01:00


 


WBC  24.3 H D  


 


RBC  4.16  


 


Hgb  13.0 L  


 


Hct  42.4  


 


MCV  101.9  D  


 


MCH  31.3  


 


MCHC  30.7 L  


 


RDW  14.5  


 


Plt Count  274  


 


MPV  10.1  


 


Gran %  65.8  


 


Lymph % (Auto)  27.2  


 


Mono % (Auto)  6.0  


 


Eos % (Auto)  0.5 L  


 


Baso % (Auto)  0.5  


 


Gran #  15.99 H  


 


Lymph # (Auto)  6.6 H  


 


Mono # (Auto)  1.5 H  


 


Eos # (Auto)  0.1  


 


Baso # (Auto)  0.11  


 


pCO2   


 


pO2   


 


HCO3   


 


ABG pH   


 


ABG Total CO2   


 


ABG O2 Saturation   


 


ABG O2 Content   


 


ABG Base Excess   


 


ABG Hemoglobin   


 


ABG Carboxyhemoglobin   


 


POC ABG HHb (Measured)   


 


ABG Methemoglobin   


 


ABG O2 Capacity   


 


Hgb O2 Saturation   


 


FiO2   


 


Sodium   154 H 


 


Potassium   4.2 


 


Chloride   122 H 


 


Carbon Dioxide   22 


 


Anion Gap   14 


 


BUN   19 


 


Creatinine   1.2 


 


Est GFR ( Amer)   > 60 


 


Est GFR (Non-Af Amer)   58 


 


POC Glucose (mg/dL)   


 


Random Glucose   111 H 


 


Calcium   8.6 


 


Phosphorus    5.1 H


 


Magnesium    2.5 H


 


Total Bilirubin   1.3 


 


AST   151 H D 


 


ALT   149 H 


 


Alkaline Phosphatase   78 


 


Troponin I    0.12  D


 


Total Protein   6.2 


 


Albumin   3.2 


 


Globulin   2.9 


 


Albumin/Globulin Ratio   1.1 


 


TSH 3rd Generation   














Assessment & Plan





- Assessment and Plan (Free Text)


Plan: 


cardiopulmonmary arrest x 3


ROSC achievd


R/O pulmonary embolus, MI, septic shock


troponin


ABG


2 D echo


CBC, CMP, procalcitonin, Mg and phos


EKG


on dopamine, levophed and vasopressin








Hypernatremia


on 1/2 NS


trend Na





rest of A/P as above

## 2018-12-02 NOTE — PN
DATE:  12/02/2018



SUBJECTIVE:  Events noted.  Overnight, he coded and required to currently

intubated and sedated.  Currently on amiodarone drip and pressor support

with dopamine _____ intubated and sedated.



PHYSICAL EXAMINATION:

GENERAL:  Intubated and sedated.

VITAL SIGNS:  Heart rate is 114 per minute, blood pressure 136/66, oxygen

saturation 94% on mechanical ventilation.

HEENT:  Pallor positive.

NECK:  No lymphadenopathy.

CHEST:  Air entry present bilateral.  Bilateral conducted sounds present.

ABDOMEN:  Soft, nontender, obese.

CARDIOVASCULAR:  S1 and S2 normal.

EXTREMITY:  1+ edema.

CENTRAL NERVOUS SYSTEM:  Sedated, intubated.

SKIN:  No petechiae.  No rash.



LABORATORY DATA:  White count 24,000, hemoglobin 13, hematocrit 42.4,

platelet 274.  Sodium 154, potassium 4.2, BUN 19, creatinine 1.2,

phosphorus 5.1, magnesium 2.5, , .



MEDICATIONS:  Tylenol 650 every 6 hours p.r.n., DuoNeb every 6 hours and

p.r.n., amiodarone drip, aspirin 81 mg daily, Lipitor 40 mg daily, vitamin

D, Precedex drip, dopamine drip, Synthroid 60 mcg IV daily, Ativan p.r.n.

for agitation, Levophed, Protonix, Zosyn, vancomycin, thiamine 100 mg p.o.

b.i.d., Geodon 10 mg IM every 12 hours p.r.n. for agitation.



ASSESSMENT:

1.  Cardiorespiratory failure.

2.  Chronic obstructive pulmonary disease, severe emphysema.

3.  Leukocytosis.

4.  Bilateral subdural hematoma.

5.  Hypothyroidism.



PLAN:  Discussed with the family at bedside, wife and four children.  They

made him DNR.  They want him to be comfortable.  We will continue current

medications, using pressors, maintaining good blood pressures, currently

intubated.  CT chest done today was negative for pulmonary embolism, severe

emphysematous changes.  Currently on IV antibiotics, ID following.  Zosyn

and vanco.  For agitation, he is on Geodon IM.  We will continue to follow

closely.  Synthroid is IV 60 mcg for hypothyroidism.  Prognosis is poor. 

Discussed with the staff nurse.  Discussed with the family at bedside. 

Discussed with the ICU team.







__________________________________________

Angela Warner MD



DD:  12/02/2018 11:55:25

DT:  12/02/2018 12:43:38

[facility] # [id number]

## 2018-12-03 VITALS — OXYGEN SATURATION: 99 % | HEART RATE: 28 BPM | SYSTOLIC BLOOD PRESSURE: 99 MMHG | DIASTOLIC BLOOD PRESSURE: 53 MMHG

## 2018-12-03 VITALS — TEMPERATURE: 103.1 F

## 2018-12-03 VITALS — RESPIRATION RATE: 23 BRPM

## 2018-12-03 LAB
ALBUMIN SERPL-MCNC: 2.5 G/DL (ref 3–4.8)
ALBUMIN/GLOB SERPL: 1 {RATIO} (ref 1.1–1.8)
ALT SERPL-CCNC: 393 U/L (ref 7–56)
ARTERIAL BLOOD GAS HEMOGLOBIN: 8.6 G/DL (ref 11.7–17.4)
ARTERIAL BLOOD GAS O2 SAT: 100.4 % (ref 95–98)
ARTERIAL BLOOD GAS PCO2: 27 MM/HG (ref 35–45)
ARTERIAL BLOOD GAS TCO2: 18.7 MMOL.L (ref 22–28)
AST SERPL-CCNC: 302 U/L (ref 17–59)
BUN SERPL-MCNC: 46 MG/DL (ref 7–21)
CALCIUM SERPL-MCNC: 7.6 MG/DL (ref 8.4–10.5)
ERYTHROCYTE [DISTWIDTH] IN BLOOD BY AUTOMATED COUNT: 14.7 % (ref 11.5–14.5)
GFR NON-AFRICAN AMERICAN: 11
HCO3 BLDA-SCNC: 17.9 MMOL/L (ref 21–28)
HGB BLD-MCNC: 11.5 G/DL (ref 14–18)
INHALED O2 CONCENTRATION: 30 %
MCH RBC QN AUTO: 30.6 PG (ref 25–35)
MCHC RBC AUTO-ENTMCNC: 31 G/DL (ref 31–37)
MCV RBC AUTO: 98.7 FL (ref 80–105)
O2 CAP BLDA-SCNC: 12 ML/DL (ref 16–24)
O2 CT BLDA-SCNC: 12 ML/DL (ref 15–23)
PH BLDA: 7.43 [PH] (ref 7.35–7.45)
PLATELET # BLD: 146 10^3/UL (ref 120–450)
PMV BLD AUTO: 10.7 FL (ref 7–11)
PO2 BLDA: 121 MM/HG (ref 80–100)
RBC # BLD AUTO: 3.76 10^6/UL (ref 3.5–6.1)
WBC # BLD AUTO: 24.3 10^3/UL (ref 4.5–11)

## 2018-12-03 RX ADMIN — NYSTATIN SCH APPLIC: 100000 POWDER TOPICAL at 05:48

## 2018-12-03 RX ADMIN — MEROPENEM AND SODIUM CHLORIDE SCH MLS/HR: 500 INJECTION, SOLUTION INTRAVENOUS at 05:40

## 2018-12-03 RX ADMIN — IPRATROPIUM BROMIDE AND ALBUTEROL SULFATE SCH ML: .5; 3 SOLUTION RESPIRATORY (INHALATION) at 07:55

## 2018-12-03 RX ADMIN — PREDNISOLONE ACETATE SCH DROP: 10 SUSPENSION/ DROPS OPHTHALMIC at 09:18

## 2018-12-03 RX ADMIN — BRIMONIDINE TARTRATE SCH DROP: 1.5 SOLUTION/ DROPS OPHTHALMIC at 13:36

## 2018-12-03 RX ADMIN — LEVOTHYROXINE SODIUM ANHYDROUS SCH MCG: 100 INJECTION, POWDER, LYOPHILIZED, FOR SOLUTION INTRAVENOUS at 13:33

## 2018-12-03 RX ADMIN — INSULIN HUMAN SCH: 100 INJECTION, SOLUTION PARENTERAL at 02:01

## 2018-12-03 RX ADMIN — IPRATROPIUM BROMIDE AND ALBUTEROL SULFATE SCH ML: .5; 3 SOLUTION RESPIRATORY (INHALATION) at 13:51

## 2018-12-03 RX ADMIN — VASOPRESSIN SCH MLS/HR: 20 INJECTION, SOLUTION INTRAMUSCULAR; SUBCUTANEOUS at 02:39

## 2018-12-03 RX ADMIN — WHITE PETROLATUM MINERAL OIL SCH APPLIC: 150; 830 OINTMENT OPHTHALMIC at 05:47

## 2018-12-03 RX ADMIN — IPRATROPIUM BROMIDE AND ALBUTEROL SULFATE SCH ML: .5; 3 SOLUTION RESPIRATORY (INHALATION) at 01:58

## 2018-12-03 RX ADMIN — VANCOMYCIN HYDROCHLORIDE SCH MLS/HR: 1 INJECTION, POWDER, LYOPHILIZED, FOR SOLUTION INTRAVENOUS at 02:05

## 2018-12-03 RX ADMIN — BRIMONIDINE TARTRATE SCH DROP: 1.5 SOLUTION/ DROPS OPHTHALMIC at 05:47

## 2018-12-03 RX ADMIN — INSULIN HUMAN SCH: 100 INJECTION, SOLUTION PARENTERAL at 09:19

## 2018-12-03 RX ADMIN — INSULIN HUMAN SCH: 100 INJECTION, SOLUTION PARENTERAL at 13:50

## 2018-12-03 RX ADMIN — AMIODARONE HYDROCHLORIDE SCH MLS/HR: 1.8 INJECTION, SOLUTION INTRAVENOUS at 01:18

## 2018-12-03 NOTE — CP.CCUPN
<Dionte Courtney - Last Filed: 12/03/18 12:14>





CCU Subjective





- Physician Review


Subjective (Free Text): 





Dionte Courtney, PGY1 ICU Progress Note for Dr. Christensen 





Patient was seen at bedside this morning. Patient currently has amiodarone 

running at 0.5 rate. Pressors are off. Patient is intubated. A-line is in place.

TLC in R-femoral groin in place. PRVC settings: 450/20/5/50%. Owen is draining 

15cc. Since patient is intubated, ROS not obtained. Patient is not awake, alert,

or following commands. Unresponsive to sternal rub. Patient spiked a fever at 

102.7 during time of interview and has been febrile overnight. Nurse gave rectal

acetaminophen. BP was noted 130/60 at the arterial line. 








CCU Objective





- Vital Signs / Intake & Output


Intake and Output (Last 8hrs): 


                                 Intake & Output











 12/02/18 12/03/18 12/03/18





 22:59 06:59 14:59


 


Intake Total 3030 1712 


 


Output Total 50 50 


 


Balance 2980 1662 


 


Weight  46.539 kg 


 


Intake:   


 


  IV 3030 1712 


 


    Right Femoral 3000 1712 


 


  Oral 0 0 


 


Output:   


 


  Urine 50 50 


 


    Straight 50 50 


 


  Stool  0 


 


  Emesis  0 


 


Other:   


 


  # Bowel Movements 0 0 














- Physical Exam


Physical Exam Limitations: Positive for: Clinical Condition


Head: Positive for: Atraumatic, Normocephalic


Pupils: Positive for: Other (L-eye is opaque ).  Negative for: PERRL


Extroacular Muscles: Negative for: EOMI


Mouth: Positive for: Other (ET tube in place)


Respiratory/Chest: Positive for: Decreased Breath Sounds.  Negative for: 

Wheezes, Rales, Retracting, Rhonchi


Cardiovascular: Positive for: Normal S1, S2, Other (diminished peripheral pulses

throughout all extremities )


Abdomen: Positive for: Distention


Upper Extremity: Positive for: Other (Diminished pulses ).  Negative for: 

Cyanosis, Edema, NORMAL PULSES


Lower Extremity: Positive for: Other (A-line and femoral TLC in place at the 

right groin).  Negative for: Edema, NORMAL PULSES (Diminished pulses )


Neurological: Negative for: GCS=15


Skin: Positive for: Warm, Dry


Psychiatric: Negative for: Alert, Oriented x 3





- Medications


Active Medications: 


Active Medications











Generic Name Dose Route Start Last Admin





  Trade Name Freq  PRN Reason Stop Dose Admin


 


Acetaminophen  650 mg  11/26/18 21:12  





  Tylenol 325mg Tab  PO   





  Q6H PRN   





  Fever >100.4 F   





     





     





     


 


Acetaminophen  650 mg  12/02/18 18:05  12/03/18 06:37





  Tylenol 650 Mg Supp  RC   650 mg





  Q6H PRN   Administration





  Fever >100.4 F   





     





     





     


 


Albuterol/Ipratropium  3 ml  11/27/18 05:25  12/01/18 23:46





  Duoneb 3 Mg/0.5 Mg (3 Ml) Ud  IH   3 ml





  Q2H PRN   Administration





  Shortness of Breath   





     





     





     


 


Albuterol/Ipratropium  3 ml  11/27/18 08:00  12/03/18 07:55





  Duoneb 3 Mg/0.5 Mg (3 Ml) Ud  IH   3 ml





  [id number] LEONARDA   Administration





     





     





     





     


 


Artificial Tears  1 gm  12/02/18 12:00  12/03/18 05:47





  Artificial Tears Opht Oint  OU   1 applic





  Q6 LEONARDA   Administration





     





     





     





     


 


Aspirin  81 mg  11/27/18 10:00  12/03/18 09:18





  Ecotrin  PO   Not Given





  DAILY LEONARDA   





     





     





     





     


 


Atorvastatin Calcium  40 mg  11/28/18 14:54  12/03/18 09:26





  Lipitor  PO   Not Given





  DAILY LEONARDA   





     





     





     





     


 


Brimonidine Tartrate  1 drop  11/26/18 20:30  12/03/18 05:47





  Alphagan P 0.15% Opht  OS   1 drop





  Q8H LEONARDA   Administration





     





     





     





     


 


Cholecalciferol  2,000 intlu  11/27/18 10:00  12/02/18 14:05





  Vitamin D  PO   Not Given





  DAILY LEONARDA   





     





     





     





     


 


Home Med  0 unit  11/27/18 18:00  12/03/18 09:19





  Home Med  OS   1 unit





  TID LEONARDA   Administration





     





     





     





     


 


Vancomycin HCl  1 gm in 250 mls @ 167 mls/hr  11/27/18 13:15  12/03/18 02:05





  Vancomycin 1gm  IVPB   167 mls/hr





  Q12H LEONARDA   Administration





     





     





  Protocol   





     


 


Dopamine HCl/Dextrose  400 mg in 250 mls @ 15.811 mls/hr  12/02/18 00:57  

12/02/18 07:00





  Dopamine 400mg/250ml D5w  IV   0 mcg/kg/min





  .[id number] PRN   0 mls/hr





  TITRATE PER MD ORDER   Titration





     





  Protocol   





  5 MCG/KG/MIN   


 


NOREPINEPHRINE BIT/0.9 % NACL  4 mg in 250 mls @ 15 mls/hr  12/02/18 01:09  

12/02/18 16:29





  Levophed 4 Mg/ 250 Ml Ns Premixed  IV   6 mcg/min





  .[id number] PRN   22.5 mls/hr





  TITRATE PER MD ORDER   Administration





     





  Protocol   





  4 MCG/MIN   


 


Vasopressin 20 units/ Dextrose  101 mls @ 9.09 mls/hr  12/02/18 01:45  12/03/18 

02:39





  IV   9.09 mls/hr





  .[id number] LEONARDA   Administration





     





     





  Protocol   





  0.03 U/MIN   


 


Sodium Chloride  1,000 mls @ 100 mls/hr  12/02/18 02:30  12/03/18 10:49





  Sodium Chloride 0.45%  IV   100 mls/hr





  .Q10H LEONARDA   Administration





     





     





     





     


 


Dexmedetomidine HCl  400 mcg in 100 mls @ 4.216 mls/hr  12/02/18 02:50  12/02/18

05:19





  Precedex 400mcg/100ml  IV   1 mcg/kg/hr





  .[id number] PRN   21.081 mls/hr





  Heart rate   Titration





     





  Protocol   





  0.2 MCG/KG/HR   


 


Amiodarone HCl/Dextrose  360 mg in 200 mls @ 16.667 mls/hr  12/02/18 14:16  

12/03/18 01:18





  Nexterone 360 Mg In D5w 200 Ml (Premix)  IV   16.667 mls/hr





  .Q12H LEONARDA   Administration





     





     





  Protocol   





  0.5 MG/MIN   


 


Meropenem/Sodium Chloride  500 mg in 50 mls @ 100 mls/hr  12/04/18 06:00  





  Merrem Iv 500 Mg/Ns 50 Ml  IVPB   





  0600 LEONARDA   





     





     





  Protocol   





     


 


Insulin Human Regular  0 units  12/02/18 19:30  12/03/18 09:19





  Humulin R Low  SC   Not Given





  Q6H LEONARDA   





     





     





  Protocol   





     


 


Levothyroxine Sodium  125 mcg  11/27/18 10:00  11/28/18 09:38





  Synthroid  PO   Not Given





  DAILY LEONARDA   





     





     





     





     


 


Levothyroxine Sodium  60 mcg  11/29/18 10:00  12/02/18 14:39





  Synthroid  IVP   60 mcg





  DAILY LEONARDA   Administration





     





     





     





     


 


Lorazepam  0.5 mg  12/01/18 11:22  12/01/18 12:51





  Ativan  IVP   0.5 mg





  Q6H PRN   Administration





  Anxiety/Agitation   





     





  Protocol   





     


 


Nystatin  0 gm  11/28/18 04:15  12/03/18 05:48





  Nystop Topical Powder  TOP   1 applic





  Q8H LEONARDA   Administration





     





     





     





     


 


Pantoprazole Sodium  40 mg  11/27/18 10:00  12/03/18 09:16





  Protonix Inj  IVP   40 mg





  DAILY LEONARDA   Administration





     





     





     





     


 


Prednisolone Acetate  0 ml  11/27/18 16:00  12/03/18 09:18





  Pred Forte 1% Opht Susp  OS   1 drop





  DAILY LEONARDA   Administration





     





     





     





     


 


Risperidone  0.5 mg  11/26/18 22:00  12/02/18 22:18





  Risperdal Oral Soln  PO   Not Given





  HS LEONARDA   





     





     





  Protocol   





     


 


Thiamine HCl  100 mg  11/28/18 18:00  12/02/18 17:43





  Vitamin B1 Tab  PO   Not Given





  BID LEONARDA   





     





     





     





     


 


Ziprasidone  10 mg  11/28/18 09:25  11/30/18 21:54





  Geodon Inj  IM   10 mg





  Q12 PRN   Administration





  severe agitaiton/psychosis   





     





  Protocol   





     














- Patient Studies


Lab Studies: 


                              Microbiology Studies











 12/02/18 01:30 Blood Culture - Preliminary





 Blood    NO GROWTH AFTER 24 HOURS


 


 12/02/18 02:05 Gram Stain - Final





 Trachasp 








                                   Lab Studies











  12/03/18 12/03/18 12/03/18 Range/Units





  05:40 05:30 05:30 


 


WBC    24.3 H  (4.5-11.0)  10^3/uL


 


RBC    3.76  (3.5-6.1)  10^6/uL


 


Hgb    11.5 L  (14.0-18.0)  g/dL


 


Hct    37.1 L  (42.0-52.0)  %


 


MCV    98.7  D  (80.0-105.0)  fl


 


MCH    30.6  (25.0-35.0)  pg


 


MCHC    31.0  (31.0-37.0)  g/dl


 


RDW    14.7 H  (11.5-14.5)  %


 


Plt Count    146  (120.0-450.0)  10^3/uL


 


MPV    10.7  (7.0-11.0)  fl


 


pCO2  27 L    (35-45)  mm/Hg


 


pO2  121.0 H    ()  mm/Hg


 


HCO3  17.9 L    (21-28)  mmol/L


 


ABG pH  7.43    (7.35-7.45)  


 


ABG Total CO2  18.7 L    (22-28)  mmol.L


 


ABG O2 Saturation  100.4 H    (95-98)  %


 


ABG O2 Content  12.0 L    (15-23)  ML/dl


 


ABG Base Excess  -5.5 L    (-2.0-3.0)  mmol/L


 


ABG Hemoglobin  8.6 L    (11.7-17.4)  g/dL


 


ABG Carboxyhemoglobin  2.6 H    (0.5-1.5)  %


 


POC ABG HHb (Measured)  -0.4 L    (0-5)  %


 


ABG Methemoglobin  0.7    (0.0-3.0)  %


 


ABG O2 Capacity  12.0 L    (16-24)  mL/dl


 


Hgb O2 Saturation  97.1    (95.0-98.0)  %


 


FiO2  30.0    %


 


Sodium   151 H   (132-148)  mmol/L


 


Potassium   4.4   (3.6-5.0)  mmol/L


 


Chloride   120 H   ()  mmol/L


 


Carbon Dioxide   23   (21-33)  mmol/L


 


Anion Gap   12   (10-20)  


 


BUN   46 H   (7-21)  mg/dL


 


Creatinine   5.2 H   (0.8-1.5)  mg/dl


 


Est GFR (African Amer)   13   


 


Est GFR (Non-Af Amer)   11   


 


POC Glucose (mg/dL)     ()  mg/dL


 


Random Glucose   136 H   ()  mg/dL


 


Calcium   7.6 L   (8.4-10.5)  mg/dL


 


Phosphorus   4.6 H   (2.5-4.5)  mg/dL


 


Magnesium   2.0   (1.7-2.2)  mg/dL


 


Total Bilirubin   0.9   (0.2-1.3)  mg/dL


 


AST   302 H D   (17-59)  U/L


 


ALT   393 H   (7-56)  U/L


 


Alkaline Phosphatase   62   ()  U/L


 


Total Protein   5.0 L   (5.8-8.3)  g/dL


 


Albumin   2.5 L   (3.0-4.8)  g/dL


 


Globulin   2.5   gm/dL


 


Albumin/Globulin Ratio   1.0 L   (1.1-1.8)  


 


Procalcitonin     (0.19-0.49)  NG/ML














  12/03/18 12/02/18 12/02/18 Range/Units





  00:04 20:23 16:08 


 


WBC     (4.5-11.0)  10^3/uL


 


RBC     (3.5-6.1)  10^6/uL


 


Hgb     (14.0-18.0)  g/dL


 


Hct     (42.0-52.0)  %


 


MCV     (80.0-105.0)  fl


 


MCH     (25.0-35.0)  pg


 


MCHC     (31.0-37.0)  g/dl


 


RDW     (11.5-14.5)  %


 


Plt Count     (120.0-450.0)  10^3/uL


 


MPV     (7.0-11.0)  fl


 


pCO2     (35-45)  mm/Hg


 


pO2     ()  mm/Hg


 


HCO3     (21-28)  mmol/L


 


ABG pH     (7.35-7.45)  


 


ABG Total CO2     (22-28)  mmol.L


 


ABG O2 Saturation     (95-98)  %


 


ABG O2 Content     (15-23)  ML/dl


 


ABG Base Excess     (-2.0-3.0)  mmol/L


 


ABG Hemoglobin     (11.7-17.4)  g/dL


 


ABG Carboxyhemoglobin     (0.5-1.5)  %


 


POC ABG HHb (Measured)     (0-5)  %


 


ABG Methemoglobin     (0.0-3.0)  %


 


ABG O2 Capacity     (16-24)  mL/dl


 


Hgb O2 Saturation     (95.0-98.0)  %


 


FiO2     %


 


Sodium     (132-148)  mmol/L


 


Potassium     (3.6-5.0)  mmol/L


 


Chloride     ()  mmol/L


 


Carbon Dioxide     (21-33)  mmol/L


 


Anion Gap     (10-20)  


 


BUN     (7-21)  mg/dL


 


Creatinine     (0.8-1.5)  mg/dl


 


Est GFR (African Amer)     


 


Est GFR (Non-Af Amer)     


 


POC Glucose (mg/dL)  144 H  172 H  266 H  ()  mg/dL


 


Random Glucose     ()  mg/dL


 


Calcium     (8.4-10.5)  mg/dL


 


Phosphorus     (2.5-4.5)  mg/dL


 


Magnesium     (1.7-2.2)  mg/dL


 


Total Bilirubin     (0.2-1.3)  mg/dL


 


AST     (17-59)  U/L


 


ALT     (7-56)  U/L


 


Alkaline Phosphatase     ()  U/L


 


Total Protein     (5.8-8.3)  g/dL


 


Albumin     (3.0-4.8)  g/dL


 


Globulin     gm/dL


 


Albumin/Globulin Ratio     (1.1-1.8)  


 


Procalcitonin     (0.19-0.49)  NG/ML














  12/02/18 Range/Units





  01:00 


 


WBC   (4.5-11.0)  10^3/uL


 


RBC   (3.5-6.1)  10^6/uL


 


Hgb   (14.0-18.0)  g/dL


 


Hct   (42.0-52.0)  %


 


MCV   (80.0-105.0)  fl


 


MCH   (25.0-35.0)  pg


 


MCHC   (31.0-37.0)  g/dl


 


RDW   (11.5-14.5)  %


 


Plt Count   (120.0-450.0)  10^3/uL


 


MPV   (7.0-11.0)  fl


 


pCO2   (35-45)  mm/Hg


 


pO2   ()  mm/Hg


 


HCO3   (21-28)  mmol/L


 


ABG pH   (7.35-7.45)  


 


ABG Total CO2   (22-28)  mmol.L


 


ABG O2 Saturation   (95-98)  %


 


ABG O2 Content   (15-23)  ML/dl


 


ABG Base Excess   (-2.0-3.0)  mmol/L


 


ABG Hemoglobin   (11.7-17.4)  g/dL


 


ABG Carboxyhemoglobin   (0.5-1.5)  %


 


POC ABG HHb (Measured)   (0-5)  %


 


ABG Methemoglobin   (0.0-3.0)  %


 


ABG O2 Capacity   (16-24)  mL/dl


 


Hgb O2 Saturation   (95.0-98.0)  %


 


FiO2   %


 


Sodium   (132-148)  mmol/L


 


Potassium   (3.6-5.0)  mmol/L


 


Chloride   ()  mmol/L


 


Carbon Dioxide   (21-33)  mmol/L


 


Anion Gap   (10-20)  


 


BUN   (7-21)  mg/dL


 


Creatinine   (0.8-1.5)  mg/dl


 


Est GFR (African Amer)   


 


Est GFR (Non-Af Amer)   


 


POC Glucose (mg/dL)   ()  mg/dL


 


Random Glucose   ()  mg/dL


 


Calcium   (8.4-10.5)  mg/dL


 


Phosphorus   (2.5-4.5)  mg/dL


 


Magnesium   (1.7-2.2)  mg/dL


 


Total Bilirubin   (0.2-1.3)  mg/dL


 


AST   (17-59)  U/L


 


ALT   (7-56)  U/L


 


Alkaline Phosphatase   ()  U/L


 


Total Protein   (5.8-8.3)  g/dL


 


Albumin   (3.0-4.8)  g/dL


 


Globulin   gm/dL


 


Albumin/Globulin Ratio   (1.1-1.8)  


 


Procalcitonin  0.09 L  (0.19-0.49)  NG/ML








                         Laboratory Results - last 24 hr











  12/02/18 12/02/18 12/02/18





  01:00 16:08 20:23


 


WBC   


 


RBC   


 


Hgb   


 


Hct   


 


MCV   


 


MCH   


 


MCHC   


 


RDW   


 


Plt Count   


 


MPV   


 


pCO2   


 


pO2   


 


HCO3   


 


ABG pH   


 


ABG Total CO2   


 


ABG O2 Saturation   


 


ABG O2 Content   


 


ABG Base Excess   


 


ABG Hemoglobin   


 


ABG Carboxyhemoglobin   


 


POC ABG HHb (Measured)   


 


ABG Methemoglobin   


 


ABG O2 Capacity   


 


Hgb O2 Saturation   


 


FiO2   


 


Sodium   


 


Potassium   


 


Chloride   


 


Carbon Dioxide   


 


Anion Gap   


 


BUN   


 


Creatinine   


 


Est GFR ( Amer)   


 


Est GFR (Non-Af Amer)   


 


POC Glucose (mg/dL)   266 H  172 H


 


Random Glucose   


 


Calcium   


 


Phosphorus   


 


Magnesium   


 


Total Bilirubin   


 


AST   


 


ALT   


 


Alkaline Phosphatase   


 


Total Protein   


 


Albumin   


 


Globulin   


 


Albumin/Globulin Ratio   


 


Procalcitonin  0.09 L  














  12/03/18 12/03/18 12/03/18





  00:04 05:30 05:30


 


WBC   24.3 H 


 


RBC   3.76 


 


Hgb   11.5 L 


 


Hct   37.1 L 


 


MCV   98.7  D 


 


MCH   30.6 


 


MCHC   31.0 


 


RDW   14.7 H 


 


Plt Count   146 


 


MPV   10.7 


 


pCO2   


 


pO2   


 


HCO3   


 


ABG pH   


 


ABG Total CO2   


 


ABG O2 Saturation   


 


ABG O2 Content   


 


ABG Base Excess   


 


ABG Hemoglobin   


 


ABG Carboxyhemoglobin   


 


POC ABG HHb (Measured)   


 


ABG Methemoglobin   


 


ABG O2 Capacity   


 


Hgb O2 Saturation   


 


FiO2   


 


Sodium    151 H


 


Potassium    4.4


 


Chloride    120 H


 


Carbon Dioxide    23


 


Anion Gap    12


 


BUN    46 H


 


Creatinine    5.2 H


 


Est GFR ( Amer)    13


 


Est GFR (Non-Af Amer)    11


 


POC Glucose (mg/dL)  144 H  


 


Random Glucose    136 H


 


Calcium    7.6 L


 


Phosphorus    4.6 H


 


Magnesium    2.0


 


Total Bilirubin    0.9


 


AST    302 H D


 


ALT    393 H


 


Alkaline Phosphatase    62


 


Total Protein    5.0 L


 


Albumin    2.5 L


 


Globulin    2.5


 


Albumin/Globulin Ratio    1.0 L


 


Procalcitonin   














  12/03/18





  05:40


 


WBC 


 


RBC 


 


Hgb 


 


Hct 


 


MCV 


 


MCH 


 


MCHC 


 


RDW 


 


Plt Count 


 


MPV 


 


pCO2  27 L


 


pO2  121.0 H


 


HCO3  17.9 L


 


ABG pH  7.43


 


ABG Total CO2  18.7 L


 


ABG O2 Saturation  100.4 H


 


ABG O2 Content  12.0 L


 


ABG Base Excess  -5.5 L


 


ABG Hemoglobin  8.6 L


 


ABG Carboxyhemoglobin  2.6 H


 


POC ABG HHb (Measured)  -0.4 L


 


ABG Methemoglobin  0.7


 


ABG O2 Capacity  12.0 L


 


Hgb O2 Saturation  97.1


 


FiO2  30.0


 


Sodium 


 


Potassium 


 


Chloride 


 


Carbon Dioxide 


 


Anion Gap 


 


BUN 


 


Creatinine 


 


Est GFR ( Amer) 


 


Est GFR (Non-Af Amer) 


 


POC Glucose (mg/dL) 


 


Random Glucose 


 


Calcium 


 


Phosphorus 


 


Magnesium 


 


Total Bilirubin 


 


AST 


 


ALT 


 


Alkaline Phosphatase 


 


Total Protein 


 


Albumin 


 


Globulin 


 


Albumin/Globulin Ratio 


 


Procalcitonin 











Fingerstick Blood Sugar Results: 130





Review of Systems





- Review of Systems


Systems not reviewed;Unavailable: Intubated





Critical Care Progress Note





- Ventilator Checklist


Head of Bed 30 Degrees: Yes


Daily Sedation Vacation: Yes


Daily Assessment of Readiness to Wean: Yes


Daily Spontaneous Breathing Trial: Yes


PUD Prophalyxis: Yes


DVT Prophylaxis: Yes


Oral Care with Chlorhexidine Gluconate {CHG}: Yes





- Vent Settings


MODE:: PRVC


TIDAL VOLUME:: 450


RESP RATE:: 20


FIO2:: 50


PEEP:: 5





- Extremities/Vascular


Does the Patient have a Central Venous Catheter?: Yes


Insertion Site: Femoral Vein


Does the Patient need a Central Venous Catheter?: Yes


Does the Patient have a Owen Catheter?: Yes


Does the Patient need a Owen Catheter?: Yes


Catheter Insertion Criteria: Need for accurate measurement of output in 

critically ill patient





- Prophylaxis GI


Prophylaxis GI: PPI





- Prophylaxis DVT


Prophylaxis DVT: SCDs





- Nutrition


Nutrition: 


                                    Nutrition











 Category Date Time Status


 


 NPO Diet [DIET] Diets  12/02/18 Breakfast Ordered














Assessment/Plan





- Assessment and Plan (Free Text)


Assessment: 





Patient is a 82 year old male with PMHx of HTN, COPD, hx of small bilateral 

subdural hematoma secondary to trauma from fall without neurosurgical 

intervention(4/2018), hypothyroidism, HLD, CAD s/p cardiac stent 2003 who 

[place] [facility] [place] of altered mental status/agitation. 

During hospital course, patient had multiple episodes of cardiopulmonary arrest 

while he was getting NG tube placement. ACLS protocol was initiated and patient 

was brought to the ICU. Patient coded again in the ICU. ROSC was obtained. 

Family members made patient DNR. Overall prognosis is poor. 


Plan: 





Neuro:


- Not alert, awake, or following commands. Intubated. Patient is s/p x4 

cardiopulmonary arrests 


- Will obtain Head CT to r/o stroke 


- No corneal or gag reflex on exam. Dismal prognosis. 


- Neurology is on board. Follow up recommendations. 





Cardio:


- Patient currently off pressors. Currently on amiodarone. Continue to monitor 

blood pressure. 


- s/p Code Blue in the ICU, ROSC was achieved. ACLS protocol was followed 

appropriately. Patient made DNR via family. 


- Arterial line in place at femoral for BP monitoring 


- TLC femoral line in place


- Maintain MAP >65


- Hx CAD, HTN, HLD, Infrarenal AAA 5.4cm 





Pulm:


- Intubated on the ventilator. ET tube in place. PRVC vent settings. 


- Maintain HoB > 35 degrees, oral care, suctioning, DVT/PUD ppx, weaning trial. 


- CXR: appropriate lines are in place. 


- Maintain SaO2 > 90% 


- COPD, hx Emphysema





GI: 


- Transaminitis is worsening: AST//393; Hepatic failure  


- Abd/Pelvis CT without IV contrast showing hepatomegaly, multiple low-

attenuation lesions in the renal cortex likely representing benign cysts, large 

hiatus hernia, infrarenal abdominal aortic aneurysm measuring 5.4cm


- Protonix for GI ppx 


- NPO diet except meds





Renal:


- Significantly worsening renal failure: BUN/Cr is 46/5.2


- 1 L bolus of IVF given


- Monitor electrolytes





ID:


- Septic Shock with Multiorgan Dysfunction 


- c/w merrem and vanco. ID recs for antibiotics. 


- Leukocytosis. Patient is febrile; likely central in origin.


- f/u septic workup 


- Blood cx negative x1 (prelim) after 24 hours 


- ID consulted, appreciated recs





Heme:


- DVT ppx 


- H/H stable at this time 





Code Status: DNR 


Palliative Care is following. 





Dispo: Patient has poor prognosis. Code status is DNR. Meeting between 

palliative care and family (Patient's wife) today. 





Case was discussed and reviewed with Attending Physician, Dr. Christensen








<Carter Christensen - Last Filed: 12/03/18 13:29>





CCU Objective





- Vital Signs / Intake & Output


Vital Signs (Last 4 hours): 


Vital Signs











  Temp Pulse BP BP Pulse Ox


 


 12/03/18 12:35  102.0 F H  110 H  84/45 L   100


 


 12/03/18 12:00  102.0 F H  111 H  82/34 L   100


 


 12/03/18 11:59  102.0 F H  111 H   


 


 12/03/18 11:58  102.0 F H  111 H   


 


 12/03/18 11:57  101.8 F H  110 H   


 


 12/03/18 11:56  102.0 F H  110 H   


 


 12/03/18 11:55  101.8 F H  111 H   


 


 12/03/18 11:54  101.8 F H  110 H   


 


 12/03/18 11:53  101.8 F H  111 H   


 


 12/03/18 11:52  101.8 F H  110 H   


 


 12/03/18 11:51  101.8 F H  111 H   


 


 12/03/18 11:50  101.8 F H  111 H   


 


 12/03/18 11:49  101.8 F H  111 H   


 


 12/03/18 11:48  101.8 F H  111 H   


 


 12/03/18 11:47  101.8 F H  111 H   


 


 12/03/18 11:46  101.8 F H  111 H   


 


 12/03/18 11:45  101.8 F H  111 H   


 


 12/03/18 11:44  101.8 F H  111 H   


 


 12/03/18 11:43  101.8 F H  111 H   


 


 12/03/18 11:42  101.8 F H  110 H   


 


 12/03/18 11:41  101.8 F H  109 H   


 


 12/03/18 11:38   109 H   


 


 12/03/18 11:37  101.8 F H  109 H   


 


 12/03/18 11:36  101.8 F H  109 H   


 


 12/03/18 11:35  102.0 F H  109 H   


 


 12/03/18 11:34  102.0 F H  109 H   


 


 12/03/18 11:33  102.0 F H  109 H   


 


 12/03/18 11:32  101.8 F H  109 H   


 


 12/03/18 11:31  102.0 F H  111 H   


 


 12/03/18 11:30  102.0 F H  111 H   


 


 12/03/18 11:29  102.0 F H  111 H   


 


 12/03/18 11:28  102.0 F H  113 H   


 


 12/03/18 11:27  102.0 F H  111 H   


 


 12/03/18 11:26  102.0 F H  111 H   


 


 12/03/18 11:25  102.0 F H  108 H   


 


 12/03/18 11:24  102.0 F H  112 H   


 


 12/03/18 11:23  102.2 F H  112 H   


 


 12/03/18 11:22  102.2 F H  112 H   


 


 12/03/18 11:21  102.2 F H  112 H   


 


 12/03/18 11:20  102.2 F H  112 H   


 


 12/03/18 11:19  102.2 F H  113 H   


 


 12/03/18 11:18  102.0 F H  112 H   


 


 12/03/18 11:17  102.0 F H  113 H   


 


 12/03/18 11:16  102.0 F H  112 H   


 


 12/03/18 11:15  102.0 F H  112 H   


 


 12/03/18 11:14  102.0 F H  112 H   


 


 12/03/18 11:00   110 H   134/61 


 


 12/03/18 10:00   110 H   134/61 











Intake and Output (Last 8hrs): 


                                 Intake & Output











 12/02/18 12/03/18 12/03/18





 22:59 06:59 14:59


 


Intake Total 3030 1712 


 


Output Total 50 50 


 


Balance 2980 1662 


 


Weight  102 lb 9.6 oz 


 


Intake:   


 


  IV 3030 1712 


 


    Right Femoral 3000 1712 


 


  Oral 0 0 


 


Output:   


 


  Urine 50 50 


 


    Straight 50 50 


 


  Stool  0 


 


  Emesis  0 


 


Other:   


 


  # Bowel Movements 0 0 














- Medications


Active Medications: 


Active Medications











Generic Name Dose Route Start Last Admin





  Trade Name Freq  PRN Reason Stop Dose Admin


 


Acetaminophen  650 mg  11/26/18 21:12  





  Tylenol 325mg Tab  PO   





  Q6H PRN   





  Fever >100.4 F   





     





     





     


 


Acetaminophen  650 mg  12/02/18 18:05  12/03/18 06:37





  Tylenol 650 Mg Supp  RC   650 mg





  Q6H PRN   Administration





  Fever >100.4 F   





     





     





     


 


Albuterol/Ipratropium  3 ml  11/27/18 05:25  12/01/18 23:46





  Duoneb 3 Mg/0.5 Mg (3 Ml) Ud  IH   3 ml





  Q2H PRN   Administration





  Shortness of Breath   





     





     





     


 


Albuterol/Ipratropium  3 ml  11/27/18 08:00  12/03/18 07:55





  Duoneb 3 Mg/0.5 Mg (3 Ml) Ud  IH   3 ml





  [id number] LEONARDA   Administration





     





     





     





     


 


Artificial Tears  1 gm  12/02/18 12:00  12/03/18 05:47





  Artificial Tears Opht Oint  OU   1 applic





  Q6 LEONARDA   Administration





     





     





     





     


 


Aspirin  81 mg  11/27/18 10:00  12/03/18 09:18





  Ecotrin  PO   Not Given





  DAILY LEONARDA   





     





     





     





     


 


Atorvastatin Calcium  40 mg  11/28/18 14:54  12/03/18 09:26





  Lipitor  PO   Not Given





  DAILY LEONARDA   





     





     





     





     


 


Brimonidine Tartrate  1 drop  11/26/18 20:30  12/03/18 05:47





  Alphagan P 0.15% Opht  OS   1 drop





  Q8H LEONARDA   Administration





     





     





     





     


 


Cholecalciferol  2,000 intlu  11/27/18 10:00  12/02/18 14:05





  Vitamin D  PO   Not Given





  DAILY LEONARDA   





     





     





     





     


 


Home Med  0 unit  11/27/18 18:00  12/03/18 09:19





  Home Med  OS   1 unit





  TID LEONARDA   Administration





     





     





     





     


 


Vancomycin HCl  1 gm in 250 mls @ 167 mls/hr  11/27/18 13:15  12/03/18 02:05





  Vancomycin 1gm  IVPB   167 mls/hr





  Q12H LEONARDA   Administration





     





     





  Protocol   





     


 


Dopamine HCl/Dextrose  400 mg in 250 mls @ 15.811 mls/hr  12/02/18 00:57  

12/02/18 07:00





  Dopamine 400mg/250ml D5w  IV   0 mcg/kg/min





  .[id number] PRN   0 mls/hr





  TITRATE PER MD ORDER   Titration





     





  Protocol   





  5 MCG/KG/MIN   


 


NOREPINEPHRINE BIT/0.9 % NACL  4 mg in 250 mls @ 15 mls/hr  12/02/18 01:09  

12/02/18 16:29





  Levophed 4 Mg/ 250 Ml Ns Premixed  IV   6 mcg/min





  .[id number] PRN   22.5 mls/hr





  TITRATE PER MD ORDER   Administration





     





  Protocol   





  4 MCG/MIN   


 


Vasopressin 20 units/ Dextrose  101 mls @ 9.09 mls/hr  12/02/18 01:45  12/03/18 

02:39





  IV   9.09 mls/hr





  .Q11H7M [facility]   Administration





     





     





  Protocol   





  0.03 U/MIN   


 


Sodium Chloride  1,000 mls @ 100 mls/hr  12/02/18 02:30  12/03/18 10:49





  Sodium Chloride 0.45%  IV   100 mls/hr





  .Q10H [facility]DA   Administration





     





     





     





     


 


Dexmedetomidine HCl  400 mcg in 100 mls @ 4.216 mls/hr  12/02/18 02:50  12/02/18

 05:19





  Precedex 400mcg/100ml  IV   1 mcg/kg/hr





  .[id number] PRN   21.081 mls/hr





  Heart rate   Titration





     





  Protocol   





  0.2 MCG/KG/HR   


 


Amiodarone HCl/Dextrose  360 mg in 200 mls @ 16.667 mls/hr  12/02/18 14:16  

12/03/18 01:18





  Nexterone 360 Mg In D5w 200 Ml (Premix)  IV   16.667 mls/hr





  .Q12H [facility]DA   Administration





     





     





  Protocol   





  0.5 MG/MIN   


 


Meropenem/Sodium Chloride  500 mg in 50 mls @ 100 mls/hr  12/04/18 06:00  





  Merrem Iv 500 Mg/Ns 50 Ml  IVPB   





  0600 [facility]   





     





     





  Protocol   





     


 


Insulin Human Regular  0 units  12/02/18 19:30  12/03/18 09:19





  Humulin R Low  SC   Not Given





  Q6H [facility]   





     





     





  Protocol   





     


 


Levothyroxine Sodium  125 mcg  11/27/18 10:00  11/28/18 09:38





  Synthroid  PO   Not Given





  DAILY [facility]   





     





     





     





     


 


Levothyroxine Sodium  60 mcg  11/29/18 10:00  12/02/18 14:39





  Synthroid  IVP   60 mcg





  DAILY [facility]DA   Administration





     





     





     





     


 


Lorazepam  0.5 mg  12/01/18 11:22  12/01/18 12:51





  Ativan  IVP   0.5 mg





  Q6H PRN   Administration





  Anxiety/Agitation   





     





  Protocol   





     


 


Nystatin  0 gm  11/28/18 04:15  12/03/18 05:48





  Nystop Topical Powder  TOP   1 applic





  Q8H [facility]   Administration





     





     





     





     


 


Pantoprazole Sodium  40 mg  11/27/18 10:00  12/03/18 09:16





  Protonix Inj  IVP   40 mg





  DAILY [facility]   Administration





     





     





     





     


 


Prednisolone Acetate  0 ml  11/27/18 16:00  12/03/18 09:18





  Pred Forte 1% Opht Susp  OS   1 drop





  DAILY [facility]DA   Administration





     





     





     





     


 


Risperidone  0.5 mg  11/26/18 22:00  12/02/18 22:18





  Risperdal Oral Soln  PO   Not Given





  HS [facility]   





     





     





  Protocol   





     


 


Thiamine HCl  100 mg  11/28/18 18:00  12/02/18 17:43





  Vitamin B1 Tab  PO   Not Given





  BID [facility]   





     





     





     





     


 


Ziprasidone  10 mg  11/28/18 09:25  11/30/18 21:54





  Geodon Inj  IM   10 mg





  Q12 PRN   Administration





  severe agitaiton/psychosis   





     





  Protocol   





     














- Patient Studies


Lab Studies: 


                              Microbiology Studies











 12/02/18 01:30 Blood Culture - Preliminary





 Blood    NO GROWTH AFTER 24 HOURS


 


 12/02/18 02:05 Gram Stain - Final





 Trachasp 








                                   Lab Studies











  12/03/18 12/03/18 12/03/18 Range/Units





  05:40 05:30 05:30 


 


WBC    24.3 H  (4.5-11.0)  10^3/uL


 


RBC    3.76  (3.5-6.1)  10^6/uL


 


Hgb    11.5 L  (14.0-18.0)  g/dL


 


Hct    37.1 L  (42.0-52.0)  %


 


MCV    98.7  D  (80.0-105.0)  fl


 


MCH    30.6  (25.0-35.0)  pg


 


MCHC    31.0  (31.0-37.0)  g/dl


 


RDW    14.7 H  (11.5-14.5)  %


 


Plt Count    146  (120.0-450.0)  10^3/uL


 


MPV    10.7  (7.0-11.0)  fl


 


pCO2  27 L    (35-45)  mm/Hg


 


pO2  121.0 H    ()  mm/Hg


 


HCO3  17.9 L    (21-28)  mmol/L


 


ABG pH  7.43    (7.35-7.45)  


 


ABG Total CO2  18.7 L    (22-28)  mmol.L


 


ABG O2 Saturation  100.4 H    (95-98)  %


 


ABG O2 Content  12.0 L    (15-23)  ML/dl


 


ABG Base Excess  -5.5 L    (-2.0-3.0)  mmol/L


 


ABG Hemoglobin  8.6 L    (11.7-17.4)  g/dL


 


ABG Carboxyhemoglobin  2.6 H    (0.5-1.5)  %


 


POC ABG HHb (Measured)  -0.4 L    (0-5)  %


 


ABG Methemoglobin  0.7    (0.0-3.0)  %


 


ABG O2 Capacity  12.0 L    (16-24)  mL/dl


 


Hgb O2 Saturation  97.1    (95.0-98.0)  %


 


FiO2  30.0    %


 


Sodium   151 H   (132-148)  mmol/L


 


Potassium   4.4   (3.6-5.0)  mmol/L


 


Chloride   120 H   ()  mmol/L


 


Carbon Dioxide   23   (21-33)  mmol/L


 


Anion Gap   12   (10-20)  


 


BUN   46 H   (7-21)  mg/dL


 


Creatinine   5.2 H   (0.8-1.5)  mg/dl


 


Est GFR (African Amer)   13   


 


Est GFR (Non-Af Amer)   11   


 


POC Glucose (mg/dL)     ()  mg/dL


 


Random Glucose   136 H   ()  mg/dL


 


Calcium   7.6 L   (8.4-10.5)  mg/dL


 


Phosphorus   4.6 H   (2.5-4.5)  mg/dL


 


Magnesium   2.0   (1.7-2.2)  mg/dL


 


Total Bilirubin   0.9   (0.2-1.3)  mg/dL


 


AST   302 H D   (17-59)  U/L


 


ALT   393 H   (7-56)  U/L


 


Alkaline Phosphatase   62   ()  U/L


 


Total Protein   5.0 L   (5.8-8.3)  g/dL


 


Albumin   2.5 L   (3.0-4.8)  g/dL


 


Globulin   2.5   gm/dL


 


Albumin/Globulin Ratio   1.0 L   (1.1-1.8)  


 


Procalcitonin     (0.19-0.49)  NG/ML














  12/03/18 12/02/18 12/02/18 Range/Units





  00:04 20:23 16:08 


 


WBC     (4.5-11.0)  10^3/uL


 


RBC     (3.5-6.1)  10^6/uL


 


Hgb     (14.0-18.0)  g/dL


 


Hct     (42.0-52.0)  %


 


MCV     (80.0-105.0)  fl


 


MCH     (25.0-35.0)  pg


 


MCHC     (31.0-37.0)  g/dl


 


RDW     (11.5-14.5)  %


 


Plt Count     (120.0-450.0)  10^3/uL


 


MPV     (7.0-11.0)  fl


 


pCO2     (35-45)  mm/Hg


 


pO2     ()  mm/Hg


 


HCO3     (21-28)  mmol/L


 


ABG pH     (7.35-7.45)  


 


ABG Total CO2     (22-28)  mmol.L


 


ABG O2 Saturation     (95-98)  %


 


ABG O2 Content     (15-23)  ML/dl


 


ABG Base Excess     (-2.0-3.0)  mmol/L


 


ABG Hemoglobin     (11.7-17.4)  g/dL


 


ABG Carboxyhemoglobin     (0.5-1.5)  %


 


POC ABG HHb (Measured)     (0-5)  %


 


ABG Methemoglobin     (0.0-3.0)  %


 


ABG O2 Capacity     (16-24)  mL/dl


 


Hgb O2 Saturation     (95.0-98.0)  %


 


FiO2     %


 


Sodium     (132-148)  mmol/L


 


Potassium     (3.6-5.0)  mmol/L


 


Chloride     ()  mmol/L


 


Carbon Dioxide     (21-33)  mmol/L


 


Anion Gap     (10-20)  


 


BUN     (7-21)  mg/dL


 


Creatinine     (0.8-1.5)  mg/dl


 


Est GFR (African Amer)     


 


Est GFR (Non-Af Amer)     


 


POC Glucose (mg/dL)  144 H  172 H  266 H  ()  mg/dL


 


Random Glucose     ()  mg/dL


 


Calcium     (8.4-10.5)  mg/dL


 


Phosphorus     (2.5-4.5)  mg/dL


 


Magnesium     (1.7-2.2)  mg/dL


 


Total Bilirubin     (0.2-1.3)  mg/dL


 


AST     (17-59)  U/L


 


ALT     (7-56)  U/L


 


Alkaline Phosphatase     ()  U/L


 


Total Protein     (5.8-8.3)  g/dL


 


Albumin     (3.0-4.8)  g/dL


 


Globulin     gm/dL


 


Albumin/Globulin Ratio     (1.1-1.8)  


 


Procalcitonin     (0.19-0.49)  NG/ML














  12/02/18 Range/Units





  01:00 


 


WBC   (4.5-11.0)  10^3/uL


 


RBC   (3.5-6.1)  10^6/uL


 


Hgb   (14.0-18.0)  g/dL


 


Hct   (42.0-52.0)  %


 


MCV   (80.0-105.0)  fl


 


MCH   (25.0-35.0)  pg


 


MCHC   (31.0-37.0)  g/dl


 


RDW   (11.5-14.5)  %


 


Plt Count   (120.0-450.0)  10^3/uL


 


MPV   (7.0-11.0)  fl


 


pCO2   (35-45)  mm/Hg


 


pO2   ()  mm/Hg


 


HCO3   (21-28)  mmol/L


 


ABG pH   (7.35-7.45)  


 


ABG Total CO2   (22-28)  mmol.L


 


ABG O2 Saturation   (95-98)  %


 


ABG O2 Content   (15-23)  ML/dl


 


ABG Base Excess   (-2.0-3.0)  mmol/L


 


ABG Hemoglobin   (11.7-17.4)  g/dL


 


ABG Carboxyhemoglobin   (0.5-1.5)  %


 


POC ABG HHb (Measured)   (0-5)  %


 


ABG Methemoglobin   (0.0-3.0)  %


 


ABG O2 Capacity   (16-24)  mL/dl


 


Hgb O2 Saturation   (95.0-98.0)  %


 


FiO2   %


 


Sodium   (132-148)  mmol/L


 


Potassium   (3.6-5.0)  mmol/L


 


Chloride   ()  mmol/L


 


Carbon Dioxide   (21-33)  mmol/L


 


Anion Gap   (10-20)  


 


BUN   (7-21)  mg/dL


 


Creatinine   (0.8-1.5)  mg/dl


 


Est GFR (African Amer)   


 


Est GFR (Non-Af Amer)   


 


POC Glucose (mg/dL)   ()  mg/dL


 


Random Glucose   ()  mg/dL


 


Calcium   (8.4-10.5)  mg/dL


 


Phosphorus   (2.5-4.5)  mg/dL


 


Magnesium   (1.7-2.2)  mg/dL


 


Total Bilirubin   (0.2-1.3)  mg/dL


 


AST   (17-59)  U/L


 


ALT   (7-56)  U/L


 


Alkaline Phosphatase   ()  U/L


 


Total Protein   (5.8-8.3)  g/dL


 


Albumin   (3.0-4.8)  g/dL


 


Globulin   gm/dL


 


Albumin/Globulin Ratio   (1.1-1.8)  


 


Procalcitonin  0.09 L  (0.19-0.49)  NG/ML








                         Laboratory Results - last 24 hr











  12/02/18 12/02/18 12/02/18





  01:00 16:08 20:23


 


WBC   


 


RBC   


 


Hgb   


 


Hct   


 


MCV   


 


MCH   


 


MCHC   


 


RDW   


 


Plt Count   


 


MPV   


 


pCO2   


 


pO2   


 


HCO3   


 


ABG pH   


 


ABG Total CO2   


 


ABG O2 Saturation   


 


ABG O2 Content   


 


ABG Base Excess   


 


ABG Hemoglobin   


 


ABG Carboxyhemoglobin   


 


POC ABG HHb (Measured)   


 


ABG Methemoglobin   


 


ABG O2 Capacity   


 


Hgb O2 Saturation   


 


FiO2   


 


Sodium   


 


Potassium   


 


Chloride   


 


Carbon Dioxide   


 


Anion Gap   


 


BUN   


 


Creatinine   


 


Est GFR ( Amer)   


 


Est GFR (Non-Af Amer)   


 


POC Glucose (mg/dL)   266 H  172 H


 


Random Glucose   


 


Calcium   


 


Phosphorus   


 


Magnesium   


 


Total Bilirubin   


 


AST   


 


ALT   


 


Alkaline Phosphatase   


 


Total Protein   


 


Albumin   


 


Globulin   


 


Albumin/Globulin Ratio   


 


Procalcitonin  0.09 L  














  12/03/18 12/03/18 12/03/18





  00:04 05:30 05:30


 


WBC   24.3 H 


 


RBC   3.76 


 


Hgb   11.5 L 


 


Hct   37.1 L 


 


MCV   98.7  D 


 


MCH   30.6 


 


MCHC   31.0 


 


RDW   14.7 H 


 


Plt Count   146 


 


MPV   10.7 


 


pCO2   


 


pO2   


 


HCO3   


 


ABG pH   


 


ABG Total CO2   


 


ABG O2 Saturation   


 


ABG O2 Content   


 


ABG Base Excess   


 


ABG Hemoglobin   


 


ABG Carboxyhemoglobin   


 


POC ABG HHb (Measured)   


 


ABG Methemoglobin   


 


ABG O2 Capacity   


 


Hgb O2 Saturation   


 


FiO2   


 


Sodium    151 H


 


Potassium    4.4


 


Chloride    120 H


 


Carbon Dioxide    23


 


Anion Gap    12


 


BUN    46 H


 


Creatinine    5.2 H


 


Est GFR ( Amer)    13


 


Est GFR (Non-Af Amer)    11


 


POC Glucose (mg/dL)  144 H  


 


Random Glucose    136 H


 


Calcium    7.6 L


 


Phosphorus    4.6 H


 


Magnesium    2.0


 


Total Bilirubin    0.9


 


AST    302 H D


 


ALT    393 H


 


Alkaline Phosphatase    62


 


Total Protein    5.0 L


 


Albumin    2.5 L


 


Globulin    2.5


 


Albumin/Globulin Ratio    1.0 L


 


Procalcitonin   














  12/03/18





  05:40


 


WBC 


 


RBC 


 


Hgb 


 


Hct 


 


MCV 


 


MCH 


 


MCHC 


 


RDW 


 


Plt Count 


 


MPV 


 


pCO2  27 L


 


pO2  121.0 H


 


HCO3  17.9 L


 


ABG pH  7.43


 


ABG Total CO2  18.7 L


 


ABG O2 Saturation  100.4 H


 


ABG O2 Content  12.0 L


 


ABG Base Excess  -5.5 L


 


ABG Hemoglobin  8.6 L


 


ABG Carboxyhemoglobin  2.6 H


 


POC ABG HHb (Measured)  -0.4 L


 


ABG Methemoglobin  0.7


 


ABG O2 Capacity  12.0 L


 


Hgb O2 Saturation  97.1


 


FiO2  30.0


 


Sodium 


 


Potassium 


 


Chloride 


 


Carbon Dioxide 


 


Anion Gap 


 


BUN 


 


Creatinine 


 


Est GFR ( Amer) 


 


Est GFR (Non-Af Amer) 


 


POC Glucose (mg/dL) 


 


Random Glucose 


 


Calcium 


 


Phosphorus 


 


Magnesium 


 


Total Bilirubin 


 


AST 


 


ALT 


 


Alkaline Phosphatase 


 


Total Protein 


 


Albumin 


 


Globulin 


 


Albumin/Globulin Ratio 


 


Procalcitonin 














Critical Care Progress Note





- Nutrition


Nutrition: 


                                    Nutrition











 Category Date Time Status


 


 NPO Diet [DIET] Diets  12/02/18 Breakfast Ordered














Assessment/Plan





- Assessment and Plan (Free Text)


Plan: 


Patient seen and examined on rounds with resident, agree with note with 

following additions/exceptions:


Patient is 81yo male with PMhx 80 PMHx of HTN, COPD, hx of small bilateral 

subdural hematoma secondary to trauma from fall without, hypothyroidism, HLD, 

CAD s/p cardiac stent 2003 [place] [facility] [place] of a

ltered mental status/agitation. During hospital course, patient had multiple 

episodes of cardiopulmonary arrest, unknown etiology. 


Currently the patient is intubated, OFF sedation, on Vasopressor support, in NAD


Neurological exam demonstrates no pupil reflex, no gag reflex, does not 

overbreath the ventilator, extremely poor prognosis


Patient is now DNR, wife at bedside, palliative care involved, family likely 

pursuing terminal extubation/comfort care, no final decision made yet. 


Labs imaging, chart reviewed. Pt in multi organ failure, Cr today 5.2, minimal 

urine output.


CT head pending





Multi Organ failure


s/p Cardiac arrest


Septic Shock


Renal failure


CAD


HTN


HLD





Recommend:


- cont with vent support, low tidal ventilation, duonebs PRN


- Broad spectrum abx as per ID


- Vasorpessor support, Levophed Vasopressin, Stress dose steroids


- MAP 65


- Repeat BMP, monitor I/O, UOP


- FS control


- CT head


- follow up palliative care


- Pt is DNR, long family discussion today with myself and Courtney Mandujano NP 

(Palliative care), family awaiting final decision from children regarding 

possible terminal extubation


- GI ppx


- DVT ppx


- Monitor in MICU





Extremely poor prognosis


DNR





Critical care time 45 minutes

## 2018-12-03 NOTE — CP.PCM.PN
<Dionte Courtney - Last Filed: 12/03/18 15:54>





Subjective





- Date & Time of Evaluation


Date of Evaluation: 12/03/18


Time of Evaluation: 15:55





- Subjective


Subjective: 





Dionte Courtney PGY1 ICU Progress Note for Dr. Christensen





Patient's wife (Massiel Salazar) and family members were present at bedside. 

Family discussed with the ICU team that they had agreed upon withdrawal of life 

support for Mr. Vasquez Salazar. Patient will be terminally extubated. He 

will be started on morphine drip to provide comfort care. Primary care provider 

(Dr. Angela Warner) was notified. 





Objective





- Vital Signs/Intake and Output


Vital Signs (last 24 hours): 


                                        











Temp Pulse Resp BP Pulse Ox


 


 103.1 F H  118 H  23   91/51 L  98 


 


 12/03/18 15:00  12/03/18 15:00  12/03/18 07:55  12/03/18 15:00  12/03/18 15:00








Intake and Output: 


                                        











 12/03/18 12/03/18





 06:59 18:59


 


Intake Total 1712 


 


Output Total 50 


 


Balance 1662 














- Medications


Medications: 


                               Current Medications





Acetaminophen (Tylenol 325mg Tab)  650 mg PO Q6H PRN


   PRN Reason: Fever >100.4 F


Acetaminophen (Tylenol 650 Mg Supp)  650 mg RC Q6H PRN


   PRN Reason: Fever >100.4 F


   Last Admin: 12/03/18 13:31 Dose:  650 mg


Albuterol/Ipratropium (Duoneb 3 Mg/0.5 Mg (3 Ml) Ud)  3 ml IH Q2H PRN


   PRN Reason: Shortness of Breath


   Last Admin: 12/01/18 23:46 Dose:  3 ml


Albuterol/Ipratropium (Duoneb 3 Mg/0.5 Mg (3 Ml) Ud)  3 ml IH [id number] [facility]


   Last Admin: 12/03/18 13:51 Dose:  3 ml


Artificial Tears (Artificial Tears Opht Oint)  1 gm OU Q6 [facility]


   Last Admin: 12/03/18 05:47 Dose:  1 applic


Aspirin (Ecotrin)  81 mg PO DAILY [facility]


   Last Admin: 12/03/18 09:18 Dose:  Not Given


Atorvastatin Calcium (Lipitor)  40 mg PO DAILY [facility]


   Last Admin: 12/03/18 09:26 Dose:  Not Given


Brimonidine Tartrate (Alphagan P 0.15% Opht)  1 drop OS Q8H [facility]


   Last Admin: 12/03/18 13:36 Dose:  1 drop


Cholecalciferol (Vitamin D)  2,000 intlu PO DAILY [facility]


   Last Admin: 12/02/18 14:05 Dose:  Not Given


Home Med (Home Med)  0 unit OS TID [facility]


   Last Admin: 12/03/18 13:37 Dose:  1 unit


Vancomycin HCl (Vancomycin 1gm)  1 gm in 250 mls @ 167 mls/hr IVPB Q12H [facility]; 

Protocol


   Last Admin: 12/03/18 02:05 Dose:  167 mls/hr


Dopamine HCl/Dextrose (Dopamine 400mg/250ml D5w)  400 mg in 250 mls @ 15.811 ml

s/hr IV .[id number] PRN; Protocol


   PRN Reason: TITRATE PER MD ORDER


   Last Titration: 12/02/18 07:00 Dose:  0 mcg/kg/min, 0 mls/hr


NOREPINEPHRINE BIT/0.9 % NACL (Levophed 4 Mg/ 250 Ml Ns Premixed)  4 mg in 250 

mls @ 15 mls/hr IV .[id number] PRN; Protocol


   PRN Reason: TITRATE PER MD ORDER


   Last Admin: 12/02/18 16:29 Dose:  6 mcg/min, 22.5 mls/hr


Vasopressin 20 units/ Dextrose  101 mls @ 9.09 mls/hr IV .Q11H7M [facility]; Protocol


   Last Admin: 12/03/18 02:39 Dose:  9.09 mls/hr


Sodium Chloride (Sodium Chloride 0.45%)  1,000 mls @ 100 mls/hr IV .Q10H [facility]


   Last Admin: 12/03/18 10:49 Dose:  100 mls/hr


Dexmedetomidine HCl (Precedex 400mcg/100ml)  400 mcg in 100 mls @ 4.216 mls/hr 

IV .[id number] PRN; Protocol


   PRN Reason: Heart rate


   Last Titration: 12/02/18 05:19 Dose:  1 mcg/kg/hr, 21.081 mls/hr


Amiodarone HCl/Dextrose (Nexterone 360 Mg In D5w 200 Ml (Premix))  360 mg in 200

mls @ 16.667 mls/hr IV .Q12H [facility]; Protocol


   Last Admin: 12/03/18 01:18 Dose:  16.667 mls/hr


Meropenem/Sodium Chloride (Merrem Iv 500 Mg/Ns 50 Ml)  500 mg in 50 mls @ 100 

mls/hr IVPB 0600 [facility]; Protocol


Morphine Sulfate (Morphine Pca 1 Mg/Ml)  30 mls @ 4 mls/hr IV PRN PRN; Protocol


   PRN Reason: PCA PER MD ORDER


Insulin Human Regular (Humulin R Low)  0 units SC Q6H [facility]; Protocol


   Last Admin: 12/03/18 13:50 Dose:  Not Given


Levothyroxine Sodium (Synthroid)  125 mcg PO DAILY [facility]


   Last Admin: 11/28/18 09:38 Dose:  Not Given


Levothyroxine Sodium (Synthroid)  60 mcg IVP DAILY [facility]


   Last Admin: 12/03/18 13:33 Dose:  60 mcg


Lorazepam (Ativan)  0.5 mg IVP Q6H PRN; Protocol


   PRN Reason: Anxiety/Agitation


   Last Admin: 12/01/18 12:51 Dose:  0.5 mg


Nystatin (Nystop Topical Powder)  0 gm TOP Q8H [facility]


   Last Admin: 12/03/18 05:48 Dose:  1 applic


Pantoprazole Sodium (Protonix Inj)  40 mg IVP DAILY [facility]


   Last Admin: 12/03/18 09:16 Dose:  40 mg


Prednisolone Acetate (Pred Forte 1% Opht Susp)  0 ml OS DAILY [facility]


   Last Admin: 12/03/18 09:18 Dose:  1 drop


Risperidone (Risperdal Oral Soln)  0.5 mg PO HS [facility]; Protocol


   Last Admin: 12/02/18 22:18 Dose:  Not Given


Thiamine HCl (Vitamin B1 Tab)  100 mg PO BID [facility]


   Last Admin: 12/02/18 17:43 Dose:  Not Given


Ziprasidone (Geodon Inj)  10 mg IM Q12 PRN; Protocol


   PRN Reason: severe agitaiton/psychosis


   Last Admin: 11/30/18 21:54 Dose:  10 mg











- Labs


Labs: 


                                        





                                 12/03/18 05:30 





                                 12/03/18 05:30 





                                        











PT  14.2 SECONDS (9.4-12.5)  H  11/27/18  05:15    


 


INR  1.23   11/27/18  05:15    


 


APTT  32.3 Seconds (25.1-36.5)   11/26/18  18:56    














<Takhalov,Carter - Last Filed: 12/03/18 16:47>





Subjective





- Subjective


Subjective: 


Patients HCP/Wife Georgette Salazar and family members requesting that life 

support be withdrawn and patient made comfortable. 


Will respect wishes


Terminal extubation


DNR


Morphine drip


Primary MD, Dr Warner notified. 








Objective





- Vital Signs/Intake and Output


Vital Signs (last 24 hours): 


                                        











Temp Pulse Resp BP Pulse Ox


 


 103.1 F H  118 H  23   91/51 L  98 


 


 12/03/18 15:00  12/03/18 15:00  12/03/18 07:55  12/03/18 15:00  12/03/18 15:00








Intake and Output: 


                                        











 12/03/18 12/03/18





 06:59 18:59


 


Intake Total 1712 


 


Output Total 50 


 


Balance 1662 














- Medications


Medications: 


                               Current Medications





Acetaminophen (Tylenol 325mg Tab)  650 mg PO Q6H PRN


   PRN Reason: Fever >100.4 F


Acetaminophen (Tylenol 650 Mg Supp)  650 mg RC Q6H PRN


   PRN Reason: Fever >100.4 F


   Last Admin: 12/03/18 13:31 Dose:  650 mg


Albuterol/Ipratropium (Duoneb 3 Mg/0.5 Mg (3 Ml) Ud)  3 ml IH Q2H PRN


   PRN Reason: Shortness of Breath


   Last Admin: 12/01/18 23:46 Dose:  3 ml


Albuterol/Ipratropium (Duoneb 3 Mg/0.5 Mg (3 Ml) Ud)  3 ml IH [id number] [facility]


   Last Admin: 12/03/18 13:51 Dose:  3 ml


Artificial Tears (Artificial Tears Opht Oint)  1 gm OU Q6 [facility]


   Last Admin: 12/03/18 05:47 Dose:  1 applic


Aspirin (Ecotrin)  81 mg PO DAILY [facility]


   Last Admin: 12/03/18 09:18 Dose:  Not Given


Atorvastatin Calcium (Lipitor)  40 mg PO DAILY [facility]


   Last Admin: 12/03/18 09:26 Dose:  Not Given


Brimonidine Tartrate (Alphagan P 0.15% Opht)  1 drop OS Q8H [facility]


   Last Admin: 12/03/18 13:36 Dose:  1 drop


Cholecalciferol (Vitamin D)  2,000 intlu PO DAILY [facility]


   Last Admin: 12/02/18 14:05 Dose:  Not Given


Home Med (Home Med)  0 unit OS TID [facility]


   Last Admin: 12/03/18 13:37 Dose:  1 unit


Vancomycin HCl (Vancomycin 1gm)  1 gm in 250 mls @ 167 mls/hr IVPB Q12H [facility]; 

Protocol


   Last Admin: 12/03/18 02:05 Dose:  167 mls/hr


Dopamine HCl/Dextrose (Dopamine 400mg/250ml D5w)  400 mg in 250 mls @ 15.811 

mls/hr IV .[id number] PRN; Protocol


   PRN Reason: TITRATE PER MD ORDER


   Last Titration: 12/02/18 07:00 Dose:  0 mcg/kg/min, 0 mls/hr


NOREPINEPHRINE BIT/0.9 % NACL (Levophed 4 Mg/ 250 Ml Ns Premixed)  4 mg in 250 

mls @ 15 mls/hr IV .[id number] PRN; Protocol


   PRN Reason: TITRATE PER MD ORDER


   Last Admin: 12/02/18 16:29 Dose:  6 mcg/min, 22.5 mls/hr


Vasopressin 20 units/ Dextrose  101 mls @ 9.09 mls/hr IV .[id number] [facility]; Protocol


   Last Admin: 12/03/18 02:39 Dose:  9.09 mls/hr


Sodium Chloride (Sodium Chloride 0.45%)  1,000 mls @ 100 mls/hr IV .Q10H [facility]


   Last Admin: 12/03/18 10:49 Dose:  100 mls/hr


Dexmedetomidine HCl (Precedex 400mcg/100ml)  400 mcg in 100 mls @ 4.216 mls/hr 

IV .[id number] PRN; Protocol


   PRN Reason: Heart rate


   Last Titration: 12/02/18 05:19 Dose:  1 mcg/kg/hr, 21.081 mls/hr


Amiodarone HCl/Dextrose (Nexterone 360 Mg In D5w 200 Ml (Premix))  360 mg in 200

mls @ 16.667 mls/hr IV .Q12H [facility]; Protocol


   Last Admin: 12/03/18 01:18 Dose:  16.667 mls/hr


Meropenem/Sodium Chloride (Merrem Iv 500 Mg/Ns 50 Ml)  500 mg in 50 mls @ 100 

mls/hr IVPB 0600 [facility]; Protocol


Morphine Sulfate (Morphine Pca 1 Mg/Ml)  30 mls @ 1 mls/hr IV PRN PRN; Protocol


   PRN Reason: PCA PER MD ORDER


Insulin Human Regular (Humulin R Low)  0 units SC Q6H [facility]; Protocol


   Last Admin: 12/03/18 13:50 Dose:  Not Given


Levothyroxine Sodium (Synthroid)  125 mcg PO DAILY [facility]


   Last Admin: 11/28/18 09:38 Dose:  Not Given


Levothyroxine Sodium (Synthroid)  60 mcg IVP DAILY [facility]


   Last Admin: 12/03/18 13:33 Dose:  60 mcg


Lorazepam (Ativan)  0.5 mg IVP Q6H PRN; Protocol


   PRN Reason: Anxiety/Agitation


   Last Admin: 12/01/18 12:51 Dose:  0.5 mg


Nystatin (Nystop Topical Powder)  0 gm TOP Q8H [facility]


   Last Admin: 12/03/18 05:48 Dose:  1 applic


Pantoprazole Sodium (Protonix Inj)  40 mg IVP DAILY [facility]


   Last Admin: 12/03/18 09:16 Dose:  40 mg


Prednisolone Acetate (Pred Forte 1% Opht Susp)  0 ml OS DAILY [facility]


   Last Admin: 12/03/18 09:18 Dose:  1 drop


Risperidone (Risperdal Oral Soln)  0.5 mg PO HS [facility]; Protocol


   Last Admin: 12/02/18 22:18 Dose:  Not Given


Thiamine HCl (Vitamin B1 Tab)  100 mg PO BID [facility]


   Last Admin: 12/02/18 17:43 Dose:  Not Given


Ziprasidone (Geodon Inj)  10 mg IM Q12 PRN; Protocol


   PRN Reason: severe agitaiton/psychosis


   Last Admin: 11/30/18 21:54 Dose:  10 mg











- Labs


Labs: 


                                        





                                 12/03/18 05:30 





                                 12/03/18 05:30 





                                        











PT  14.2 SECONDS (9.4-12.5)  H  11/27/18  05:15    


 


INR  1.23   11/27/18  05:15    


 


APTT  32.3 Seconds (25.1-36.5)   11/26/18  18:56

## 2018-12-03 NOTE — CP.PCM.CON
History of Present Illness





- History of Present Illness


History of Present Illness: 





Palliative consult requested by Dr FLIP Tolentino


Reason: Goals of care





82 year old male who presented to ED  with AMS and agitation  on 11/26/18. 

Initial work up showed a 5.4  abdominal aortic aneurysm, hepatomegaly and 

atelectasis vs pneumonia in RLL> CT of the head was negative.  On 12/1/18  he 

developed respiratory distress which resolved. CT of chest showed no evidence of

PE, mild dilatation of thoracic aorta 4.1cm, extensive centrilobular and bullous

emphysematous changes L>R, small bilateral effusions with mild left atelectasis.

12/2/18 patient became unresponsive, PEA. Code Blue called> intubated>CPR ROSC. 

The patient was subsequently coded two more times during the night.





PMHx:HTN,HLD, CAD  COPD, hypothyroidism, small  bilateral subdural hematoma's 

4/18, right side hearing loss/blindness.


PSHx:: Bilateral cataract surgery, cardiac stent





Social History: Former heavy smoker, no alcohol, or drug use. , now li

ves with second wife, Massiel.





Family History: Non Contributory





Advance Care Planning The patient did not have an Advanced Directive: He is now 

DNR





Review of Systems: Intubated, unresponsive, unable to obtain





Past Patient History





- Infectious Disease


Hx of Infectious Diseases: None





- Tetanus Immunizations


Tetanus Immunization: Up to Date





- Past Social History


Smoking Status: Former Smoker


Alcohol: None


Drugs: Denies





- CARDIAC


Hx Hypertension: Yes





- PULMONARY


Hx Chronic Obstructive Pulmonary Disease (COPD): Yes





- NEUROLOGICAL


Hx Neurological Disorder: Yes





- HEENT


Hx HEENT Problems: Yes


Hx Blind: Yes (RIGHT EYE)


Hx Cataracts: Yes (LEFT EYE)


Hx Deafness: Yes (RIGHT EAR)





- RENAL


Hx Chronic Kidney Disease: No





- HEMATOLOGICAL/ONCOLOGICAL


Hx Blood Disorders: No





- INTEGUMENTARY


Hx Dermatological Problems: No





- MUSCULOSKELETAL/RHEUMATOLOGICAL


Hx Musculoskeletal Disorders: No


Hx Falls: Yes





- GASTROINTESTINAL


Hx Gastrointestinal Disorders: No





- GENITOURINARY/GYNECOLOGICAL


Hx Genitourinary Disorders: No





- PSYCHIATRIC


Hx Depression: No


Hx Emotional Abuse: No


Hx Physical Abuse: No


Hx Substance Use: No





- SURGICAL HISTORY


Hx Surgeries: Yes (CARDIAC STENT 2003, R EYE SX, AP, T&A)





- ANESTHESIA


Hx Anesthesia: Yes


Hx Anesthesia Reactions: No


Hx Malignant Hyperthermia: No





Meds


Allergies/Adverse Reactions: 


                                    Allergies











Allergy/AdvReac Type Severity Reaction Status Date / Time


 


No Known Allergies Allergy   Verified 04/28/18 15:33














- Medications


Medications: 


                               Current Medications





Acetaminophen (Tylenol 325mg Tab)  650 mg PO Q6H PRN


   PRN Reason: Fever >100.4 F


Acetaminophen (Tylenol 650 Mg Supp)  650 mg RC Q6H PRN


   PRN Reason: Fever >100.4 F


   Last Admin: 12/03/18 06:37 Dose:  650 mg


Albuterol/Ipratropium (Duoneb 3 Mg/0.5 Mg (3 Ml) Ud)  3 ml IH Q2H PRN


   PRN Reason: Shortness of Breath


   Last Admin: 12/01/18 23:46 Dose:  3 ml


Albuterol/Ipratropium (Duoneb 3 Mg/0.5 Mg (3 Ml) Ud)  3 ml IH H2QETPO Count includes the [facility]


   Last Admin: 12/03/18 07:55 Dose:  3 ml


Artificial Tears (Artificial Tears Opht Oint)  1 gm OU Q6 Count includes the [facility]


   Last Admin: 12/03/18 05:47 Dose:  1 applic


Aspirin (Ecotrin)  81 mg PO DAILY Count includes the [facility]


   Last Admin: 12/03/18 09:18 Dose:  Not Given


Atorvastatin Calcium (Lipitor)  40 mg PO DAILY Count includes the [facility]


   Last Admin: 12/03/18 09:26 Dose:  Not Given


Brimonidine Tartrate (Alphagan P 0.15% Opht)  1 drop OS Q8H Count includes the [facility]


   Last Admin: 12/03/18 05:47 Dose:  1 drop


Cholecalciferol (Vitamin D)  2,000 intlu PO DAILY Count includes the [facility]


   Last Admin: 12/02/18 14:05 Dose:  Not Given


Home Med (Home Med)  0 unit OS TID Count includes the [facility]


   Last Admin: 12/03/18 09:19 Dose:  1 unit


Vancomycin HCl (Vancomycin 1gm)  1 gm in 250 mls @ 167 mls/hr IVPB Q12H [facility]; 

Protocol


   Last Admin: 12/03/18 02:05 Dose:  167 mls/hr


Dopamine HCl/Dextrose (Dopamine 400mg/250ml D5w)  400 mg in 250 mls @ 15.811 

mls/hr IV .[id number] PRN; Protocol


   PRN Reason: TITRATE PER MD ORDER


   Last Titration: 12/02/18 07:00 Dose:  0 mcg/kg/min, 0 mls/hr


NOREPINEPHRINE BIT/0.9 % NACL (Levophed 4 Mg/ 250 Ml Ns Premixed)  4 mg in 250 

mls @ 15 mls/hr IV .[id number] PRN; Protocol


   PRN Reason: TITRATE PER MD ORDER


   Last Admin: 12/02/18 16:29 Dose:  6 mcg/min, 22.5 mls/hr


Vasopressin 20 units/ Dextrose  101 mls @ 9.09 mls/hr IV .Q11H7M [facility]; Protocol


   Last Admin: 12/03/18 02:39 Dose:  9.09 mls/hr


Sodium Chloride (Sodium Chloride 0.45%)  1,000 mls @ 100 mls/hr IV .Q10H [facility]


   Last Admin: 12/02/18 22:24 Dose:  100 mls/hr


Dexmedetomidine HCl (Precedex 400mcg/100ml)  400 mcg in 100 mls @ 4.216 mls/hr 

IV .[id number] PRN; Protocol


   PRN Reason: Heart rate


   Last Titration: 12/02/18 05:19 Dose:  1 mcg/kg/hr, 21.081 mls/hr


Amiodarone HCl/Dextrose (Nexterone 360 Mg In D5w 200 Ml (Premix))  360 mg in 200

mls @ 16.667 mls/hr IV .Q12H [facility]; Protocol


   Last Admin: 12/03/18 01:18 Dose:  16.667 mls/hr


Meropenem/Sodium Chloride (Merrem Iv 500 Mg/Ns 50 Ml)  500 mg in 50 mls @ 100 

mls/hr IVPB 0600 [facility]; Protocol


Insulin Human Regular (Humulin R Low)  0 units SC Q6H [facility]; Protocol


   Last Admin: 12/03/18 09:19 Dose:  Not Given


Levothyroxine Sodium (Synthroid)  125 mcg PO DAILY [facility]


   Last Admin: 11/28/18 09:38 Dose:  Not Given


Levothyroxine Sodium (Synthroid)  60 mcg IVP DAILY [facility]


   Last Admin: 12/02/18 14:39 Dose:  60 mcg


Lorazepam (Ativan)  0.5 mg IVP Q6H PRN; Protocol


   PRN Reason: Anxiety/Agitation


   Last Admin: 12/01/18 12:51 Dose:  0.5 mg


Nystatin (Nystop Topical Powder)  0 gm TOP Q8H [facility]


   Last Admin: 12/03/18 05:48 Dose:  1 applic


Pantoprazole Sodium (Protonix Inj)  40 mg IVP DAILY Count includes the [facility]


   Last Admin: 12/03/18 09:16 Dose:  40 mg


Prednisolone Acetate (Pred Forte 1% Opht Susp)  0 ml OS DAILY Count includes the [facility]


   Last Admin: 12/03/18 09:18 Dose:  1 drop


Risperidone (Risperdal Oral Soln)  0.5 mg PO HS Count includes the [facility]; Protocol


   Last Admin: 12/02/18 22:18 Dose:  Not Given


Thiamine HCl (Vitamin B1 Tab)  100 mg PO BID Count includes the [facility]


   Last Admin: 12/02/18 17:43 Dose:  Not Given


Ziprasidone (Geodon Inj)  10 mg IM Q12 PRN; Protocol


   PRN Reason: severe agitaiton/psychosis


   Last Admin: 11/30/18 21:54 Dose:  10 mg











Physical Exam





- Constitutional


Appears: Chronically Ill





- Head Exam


Head Exam: NORMOCEPHALIC





- Eye Exam


Pupil Exam: Fixed





- ENT Exam


ENT Exam: Mucous Membranes Moist





- Neck Exam


Neck exam: Positive for: Normal Inspection





- Respiratory Exam


Respiratory Exam: Decreased Breath Sounds


Additional comments: 





intubated





- Cardiovascular Exam


Cardiovascular Exam: Tachycardia, +S1, +S2





- GI/Abdominal Exam


GI & Abdominal Exam: Hypoactive Bowel Sounds, Soft





- Extremities Exam


Additional comments: 





both feet mottled, cool to touch





- Neurological Exam


Additional comments: 





comatose





- Skin


Skin Exam: Dry, Pallor





- Additional Findings


Additional findings: 





Palliative performance scale rating 10%





Results





- Vital Signs


Recent Vital Signs: 


                                Last Vital Signs











Temp  103.3 F H  12/03/18 07:37


 


Pulse  105 H  12/03/18 06:00


 


Resp  23   12/03/18 07:55


 


BP  134/61   12/03/18 06:00


 


Pulse Ox  96   12/03/18 07:55














- Labs


Result Diagrams: 


                                 12/03/18 05:30





                                 12/03/18 05:30


Labs: 


                         Laboratory Results - last 24 hr











  12/02/18 12/02/18 12/02/18





  01:00 11:52 16:08


 


WBC   


 


RBC   


 


Hgb   


 


Hct   


 


MCV   


 


MCH   


 


MCHC   


 


RDW   


 


Plt Count   


 


MPV   


 


pCO2   


 


pO2   


 


HCO3   


 


ABG pH   


 


ABG Total CO2   


 


ABG O2 Saturation   


 


ABG O2 Content   


 


ABG Base Excess   


 


ABG Hemoglobin   


 


ABG Carboxyhemoglobin   


 


POC ABG HHb (Measured)   


 


ABG Methemoglobin   


 


ABG O2 Capacity   


 


Hgb O2 Saturation   


 


FiO2   


 


Sodium   


 


Potassium   


 


Chloride   


 


Carbon Dioxide   


 


Anion Gap   


 


BUN   


 


Creatinine   


 


Est GFR ( Amer)   


 


Est GFR (Non-Af Amer)   


 


POC Glucose (mg/dL)   200 H  266 H


 


Random Glucose   


 


Calcium   


 


Phosphorus   


 


Magnesium   


 


Total Bilirubin   


 


AST   


 


ALT   


 


Alkaline Phosphatase   


 


Total Protein   


 


Albumin   


 


Globulin   


 


Albumin/Globulin Ratio   


 


Procalcitonin  0.09 L  














  12/02/18 12/03/18 12/03/18





  20:23 00:04 05:30


 


WBC    24.3 H


 


RBC    3.76


 


Hgb    11.5 L


 


Hct    37.1 L


 


MCV    98.7  D


 


MCH    30.6


 


MCHC    31.0


 


RDW    14.7 H


 


Plt Count    146


 


MPV    10.7


 


pCO2   


 


pO2   


 


HCO3   


 


ABG pH   


 


ABG Total CO2   


 


ABG O2 Saturation   


 


ABG O2 Content   


 


ABG Base Excess   


 


ABG Hemoglobin   


 


ABG Carboxyhemoglobin   


 


POC ABG HHb (Measured)   


 


ABG Methemoglobin   


 


ABG O2 Capacity   


 


Hgb O2 Saturation   


 


FiO2   


 


Sodium   


 


Potassium   


 


Chloride   


 


Carbon Dioxide   


 


Anion Gap   


 


BUN   


 


Creatinine   


 


Est GFR ( Amer)   


 


Est GFR (Non-Af Amer)   


 


POC Glucose (mg/dL)  172 H  144 H 


 


Random Glucose   


 


Calcium   


 


Phosphorus   


 


Magnesium   


 


Total Bilirubin   


 


AST   


 


ALT   


 


Alkaline Phosphatase   


 


Total Protein   


 


Albumin   


 


Globulin   


 


Albumin/Globulin Ratio   


 


Procalcitonin   














  12/03/18 12/03/18





  05:30 05:40


 


WBC  


 


RBC  


 


Hgb  


 


Hct  


 


MCV  


 


MCH  


 


MCHC  


 


RDW  


 


Plt Count  


 


MPV  


 


pCO2   27 L


 


pO2   121.0 H


 


HCO3   17.9 L


 


ABG pH   7.43


 


ABG Total CO2   18.7 L


 


ABG O2 Saturation   100.4 H


 


ABG O2 Content   12.0 L


 


ABG Base Excess   -5.5 L


 


ABG Hemoglobin   8.6 L


 


ABG Carboxyhemoglobin   2.6 H


 


POC ABG HHb (Measured)   -0.4 L


 


ABG Methemoglobin   0.7


 


ABG O2 Capacity   12.0 L


 


Hgb O2 Saturation   97.1


 


FiO2   30.0


 


Sodium  151 H 


 


Potassium  4.4 


 


Chloride  120 H 


 


Carbon Dioxide  23 


 


Anion Gap  12 


 


BUN  46 H 


 


Creatinine  5.2 H 


 


Est GFR ( Amer)  13 


 


Est GFR (Non-Af Amer)  11 


 


POC Glucose (mg/dL)  


 


Random Glucose  136 H 


 


Calcium  7.6 L 


 


Phosphorus  4.6 H 


 


Magnesium  2.0 


 


Total Bilirubin  0.9 


 


AST  302 H D 


 


ALT  393 H 


 


Alkaline Phosphatase  62 


 


Total Protein  5.0 L 


 


Albumin  2.5 L 


 


Globulin  2.5 


 


Albumin/Globulin Ratio  1.0 L 


 


Procalcitonin  














Assessment & Plan





- Assessment and Plan (Free Text)


Assessment: 





82 year old with history of COPD, CAD, HTN, AMS, subdural hematoma's who is 

admitted wit then s/p cardiopulmonary arrest, hepato/renal failure, 

sepsis,hypernatremia.





The patient is unresponsive to painful stimuli. Pupils are fixed. No gag reflex.

Lower extremities mottled, cool to touch.He is off sedation. On vasopressors. 





Dr. IRVING Christensen and I met with patient's wife> Gaols of care discussed. Mrs Salazar understands her husbands medical situation and poor prognosis. She 

understands that he has sustained anoxic brain injury and that his situation is 

lilkey irreversible. She espzreeesed that when she held his hand that she felt 

that he is already gone. Option for withdrawal of life prolonging interventions 

and transition to comfort care offered. 


Process of terminal extuabtion and withdrawal of life prolonging treatment 

explained, questions answered.Wife states that her  would not want his li

fe prolonged under these circumstances. She intends to contact the patient's son

and indicated that she will likely proceed with terminal extubation.





Time spent in goals of care and end of life counseling with family, 45 minutes


Plan: 





Goals of care and advance care planning





End of life counseling

## 2018-12-03 NOTE — RAD
Date of service: 



12/03/2018



HISTORY:

 f/u; intubated 



COMPARISON:

12/02/2018 



FINDINGS:



LUNGS:

No active pulmonary disease.



PLEURA:

No significant pleural effusion identified, no pneumothorax apparent.



CARDIOVASCULAR:

No aortic atherosclerotic calcification present.



Normal cardiac size. No pulmonary vascular congestion. 



OSSEOUS STRUCTURES:

No significant abnormalities.



VISUALIZED UPPER ABDOMEN:

Normal.



OTHER FINDINGS:

Endotracheal tube in satisfactory position



IMPRESSION:

No active disease.

## 2018-12-03 NOTE — RAD
Date of service: 



12/02/2018



HISTORY:

 f/u; intubated 



COMPARISON:

12/02/2018 



FINDINGS:



LUNGS:

No active pulmonary disease.  Endotracheal tube in satisfactory 

position 



PLEURA:

No significant pleural effusion identified, no pneumothorax apparent.



CARDIOVASCULAR:

Aortic calcification



Normal cardiac size. No pulmonary vascular congestion. 



OSSEOUS STRUCTURES:

No significant abnormalities.



VISUALIZED UPPER ABDOMEN:

Normal.



OTHER FINDINGS:

None.



IMPRESSION:

No active disease.

## 2018-12-03 NOTE — CP.PCM.PN
Subjective





- Date & Time of Evaluation


Date of Evaluation: 12/03/18


Time of Evaluation: 16:00





- Subjective


Subjective: 


Infectious Disease Follow Up:


December 3, 2018





81 yo [facility] with several days of AMS and worsening agitation.  

Unable to obtain further information from the patient as he is currently sedated

with Ativan.  History and events obtained from family, staff, and hospital 

chart.  The PMHx includes HTN, COPD, hx of small bilateral subdural hematoma 

secondary to trauma from fall without neurosurgical intervention(4/2018), 

hypothyroidism, HLD, CAD s/p cardiac stent 2003.  As per wife, the patient was 

having odd behavior 6 days prior to admission with disorganized and incoherent 

speech.  The patient has become more agitated and restless each day.  He was 

found to be tachycardic with low grade fevers up to 100.5 F.





He remains confused and is currently in soft restraints.  The patient's wife is 

in the room.  No other sick contacts.  [facility].  He received steroid eye drops and ophthalmic antibiotics 

since that time.  As per the wife, the patient has been able to recognize her 

once in while during her visit.  He is otherwise confused and restless.





On Zosyn and Vancomycin IV.





Noted events starting from two nights ago that led to Multiple Code Blue events.

 The patient was intubated and ventilated.  Desaturations prior to Code Blue ev

ent.  Patient's mentation appears the same.  The patient had two more Code Blue 

events overnight.





Unresponsive.  Anoxic brain injury.  WBC at 24.3.





Family deciding to withdraw care today.





Objective





- Vital Signs/Intake and Output


Vital Signs (last 24 hours): 


                                        











Temp Pulse Resp BP Pulse Ox


 


 103.1 F H  118 H  23   91/51 L  98 


 


 12/03/18 15:00  12/03/18 15:00  12/03/18 07:55  12/03/18 15:00  12/03/18 15:00








Intake and Output: 


                                        











 12/03/18 12/03/18





 06:59 18:59


 


Intake Total 1712 


 


Output Total 50 


 


Balance 1662 














- Medications


Medications: 


                               Current Medications





Acetaminophen (Tylenol 325mg Tab)  650 mg PO Q6H PRN


   PRN Reason: Fever >100.4 F


Acetaminophen (Tylenol 650 Mg Supp)  650 mg RC Q6H PRN


   PRN Reason: Fever >100.4 F


   Last Admin: 12/03/18 13:31 Dose:  650 mg


Albuterol/Ipratropium (Duoneb 3 Mg/0.5 Mg (3 Ml) Ud)  3 ml IH Q2H PRN


   PRN Reason: Shortness of Breath


   Last Admin: 12/01/18 23:46 Dose:  3 ml


Albuterol/Ipratropium (Duoneb 3 Mg/0.5 Mg (3 Ml) Ud)  3 ml IH [id number] [facility]


   Last Admin: 12/03/18 13:51 Dose:  3 ml


Artificial Tears (Artificial Tears Opht Oint)  1 gm OU Q6 [facility]


   Last Admin: 12/03/18 05:47 Dose:  1 applic


Aspirin (Ecotrin)  81 mg PO DAILY [facility]


   Last Admin: 12/03/18 09:18 Dose:  Not Given


Atorvastatin Calcium (Lipitor)  40 mg PO DAILY [facility]


   Last Admin: 12/03/18 09:26 Dose:  Not Given


Brimonidine Tartrate (Alphagan P 0.15% Opht)  1 drop OS Q8H [facility]


   Last Admin: 12/03/18 13:36 Dose:  1 drop


Cholecalciferol (Vitamin D)  2,000 intlu PO DAILY [facility]


   Last Admin: 12/02/18 14:05 Dose:  Not Given


Home Med (Home Med)  0 unit OS TID [facility]


   Last Admin: 12/03/18 13:37 Dose:  1 unit


Vancomycin HCl (Vancomycin 1gm)  1 gm in 250 mls @ 167 mls/hr IVPB Q12H [facility]; 

Protocol


   Last Admin: 12/03/18 02:05 Dose:  167 mls/hr


Dopamine HCl/Dextrose (Dopamine 400mg/250ml D5w)  400 mg in 250 mls @ 15.811 

mls/hr IV .[id number] PRN; Protocol


   PRN Reason: TITRATE PER MD ORDER


   Last Titration: 12/02/18 07:00 Dose:  0 mcg/kg/min, 0 mls/hr


NOREPINEPHRINE BIT/0.9 % NACL (Levophed 4 Mg/ 250 Ml Ns Premixed)  4 mg in 250 

mls @ 15 mls/hr IV .[id number] PRN; Protocol


   PRN Reason: TITRATE PER MD ORDER


   Last Admin: 12/02/18 16:29 Dose:  6 mcg/min, 22.5 mls/hr


Vasopressin 20 units/ Dextrose  101 mls @ 9.09 mls/hr IV .Q11H7M [facility]; Protocol


   Last Admin: 12/03/18 02:39 Dose:  9.09 mls/hr


Sodium Chloride (Sodium Chloride 0.45%)  1,000 mls @ 100 mls/hr IV .Q10H [facility]


   Last Admin: 12/03/18 10:49 Dose:  100 mls/hr


Dexmedetomidine HCl (Precedex 400mcg/100ml)  400 mcg in 100 mls @ 4.216 mls/hr 

IV .B58H24D PRN; Protocol


   PRN Reason: Heart rate


   Last Titration: 12/02/18 05:19 Dose:  1 mcg/kg/hr, 21.081 mls/hr


Amiodarone HCl/Dextrose (Nexterone 360 Mg In D5w 200 Ml (Premix))  360 mg in 200

mls @ 16.667 mls/hr IV .Q12H [facility]; Protocol


   Last Admin: 12/03/18 01:18 Dose:  16.667 mls/hr


Meropenem/Sodium Chloride (Merrem Iv 500 Mg/Ns 50 Ml)  500 mg in 50 mls @ 100 

mls/hr IVPB 0600 [facility]; Protocol


Morphine Sulfate (Morphine Pca 1 Mg/Ml)  30 mls @ 1 mls/hr IV PRN PRN; Protocol


   PRN Reason: PCA PER MD ORDER


   Last Admin: 12/03/18 16:35 Dose:  1 mg/hr, 1 mls/hr


Insulin Human Regular (Humulin R Low)  0 units SC Q6H [facility]; Protocol


   Last Admin: 12/03/18 13:50 Dose:  Not Given


Levothyroxine Sodium (Synthroid)  125 mcg PO DAILY [facility]


   Last Admin: 11/28/18 09:38 Dose:  Not Given


Levothyroxine Sodium (Synthroid)  60 mcg IVP DAILY [facility]


   Last Admin: 12/03/18 13:33 Dose:  60 mcg


Lorazepam (Ativan)  0.5 mg IVP Q6H PRN; Protocol


   PRN Reason: Anxiety/Agitation


   Last Admin: 12/01/18 12:51 Dose:  0.5 mg


Nystatin (Nystop Topical Powder)  0 gm TOP Q8H [facility]


   Last Admin: 12/03/18 05:48 Dose:  1 applic


Pantoprazole Sodium (Protonix Inj)  40 mg IVP DAILY [facility]


   Last Admin: 12/03/18 09:16 Dose:  40 mg


Prednisolone Acetate (Pred Forte 1% Opht Susp)  0 ml OS DAILY [facility]


   Last Admin: 12/03/18 09:18 Dose:  1 drop


Risperidone (Risperdal Oral Soln)  0.5 mg PO HS [facility]; Protocol


   Last Admin: 12/02/18 22:18 Dose:  Not Given


Thiamine HCl (Vitamin B1 Tab)  100 mg PO BID [facility]


   Last Admin: 12/02/18 17:43 Dose:  Not Given


Ziprasidone (Geodon Inj)  10 mg IM Q12 PRN; Protocol


   PRN Reason: severe agitaiton/psychosis


   Last Admin: 11/30/18 21:54 Dose:  10 mg











- Labs


Labs: 


                                        





                                 12/03/18 05:30 





                                 12/03/18 05:30 





                                        











PT  14.2 SECONDS (9.4-12.5)  H  11/27/18  05:15    


 


INR  1.23   11/27/18  05:15    


 


APTT  32.3 Seconds (25.1-36.5)   11/26/18  18:56    














- Constitutional


Appears: Chronically Ill, Other (Unresponsive)





- Head Exam


Head Exam: ATRAUMATIC, NORMOCEPHALIC





- Eye Exam


Eye Exam: EOMI, PERRL


Pupil Exam: NORMAL ACCOMODATION, PERRL





- ENT Exam


ENT Exam: Mucous Membranes Moist, Normal External Ear Exam, TM's Normal 

Bilaterally





- Neck Exam


Neck Exam: Full ROM, Normal Inspection





- Respiratory Exam


Respiratory Exam: absent: Decreased Breath Sounds, Rhonchi, Wheezes


Additional comments: 





Intubated and ventilated.





- Cardiovascular Exam


Cardiovascular Exam: REGULAR RHYTHM, RRR, +S1, +S2





- GI/Abdominal Exam


GI & Abdominal Exam: Soft, Normal Bowel Sounds.  absent: Distended, Tenderness





- Extremities Exam


Extremities Exam: Full ROM, Normal Inspection





- Neurological Exam


Additional comments: 





Intubated and ventilated.  Poorly responsive.





Assessment and Plan





- Assessment and Plan (Free Text)


Assessment: 


81 yo  male brought in for AMS and Confusion.  CT of Abdomen showing I

nfrarenal Aortic Hernia and right lung base consolidation suggestive of a 

pneumonia.  On Rocephin and Vancomycin IV.  CT head did not show any bleeding in

the brain.  Procalcitonin sent.  Supportive care.  Pan cultures sent.  Mild 

leukocytosis.





Awaiting ammonia and tox screen.  Procalcitonin <0.05.  This argues against a 

septic or pneumonic process.  Ammonia < 9.





Patient with Hypothyroidism, CAD, COPD, and severe hearing impairment.





Still with Leukocytosis of 24.3 today.  Cultures remain negative.





Events of the last 48 hours noted.  Code Blue events.  On Meropenem and IV 

Vancomycin.  Noted that there were 2 more Code Blue events overnight.





Dismal prognosis.  Anoxic Brain injury.  Family deciding on withdrawal of care.





Thank you for allowing me to participate in the care of the patient, we will 

follow with you.

## 2019-07-15 NOTE — PCM.RRT
<NicFredo olvera - Last Filed: 12/01/18 20:04>





RRT Nurse Assessment





- Situation


Date: 12/01/18


Time RRT was called: 08:03


RRT Responder Arrival Time: 08:03


RRT Location:: [address] [place] [address]


RRT Reason for Call: Respiratory Distress


RRT Called By: RN





- IV


IV Inserted during RRT?: No





- Respiratory


Oxygen Delivery Method: Nasal Cannula @L/min


Oxygen Flow Rate: 3


Received Nebulizer Treatments:: No


Was the Patient Ventilated with Bag/Mask 100% O2?: No


Secretions Suctioned?: No


Was the Patient Intubated?: No


Was the Patient Placed on a Ventilator?: No





- Diagnostic Test Ordered


EKG: No


Chest X-Ray: Yes


CT Scan: No





- Stat Labs Ordered


RRT Stat Labs Ordered: ABG


CPR started during RRT?: No





- Vital Signs


Vital Sign: 


Rapid Response Vital Sign





Blood Pressure                   162/101


Pulse Rate                       116


Respiratory Rate                 40


Temperature                      97.3 F











- Finger Stick Blood Glucose


Finger Stick Blood Glucose: 112





- Vital Signs at end of RRT


Vital Signs at end of RRT: 


Rapid Response End Vital Sign





Blood Pressure                   148/88


Pulse Rate                       115


Respiratory Rate                 38


Temperature                      97.3 F











- Recommendations


Notifications: Attending Physician





I.Reason for RRT





- A) Acute Change in Patient:


(Select all that apply): Staff member or family is worried about patient


Subjective: 





Rapid Response called at 0801, responded immediately.  As per nursing, called 

due to patient tachypnic to 40's.  Explained to nursing that this is an 

intermittent issue for this patient, usually occurring prior to an episode of 

agitation.  As per nursing, satting at 88% on 3L, improved to 94% with breathing

tx.  At time of rapid, patient hemodynamically stable, satting well on 3L.  CXR 

and ABG obtained, unremarkable.  No accessory muscle use noted.  Patient remains

at baseline altered state.  Rapid response ended.





- Neurological Status


Other (Please specify): at baseline altered status (baseline since admission)





- Respiratory


Oxygen Delivery Method: Nasal Cannula @L/min


Oxygen Flow Rate: 3





- Extremities Exam


Additional comments: 





- Constitutional


Appears: Non-toxic, Confused (incoherent speech, confused, not following 

commands), Chronically Ill





- Head Exam


Head Exam: ATRAUMATIC, NORMAL INSPECTION, NORMOCEPHALIC





- Eye Exam


Eye Exam: Normal appearance (Left eye normal appearance).  absent: Conjunctival 

injection, Scleral icterus


Pupil Exam: absent: Irregular, Unequal


Absent right eye, no discharge or swelling around R eye socket





- ENT Exam


ENT Exam: Mucous Membranes Moist





- Neck Exam


Neck exam: Positive for: Full Rom (passively, not following commands)





- Respiratory Exam


Respiratory Exam: Clear to Auscultation Bilateral, Tachypnic but breathing 

clearly, breath sounds in all fields, not gasping or cyanotic, satting 96% on 

3L.  absent: Accessory Muscle Use, Decreased Breath Sounds, Rales, Rhonchi, 

Wheezes





- Cardiovascular Exam


Cardiovascular Exam: Tachycardia (tachy to 100's), REGULAR RHYTHM, +S1, +S2.  

absent: Bradycardia, Irregular Rhythm, JVD, +S4





- GI/Abdominal Exam


GI & Abdominal Exam: Normal Bowel Sounds, Soft.  absent: Diminished Bowel 

Sounds, Distended, Firm, Hyperactive Bowel Sounds, Hypoactive Bowel Sounds, 

Rigid





- Extremities Exam


Extremities exam: Positive for: normal capillary refill, pedal pulses present.  

Negative for: pedal edema





- Neurological Exam


altered, not following commands, attempting to speak but frequently incoherent 

or illogical, intermittently moving extremities spontaneously and trying to 

slide out of bed





- Psychiatric Exam


unable to assess, altered and incoherent, intermittently agitated





- Skin


Skin Exam: Dry, Intact, Normal Color, Warm





Plan





- Assessment of Findings&Treatment Plan





ABG and CXR obtained, unremarkable.


Patient known to me during this admission, this is a common occurrence preceding

an episode of agitation.


Hemodynamically stable, satting well, no further acute intervention indicated.  

Rapid response terminated.





<Sebastian Tolentino S - Last Filed: 12/02/18 19:25>





RRT Nurse Assessment





- Vital Signs


Vital Sign: 


Rapid Response Vital Sign





Blood Pressure                   147/127


Pulse Rate                       52


Respiratory Rate                 18


Temperature                      98.7 F


Oxygen Saturation                95











- Vital Signs at end of RRT


Vital Signs at end of RRT: 


Rapid Response End Vital Sign





Blood Pressure                   140/95


Pulse Rate                       177


Respiratory Rate                 18


Temperature                      98.7 F


O2 Sat by Pulse Oximetry         95











Plan





- Assessment of Findings&Treatment Plan





Spoke to resident. This is a late entry. 





Pt seen and examined this am.  I have reviewed the note of the medical resident 

and agree with it. I have reviewed the meds and labs of the pt. I have discussed

the assessment and plan with the resident. Dr Warner will be seeing the pt for 

the weekend. Left messages and mailing letter for patient's mother to call back and schedule his Gastroenterology appointment. Thanks.

## 2019-12-16 NOTE — CP.PCM.PRO
Pronouncement of Death Note





- Clinical Findings


Physical Exam: No Response Verbal/Painful Stimuli, Absent Peripheral Puls

es{Carotid & Femoral}, Absent Heart & Breath Sounds, No Pupillary Light Reflex, 

No Corneal Reflex, Pupils Fixed & Dilated, Absence of Vital Signs





- Pronouncement Time


Time of Pronouncement of Death: 16:50





- Notifications


Pronouncement Notifications: Family Notified, Atending Notified


Medical Examiner Notified: No





- Autopsy


Autopsy Requested: No





- [place]


[place]: [id number]
chest pain/epigastric pain